# Patient Record
Sex: MALE | Race: WHITE | Employment: OTHER | URBAN - METROPOLITAN AREA
[De-identification: names, ages, dates, MRNs, and addresses within clinical notes are randomized per-mention and may not be internally consistent; named-entity substitution may affect disease eponyms.]

---

## 2017-10-03 PROBLEM — M19.90 ARTHRITIS: Status: ACTIVE | Noted: 2017-10-03

## 2017-10-03 PROBLEM — E78.5 HYPERLIPIDEMIA: Status: ACTIVE | Noted: 2017-10-03

## 2017-10-03 PROBLEM — I50.43 ACUTE ON CHRONIC COMBINED SYSTOLIC AND DIASTOLIC CONGESTIVE HEART FAILURE (HCC): Status: ACTIVE | Noted: 2017-09-23

## 2017-10-03 PROBLEM — I63.9 ISCHEMIC STROKE (HCC): Status: ACTIVE | Noted: 2017-09-24

## 2017-10-03 PROBLEM — J44.1 CHRONIC OBSTRUCTIVE PULMONARY DISEASE WITH ACUTE EXACERBATION (HCC): Status: ACTIVE | Noted: 2017-10-03

## 2017-10-03 PROBLEM — I10 HYPERTENSION: Status: ACTIVE | Noted: 2017-10-03

## 2017-10-03 PROBLEM — E11.9 DIABETES MELLITUS (HCC): Status: ACTIVE | Noted: 2017-10-03

## 2018-01-03 ENCOUNTER — HOSPITAL ENCOUNTER (OUTPATIENT)
Dept: SPEECH THERAPY | Age: 73
Discharge: HOME | End: 2018-01-03
Attending: NURSE PRACTITIONER | Admitting: NURSE PRACTITIONER

## 2018-01-03 DIAGNOSIS — R13.10 DYSPHAGIA, UNSPECIFIED TYPE: ICD-10-CM

## 2018-01-03 NOTE — COMMUNICATION BODY
INSTRUMENTAL SWALLOW REPORT  MODIFIED BARIUM SWALLOW     NAME: John Wiley                                   : 1945  MRN: [id number]                                  Date of Eval: 1/3/2018                                     Ordering Physician: Dr. Prince Dodson  Radiologist: Dr. Gloria Garcia     Referring Diagnosis(es): Referring Diagnosis: Dysphagia     Past Medical History:  has a past medical history of Arthritis; CAD (coronary artery disease); Diabetes mellitus ([address] Hyperlipidemia; and Hypertension. Past Surgical History:  has a past surgical history that includes Coronary angioplasty with stent and femoral bypass (Bilateral).     Current Diet Solid Consistency: Regular  Current Diet Liquid Consistency: Thin     Date of Prior Study: 17  Type of Study: Repeat MBS  Results of Prior Study: Recommended Puree diet with NTL     Recent CXR/CT of Chest: n/a     Patient Complaints/Reason for Referral:  John Wiley was referred for a MBS to assess the efficiency of his/her swallow function, assess for aspiration, and to make recommendations regarding safe dietary consistencies, effective compensatory strategies, and safe eating environment. Patient complaints: Pt reports occasional dysphagia      Pain   Patient Currently in Pain: No     General Comment  Comments: Pt had CVA in 2017; He was seen at [address] [place] [address] for dysphagia therapy during 2017 and was tolerating a Dysphagia II (mechanical soft) diet with thin liquids/no straws without difficulty.     Recommended Diet:  Solid consistency: Regular  Liquid consistency: Nectar (No straws)  Liquid administration via: Spoon, Cup  Medication administration: Meds in puree     Impressions:  Treatment Dx and ICD 10: Dysphagia, CVA  Pt demonstrates mild-moderate pharyngeal dysphagia  · Pt's oral phase appears grossly WFL. Pt did demonstrated reduced bolus control with all liquid trials, resulting in premature bolus loss to pharynx.   · Pt's pharyngeal cervical level throughout the duration of the study     Patient Position: Lateral and Patient Degrees: Pt seated upright in chair  Consistencies Administered: Reg solid, Puree, Thin cup, Thin teaspoon, Thin straw, Nectar cup     Dysphagia Outcome Severity Scale: Level 4: Mild moderate dysphagia- Intermittent supervision/cueing. One - two diet consistencies restricted  Penetration-Aspiration Scale (PAS): 6 - Material enters the airway, passes below the vocal folds, and is ejected into the larynx or out of the airway     Behavior/Cognition/Vision/Hearing:  Behavior/Cognition: Alert, Cooperative, Pleasant mood  Vision: Within Functional Limits  Hearing: Within functional limits     Safe Swallow Protocol:  Supervision: Distant  Compensatory Swallowing Strategies: Alternate solids and liquids, Upright as possible for all oral intake, Remain upright for 30-45 minutes after meals, No straws, Eat/Feed slowly, Small bites/sips     Recommendations/Treatment  Requires SLP Intervention: Yes  Recommendations: F/U MBS  D/C Recommendations: Home ST, Outpatient     Recommended Exercises:    Therapeutic Interventions: Diet tolerance monitoring, Patient/Family education, Laryngeal exercises, Pharyngeal exercises, Tongue base strengthening     Education: Images and recommendations were reviewed with pt following this exam.   Patient Education: Pt educated on MBSS procedure, nature of oropharyngeal deficits, assessment results and recommendations. Patient Education Response: Verbalizes understanding, Needs reinforcement     Prognosis  Prognosis for safe diet advancement: good  Safety Devices  Safety Devices in place: Yes  Type of devices:  (Pt left MBSS in no distress; left independently)     Goals: To be determined by treating SLP       G-Code:  SLP G-Codes  Functional Limitations: Swallowing  Swallow Current Status (): At least 20 percent but less than 40 percent impaired, limited or restricted  Swallow Goal Status ():  At

## 2018-01-03 NOTE — PROCEDURES
seated upright in chair  Consistencies Administered: Reg solid, Puree, Thin cup, Thin teaspoon, Thin straw, Nectar cup    Dysphagia Outcome Severity Scale: Level 4: Mild moderate dysphagia- Intermittent supervision/cueing. One - two diet consistencies restricted  Penetration-Aspiration Scale (PAS): 6 - Material enters the airway, passes below the vocal folds, and is ejected into the larynx or out of the airway    Behavior/Cognition/Vision/Hearing:  Behavior/Cognition: Alert, Cooperative, Pleasant mood  Vision: Within Functional Limits  Hearing: Within functional limits    Safe Swallow Protocol:  Supervision: Distant  Compensatory Swallowing Strategies: Alternate solids and liquids, Upright as possible for all oral intake, Remain upright for 30-45 minutes after meals, No straws, Eat/Feed slowly, Small bites/sips    Recommendations/Treatment  Requires SLP Intervention: Yes  Recommendations: F/U MBS  D/C Recommendations: Home ST, Outpatient     Recommended Exercises:    Therapeutic Interventions: Diet tolerance monitoring, Patient/Family education, Laryngeal exercises, Pharyngeal exercises, Tongue base strengthening    Education: Images and recommendations were reviewed with pt following this exam.   Patient Education: Pt educated on MBSS procedure, nature of oropharyngeal deficits, assessment results and recommendations. Patient Education Response: Verbalizes understanding, Needs reinforcement    Prognosis  Prognosis for safe diet advancement: good  Safety Devices  Safety Devices in place: Yes  Type of devices:  (Pt left MBSS in no distress; left independently)    Goals: To be determined by treating SLP      G-Code:  SLP G-Codes  Functional Limitations: Swallowing  Swallow Current Status (): At least 20 percent but less than 40 percent impaired, limited or restricted  Swallow Goal Status (): At least 20 percent but less than 40 percent impaired, limited or restricted  Swallow Discharge Status ():  At

## 2018-11-15 ENCOUNTER — HOSPITAL ENCOUNTER (OUTPATIENT)
Dept: GENERAL RADIOLOGY | Age: 73
Discharge: HOME | End: 2018-11-15
Payer: COMMERCIAL

## 2018-11-15 ENCOUNTER — HOSPITAL ENCOUNTER (OUTPATIENT)
Age: 73
Discharge: HOME | End: 2018-11-15
Payer: COMMERCIAL

## 2018-11-15 DIAGNOSIS — J44.9 OBSTRUCTIVE CHRONIC BRONCHITIS WITHOUT EXACERBATION (HCC): ICD-10-CM

## 2018-11-15 PROCEDURE — 71046 X-RAY EXAM CHEST 2 VIEWS: CPT

## 2020-09-09 ENCOUNTER — HOSPITAL ENCOUNTER (EMERGENCY)
Age: 75
Discharge: ANOTHER HOSPITAL | End: 2020-09-09
Attending: EMERGENCY MEDICINE
Payer: MEDICARE

## 2020-09-09 ENCOUNTER — APPOINTMENT (OUTPATIENT)
Dept: CT IMAGING | Age: 75
End: 2020-09-09
Payer: MEDICARE

## 2020-09-09 VITALS
DIASTOLIC BLOOD PRESSURE: 73 MMHG | TEMPERATURE: 98.5 F | OXYGEN SATURATION: 100 % | RESPIRATION RATE: 18 BRPM | HEIGHT: 72 IN | BODY MASS INDEX: 29.12 KG/M2 | HEART RATE: 67 BPM | SYSTOLIC BLOOD PRESSURE: 158 MMHG | WEIGHT: 215 LBS

## 2020-09-09 LAB
A/G RATIO: 1.3 (ref 1.1–2.2)
ALBUMIN SERPL-MCNC: 3.4 G/DL (ref 3.4–5)
ALP BLD-CCNC: 159 U/L (ref 40–129)
ALT SERPL-CCNC: 8 U/L (ref 10–40)
ANION GAP SERPL CALCULATED.3IONS-SCNC: 10 MMOL/L (ref 3–16)
AST SERPL-CCNC: 16 U/L (ref 15–37)
BASOPHILS ABSOLUTE: 0 K/UL (ref 0–0.2)
BASOPHILS RELATIVE PERCENT: 0 %
BILIRUB SERPL-MCNC: 0.6 MG/DL (ref 0–1)
BUN BLDV-MCNC: 19 MG/DL (ref 7–20)
CALCIUM SERPL-MCNC: 8.8 MG/DL (ref 8.3–10.6)
CHLORIDE BLD-SCNC: 104 MMOL/L (ref 99–110)
CO2: 28 MMOL/L (ref 21–32)
CREAT SERPL-MCNC: 1.4 MG/DL (ref 0.8–1.3)
EOSINOPHILS ABSOLUTE: 0.5 K/UL (ref 0–0.6)
EOSINOPHILS RELATIVE PERCENT: 6 %
GFR AFRICAN AMERICAN: 59
GFR NON-AFRICAN AMERICAN: 49
GLOBULIN: 2.7 G/DL
GLUCOSE BLD-MCNC: 147 MG/DL (ref 70–99)
GLUCOSE BLD-MCNC: 148 MG/DL (ref 70–99)
HCT VFR BLD CALC: 41.5 % (ref 40.5–52.5)
HEMOGLOBIN: 14 G/DL (ref 13.5–17.5)
LYMPHOCYTES ABSOLUTE: 1.1 K/UL (ref 1–5.1)
LYMPHOCYTES RELATIVE PERCENT: 12 %
MAGNESIUM: 1.5 MG/DL (ref 1.8–2.4)
MCH RBC QN AUTO: 30 PG (ref 26–34)
MCHC RBC AUTO-ENTMCNC: 33.8 G/DL (ref 31–36)
MCV RBC AUTO: 88.6 FL (ref 80–100)
MONOCYTES ABSOLUTE: 0.8 K/UL (ref 0–1.3)
MONOCYTES RELATIVE PERCENT: 9 %
NEUTROPHILS ABSOLUTE: 6.4 K/UL (ref 1.7–7.7)
NEUTROPHILS RELATIVE PERCENT: 73 %
PDW BLD-RTO: 14.5 % (ref 12.4–15.4)
PERFORMED ON: ABNORMAL
PLATELET # BLD: 170 K/UL (ref 135–450)
PLATELET SLIDE REVIEW: ADEQUATE
PMV BLD AUTO: 9.7 FL (ref 5–10.5)
POTASSIUM REFLEX MAGNESIUM: 3.2 MMOL/L (ref 3.5–5.1)
RBC # BLD: 4.69 M/UL (ref 4.2–5.9)
RBC # BLD: NORMAL 10*6/UL
SLIDE REVIEW: NORMAL
SODIUM BLD-SCNC: 142 MMOL/L (ref 136–145)
TOTAL PROTEIN: 6.1 G/DL (ref 6.4–8.2)
WBC # BLD: 8.8 K/UL (ref 4–11)

## 2020-09-09 PROCEDURE — 96365 THER/PROPH/DIAG IV INF INIT: CPT

## 2020-09-09 PROCEDURE — 72125 CT NECK SPINE W/O DYE: CPT

## 2020-09-09 PROCEDURE — 2580000003 HC RX 258: Performed by: NURSE PRACTITIONER

## 2020-09-09 PROCEDURE — 6360000002 HC RX W HCPCS: Performed by: NURSE PRACTITIONER

## 2020-09-09 PROCEDURE — 6370000000 HC RX 637 (ALT 250 FOR IP): Performed by: NURSE PRACTITIONER

## 2020-09-09 PROCEDURE — 83735 ASSAY OF MAGNESIUM: CPT

## 2020-09-09 PROCEDURE — 85025 COMPLETE CBC W/AUTO DIFF WBC: CPT

## 2020-09-09 PROCEDURE — 99285 EMERGENCY DEPT VISIT HI MDM: CPT

## 2020-09-09 PROCEDURE — 70450 CT HEAD/BRAIN W/O DYE: CPT

## 2020-09-09 PROCEDURE — 80053 COMPREHEN METABOLIC PANEL: CPT

## 2020-09-09 RX ORDER — 0.9 % SODIUM CHLORIDE 0.9 %
1000 INTRAVENOUS SOLUTION INTRAVENOUS ONCE
Status: COMPLETED | OUTPATIENT
Start: 2020-09-09 | End: 2020-09-09

## 2020-09-09 RX ORDER — MAGNESIUM SULFATE 1 G/100ML
1 INJECTION INTRAVENOUS ONCE
Status: COMPLETED | OUTPATIENT
Start: 2020-09-09 | End: 2020-09-09

## 2020-09-09 RX ORDER — POTASSIUM CHLORIDE 20 MEQ/1
40 TABLET, EXTENDED RELEASE ORAL ONCE
Status: COMPLETED | OUTPATIENT
Start: 2020-09-09 | End: 2020-09-09

## 2020-09-09 RX ADMIN — POTASSIUM CHLORIDE 40 MEQ: 1500 TABLET, EXTENDED RELEASE ORAL at 20:51

## 2020-09-09 RX ADMIN — SODIUM CHLORIDE 1000 ML: 9 INJECTION, SOLUTION INTRAVENOUS at 20:51

## 2020-09-09 RX ADMIN — MAGNESIUM SULFATE HEPTAHYDRATE 1 G: 1 INJECTION, SOLUTION INTRAVENOUS at 20:51

## 2020-09-09 ASSESSMENT — ENCOUNTER SYMPTOMS
COLOR CHANGE: 0
SORE THROAT: 0
ABDOMINAL PAIN: 0
RHINORRHEA: 0
SHORTNESS OF BREATH: 0

## 2020-09-10 NOTE — ED NOTES
Saline lock removed intact. IV site looked unremarkable. Pressure dressing applied. Discharge instructions reviewed with Mr. Casa Hale. He verbalized understanding. Copy of discharge instructions and prescriptions given. Mr. Casa Hale was discharged to home in good condition per personal vehicle, friend/family driving. He exited the ED without difficulty.         Tiffanie Dorman RN  09/09/20 0310

## 2020-09-10 NOTE — ED PROVIDER NOTES
Diagnosis Date    Arthritis     CAD (coronary artery disease)     [facility]) [facility]     Diabetes mellitus ([facility]     Hyperlipidemia     Hypertension          SURGICAL HISTORY       Past Surgical History:   Procedure Laterality Date    CORONARY ANGIOPLASTY WITH STENT PLACEMENT      FEMORAL BYPASS Bilateral          CURRENT MEDICATIONS       Discharge Medication List as of 9/9/2020 11:23 PM      CONTINUE these medications which have NOT CHANGED    Details   albuterol sulfate HFA (PROAIR HFA) 108 (90 Base) MCG/ACT inhaler Inhale 2 puffs into the lungs every 6 hours as needed for Wheezing, Disp-1 Inhaler, R-3Print      acetaminophen (TYLENOL) 325 MG tablet Take 2 tablets by mouth every 4 hours as needed for Pain or Fever Maximum dose of acetaminophen is 4000 mg from all sources in 24 hours. , Disp-120 tablet, R-3OTC      ipratropium-albuterol (DUONEB) 0.5-2.5 (3) MG/3ML SOLN nebulizer solution Inhale 3 mLs into the lungs 3 times daily, Disp-360 mL, R-0Print      mometasone-formoterol (DULERA) 200-5 MCG/ACT inhaler Inhale 2 puffs into the lungs 2 times daily Substituted for Budesonide-Formoterol (SYMBICORT). , Disp-1 Inhaler, R-0Print      atorvastatin (LIPITOR) 80 MG tablet Take 1 tablet by mouth daily, Disp-30 tablet, R-0Print      amLODIPine (NORVASC) 10 MG tablet Take 0.5 tablets by mouth nightly Note: new lower dose, Disp-30 tablet, R-3Print      furosemide (LASIX) 40 MG tablet Take 1 tablet by mouth daily, Disp-60 tablet, R-0Normal      trospium (SANCTURA) 60 MG CP24 extended release capsule Take 1 capsule by mouth daily, Disp-30 capsule, R-0Print      insulin glargine (LANTUS SOLOSTAR) 100 UNIT/ML injection pen Inject 20 Units into the skin nightly, Disp-5 Pen, R-3Print      insulin lispro (HUMALOG KWIKPEN) 100 UNIT/ML pen Inject 0-6 Units into the skin 3 times daily (before meals) Low Dose Correction Algorithm: BS  No Insulin, -199 1 Unit, 200-249 2 Units, 250-299 3 Units, 300-349 4 Units, 350-399 5 Units, 400 and above give 6 Units, Disp-5 Pen, R-3Print      carvedilol (COREG) 6.25 MG tablet Take 6.25 mg by mouth 2 times dailyHistorical Med      aspirin 81 MG chewable tablet Take 81 mg by mouth daily      lisinopril (PRINIVIL;ZESTRIL) 40 MG tablet Take 40 mg by mouth daily      pantoprazole sodium (PROTONIX) 40 MG PACK packet Take 40 mg by mouth every morning (before breakfast)      SITagliptin-MetFORMIN HCl ER (JANUMET XR) 100-1000 MG TB24 Take 1 tablet by mouth daily      clopidogrel (PLAVIX) 75 MG tablet Take 75 mg by mouth daily               ALLERGIES     No known allergies    FAMILY HISTORY     History reviewed. No pertinent family history.       SOCIAL HISTORY       Social History     Socioeconomic History    Marital status:      Spouse name: None    Number of children: None    Years of education: None    Highest education level: None   Occupational History    None   Social Needs    Financial resource strain: None    Food insecurity     Worry: None     Inability: None    Transportation needs     Medical: None     Non-medical: None   Tobacco Use    Smoking status: Former Smoker     Packs/day: 1.50     Types: Cigarettes     Last attempt to quit: 7/23/2017     Years since quitting: 3.1    Smokeless tobacco: Never Used   Substance and Sexual Activity    Alcohol use: No    Drug use: No    Sexual activity: Never   Lifestyle    Physical activity     Days per week: None     Minutes per session: None    Stress: None   Relationships    Social connections     Talks on phone: None     Gets together: None     Attends Buddhism service: None     Active member of club or organization: None     Attends meetings of clubs or organizations: None     Relationship status: None    Intimate partner violence     Fear of current or ex partner: None     Emotionally abused: None     Physically abused: None     Forced sexual activity: None   Other Topics Concern  None   Social History Narrative    None       SCREENINGS             PHYSICAL EXAM    (up to 7 for level 4, 8 ormore for level 5)     ED Triage Vitals [09/09/20 1826]   BP Temp Temp Source Pulse Resp SpO2 Height Weight   (!) 143/77 98.5 °F (36.9 °C) Temporal 77 18 98 % 6' (1.829 m) 215 lb (97.5 kg)       Physical Exam  Constitutional:       Appearance: He is well-developed. Comments: Elderly male   HENT:      Head: Normocephalic and atraumatic. Eyes:      Extraocular Movements: Extraocular movements intact. Pupils: Pupils are equal, round, and reactive to light. Neck:      Musculoskeletal: Normal range of motion. Cardiovascular:      Rate and Rhythm: Normal rate. Pulmonary:      Effort: Pulmonary effort is normal. No respiratory distress. Abdominal:      General: There is no distension. Palpations: Abdomen is soft. Tenderness: There is no abdominal tenderness. Musculoskeletal: Normal range of motion. Skin:     General: Skin is warm and dry. Neurological:      General: No focal deficit present. Mental Status: He is alert and oriented to person, place, and time.          DIAGNOSTIC RESULTS   LABS:    Labs Reviewed   COMPREHENSIVE METABOLIC PANEL W/ REFLEX TO MG FOR LOW K - Abnormal; Notable for the following components:       Result Value    Potassium reflex Magnesium 3.2 (*)     Glucose 148 (*)     CREATININE 1.4 (*)     GFR Non- 49 (*)     GFR  59 (*)     Total Protein 6.1 (*)     Alkaline Phosphatase 159 (*)     ALT 8 (*)     All other components within normal limits    Narrative:     Performed at:  [facility] ([facility] - [facility],  [place], [facility]   Phone [phone]   MAGNESIUM - Abnormal; Notable for the following components:    Magnesium 1.50 (*)     All other components within normal limits    Narrative:     Performed at:  [facility] ([facility]) - [facility],  [place] [address]   Phone [phone]   POCT GLUCOSE - Abnormal; Notable for the following components:    POC Glucose 147 (*)     All other components within normal limits    Narrative:     Performed at:  [facility],  [place], [facility]   Phone [phone]   CBC WITH AUTO DIFFERENTIAL    Narrative:     Performed at:  [facility] ([facility]) - [facility],  [place], [facility]   Phone [phone]   URINE RT REFLEX TO CULTURE       All other labs were within normal range or not returned as of this dictation. EKG: All EKG's are interpreted by the Emergency Department Physician who either signs or Co-signs this chart in the absence of a cardiologist.  Please see their note for interpretation of EKG. RADIOLOGY:     Head CT interpreted by radiologist for    FINDINGS:   Head CT:     BRAIN/VENTRICLES: There is no acute intracranial hemorrhage, mass effect or   midline shift.  No abnormal extra-axial fluid collection.  The gray-white   differentiation is maintained without evidence of an acute infarct.  There is   no evidence of hydrocephalus. Again identified is central atrophy and chronic   white matter disease.  Remote lacunar right thalamic infarct is noted. ORBITS: The visualized portion of the orbits demonstrate no acute abnormality. SINUSES: There is mild opacification of the ethmoid sinuses including mucosal   thickening.  The remaining visualized paranasal sinuses and mastoid air cells   are clear. SOFT TISSUES/SKULL:  No acute abnormality of the visualized skull or soft   tissues. Cervical spine CT:     Bones/alignment: There is minimal retrolisthesis at C3-4 and minimal   anterolisthesis at C7-T1, likely degenerative.  There is minimal loss of   height of the T2 vertebral body with depression of its superior endplate.  No   retropulsion of the posterior cortex or involvement of posterior elements.      Degenerative changes: Multilevel degenerative disc disease is identified,   most pronounced at C3-4 through C5-6, with disc space narrowing, endplate   sclerosis and spurring.  Facet hypertrophy changes are present, greatest on   the right at C5-6.  A small central disc protrusion is suspected at C4-5. No critical bony central canal narrowing is suspected.  Neural foraminal   narrowing is noted at multiple levels, greatest on the right at C5-6. Soft tissues: No prevertebral soft tissue swelling.  Calcifications of the   carotid bifurcations are present. The thyroid gland is enlarged and heterogeneous. Interpretation per the Radiologist below, if available at the time of this note:    CT CERVICAL SPINE WO CONTRAST   Final Result   Addendum 1 of 1   ADDENDUM:   The impression should include: Minimal loss of height of the T2 vertebral   bodies suspicious for compression fracture of indeterminate age. Correlation   is recommended. The findings were sent to the Radiology Results [address] at    8:50   pm on 9/9/2020to be communicated to a licensed caregiver. Final      CT HEAD WO CONTRAST   Final Result   Addendum 1 of 1   ADDENDUM:   The impression should include: Minimal loss of height of the T2 vertebral   bodies suspicious for compression fracture of indeterminate age. Correlation   is recommended. The findings were sent to the Radiology Results [address] at    8:50   pm on 9/9/2020to be communicated to a licensed caregiver. Final        Cervical spine CT interpreted by radiologist for    Addendum 1 of 1 by Tiana Maldonado MD (09/09/20 20:50:46)     ADDENDUM:  The impression should include: Minimal loss of height of the T2 vertebral  bodies suspicious for compression fracture of indeterminate age.   Correlation  is recommended.     The findings were sent to the Radiology Results Communication Center at 8:50  pm on 9/9/2020to be communicated to a licensed bolus (0 mLs Intravenous Stopped 9/9/20 1339)         Patient was seen and evaluated by myself and Dr Gayle Dave. Patient here for concerns for low blood sugar. Patient states that he stood up earlier this evening and lost his balance and fell. Patient states his blood sugar was checked and it was 79. He has eaten to peanut butter sandwiches and by the time EMS arrived his blood sugar had improved into the 120s. On exam the patient is awake and alert hemodynamically stable non toxic in appearance. Lab values have been reviewed and interpreted. Patient was found to be low on potassium and magnesium he was supplemented. He also had signs of dehydration with a BUN of 19 and a GFR of 49. He was given IV fluids. Cervical spine and head CT were interpreted by radiologist and were concerning for possible age-indeterminate compression fracture however the patient denies any current pain. Dr. Turner Outhouse to follow-up on reevaluation after IV fluids and electrolytes and final disposition of the patient. The patient tolerated their visit well. They were seen and evaluated by the attending physician, Derick Bowens DO who agreed with the assessment and plan. The patient and / or the family were informed of the results of any tests, a time was given to answer questions, a plan was proposed and they agreed with plan. FINAL IMPRESSION      1. Dizziness    2. Fall, initial encounter    3. KINSEY (acute kidney injury) ([facility]    4. Hypokalemia    5.  Hypomagnesemia          DISPOSITION/PLAN   DISPOSITION        PATIENT REFERRED TO:  Joo Grant  [address] [facility]  [phone]    Call in 1 day        DISCHARGE MEDICATIONS:  Discharge Medication List as of 9/9/2020 11:23 PM          DISCONTINUED MEDICATIONS:  Discharge Medication List as of 9/9/2020 11:23 PM                 (Please note that portions of this note were completed with a voice recognition program.  Efforts were made to edit the dictations but occasionally words are mis-transcribed.)    JEN Greenwood CNP (electronically signed)       JEN Greenwood CNP  09/10/20 2019

## 2020-09-11 NOTE — ED PROVIDER NOTES
I independently interviewed, examined and evaluated Miryam Gonzalez. In brief, this is a 78-year-old male with a past medical history of COPD, hypertension, hyperlipidemia, CHF, and diabetes presenting for evaluation after a fall. The patient states that his legs gave out on him, and he laid down on the floor. He denies hitting his head or losing consciousness. His neighbor is here who did not witness the fall, but he yelled out for her and she came shortly after. On further questioning, the patient states that he felt dizzy for several minutes, and was acting confused per his neighbor. He apparently had a sensation of vertigo and was unable to walk. His leg simply did not give out on him after further questioning. He states that he took his blood sugar at the time, it was 72 and he thought this was low so he called EMS. He came here to be evaluated. He denies any symptoms of dizziness, headache, numbness, or weakness on my examination. On exam he does have dry mucous membranes. Lungs are diffusely diminished, no rales or rhonchi. Strength 5/5 in UE and LE bilaterally. Sensation intact x 4. No aphasia or dysarthria. CN 2-12 intact. No facial droop. No limb or gait ataxia.      González Phelps NIH Stroke Scale is:  Level of Consciousness:  0 - alert; keenly responsive    LOC Questions:  0 - answers both questions correctly    LOC Commands:  0 - performs both tasks correctly    Best Gaze:  0 - normal    Visual Fields:  0 - no visual loss    Facial Palsy:  0 - normal symmetric movement    Motor-Arm-Left:  0 - no drift, limb holds 90 (or 45) degrees for full 10 seconds    Motor-Leg-Left:  0 - no drift; leg holds 30 degree position for full 5 seconds    Motor-Arm-Right:  0 - no drift, limb holds 90 (or 45) degrees for full 10 seconds    Motor-Leg-Right:  0 - no drift; leg holds 30 degree position for full 5 seconds    Limb Ataxia:  0 - absent    Sensory:  0 - normal; no sensory loss    Best Language:  0 - no aphasia, normal    Dysarthria:  0 - normal    Extinction and Inattention:  0 - no abnormality    Total 0    ED course: The patient is a 68-year-old male presenting for evaluation after he thought he was hypoglycemic. Blood sugar was 72 at home. On arrival here it is within normal limits. Overall he is nontoxic-appearing on exam.  He has normal vital signs throughout his emergency department stay. I did speak with the patient and his neighbor at length regarding what happened tonight, I am concerned he may have had a TIA due to his dizziness and transient confusion. He did not have syncope or simple mechanical fall. He was also found to have an KINSEY with creatinine of 1.4, he is hypomagnesemic, and hypokalemic. His electrolytes were replaced and he was given fluids. Due to this and the fact that he potentially had a posterior TIA tonight, I did suggest he had endoscopy evaluation results and trend his creatinine. However he states he wants to go home. He is aware of the risks of going home. He exhibits capacity. We did encourage the patient to follow-up closely with his primary care physician. He is also told to return at anytime if he has further concerns. I discussed the nature and purpose, risks and benefits, as well as, the alternatives of KINSEY, posterior TIA, electrolyte derangements with Dangelo Dacosta. Dangelo Dacosta was given the time and opportunity to ask questions and consider their options, and after the discussion, Dangelo Dacosta decided to refuse. I informed Dangelo Dacosta that refusal could lead to, but was not limited to, death, permanent disability, or severe pain. If present, I asked the relatives or significant others of Dangelo Dacosta to dissuade them without success. Prior to refusing, their nurse and I determined and agreed that Dangelo Dacosta had the capacity to make this decision and understood the consequences of that decision.  Dangelo Dacosta signed the refusal of treatment form and their nurse signed the form agreeing that the patient/guardian had received informed consent. After refusal, I made every reasonable opportunity to treat Ashish Asha to the best of my ability. All diagnostic, treatment, and disposition decisions were made by myself in conjunction with the advanced practice provider. For all further details of the patient's emergency department visit, please see the advanced practice provider's documentation. Comment: Please note this report has been produced using speech recognition software and may contain errors related to that system including errors in grammar, punctuation, and spelling, as well as words and phrases that may be inappropriate. If there are any questions or concerns please feel free to contact the dictating provider for clarification.          Juanito [place]  09/10/20 1090

## 2021-01-01 ENCOUNTER — APPOINTMENT (OUTPATIENT)
Dept: GENERAL RADIOLOGY | Age: 76
DRG: 481 | End: 2021-01-01
Payer: MEDICARE

## 2021-01-01 ENCOUNTER — HOSPITAL ENCOUNTER (EMERGENCY)
Age: 76
Discharge: HOME | End: 2021-08-14
Payer: MEDICARE

## 2021-01-01 ENCOUNTER — APPOINTMENT (OUTPATIENT)
Dept: GENERAL RADIOLOGY | Age: 76
DRG: 215 | End: 2021-01-01
Payer: MEDICARE

## 2021-01-01 ENCOUNTER — HOSPITAL ENCOUNTER (INPATIENT)
Age: 76
LOS: 3 days | DRG: 215 | End: 2021-09-14
Attending: EMERGENCY MEDICINE | Admitting: INTERNAL MEDICINE
Payer: MEDICARE

## 2021-01-01 ENCOUNTER — HOSPITAL ENCOUNTER (OUTPATIENT)
Dept: PET IMAGING | Age: 76
Discharge: HOME | End: 2021-06-22
Payer: MEDICARE

## 2021-01-01 ENCOUNTER — APPOINTMENT (OUTPATIENT)
Dept: CT IMAGING | Age: 76
End: 2021-01-01
Payer: MEDICARE

## 2021-01-01 ENCOUNTER — ANESTHESIA EVENT (OUTPATIENT)
Dept: OPERATING ROOM | Age: 76
DRG: 481 | End: 2021-01-01
Payer: MEDICARE

## 2021-01-01 ENCOUNTER — OFFICE VISIT (OUTPATIENT)
Dept: ORTHOPEDIC SURGERY | Age: 76
End: 2021-01-01

## 2021-01-01 ENCOUNTER — APPOINTMENT (OUTPATIENT)
Dept: CT IMAGING | Age: 76
DRG: 370 | End: 2021-01-01
Payer: MEDICARE

## 2021-01-01 ENCOUNTER — TELEPHONE (OUTPATIENT)
Dept: ORTHOPEDIC SURGERY | Age: 76
End: 2021-01-01

## 2021-01-01 ENCOUNTER — TELEPHONE (OUTPATIENT)
Dept: PULMONOLOGY | Age: 76
End: 2021-01-01

## 2021-01-01 ENCOUNTER — HOSPITAL ENCOUNTER (OUTPATIENT)
Age: 76
Discharge: HOME | End: 2021-06-14
Payer: MEDICARE

## 2021-01-01 ENCOUNTER — VIRTUAL VISIT (OUTPATIENT)
Dept: PULMONOLOGY | Age: 76
End: 2021-01-01
Payer: MEDICARE

## 2021-01-01 ENCOUNTER — APPOINTMENT (OUTPATIENT)
Dept: GENERAL RADIOLOGY | Age: 76
End: 2021-01-01
Payer: MEDICARE

## 2021-01-01 ENCOUNTER — ANESTHESIA EVENT (OUTPATIENT)
Dept: ENDOSCOPY | Age: 76
DRG: 370 | End: 2021-01-01
Payer: MEDICARE

## 2021-01-01 ENCOUNTER — APPOINTMENT (OUTPATIENT)
Dept: GENERAL RADIOLOGY | Age: 76
DRG: 370 | End: 2021-01-01
Payer: MEDICARE

## 2021-01-01 ENCOUNTER — HOSPITAL ENCOUNTER (INPATIENT)
Age: 76
LOS: 5 days | Discharge: NURSING, REHAB OR LONG-TERM CARE | DRG: 481 | End: 2021-03-09
Attending: STUDENT IN AN ORGANIZED HEALTH CARE EDUCATION/TRAINING PROGRAM | Admitting: INTERNAL MEDICINE
Payer: MEDICARE

## 2021-01-01 ENCOUNTER — HOSPITAL ENCOUNTER (INPATIENT)
Age: 76
LOS: 1 days | Discharge: HOME | DRG: 370 | End: 2021-02-20
Attending: EMERGENCY MEDICINE | Admitting: INTERNAL MEDICINE
Payer: MEDICARE

## 2021-01-01 ENCOUNTER — HOSPITAL ENCOUNTER (EMERGENCY)
Age: 76
Discharge: LEFT BEFORE DISCHARGE | End: 2021-03-03
Attending: EMERGENCY MEDICINE
Payer: MEDICARE

## 2021-01-01 ENCOUNTER — HOSPITAL ENCOUNTER (OUTPATIENT)
Dept: PULMONOLOGY | Age: 76
Discharge: HOME | End: 2021-06-16
Payer: MEDICARE

## 2021-01-01 ENCOUNTER — ANESTHESIA (OUTPATIENT)
Dept: OPERATING ROOM | Age: 76
DRG: 481 | End: 2021-01-01
Payer: MEDICARE

## 2021-01-01 ENCOUNTER — ANESTHESIA (OUTPATIENT)
Dept: ENDOSCOPY | Age: 76
DRG: 370 | End: 2021-01-01
Payer: MEDICARE

## 2021-01-01 VITALS
BODY MASS INDEX: 22.62 KG/M2 | HEIGHT: 72 IN | SYSTOLIC BLOOD PRESSURE: 121 MMHG | OXYGEN SATURATION: 94 % | DIASTOLIC BLOOD PRESSURE: 69 MMHG | HEART RATE: 64 BPM | WEIGHT: 167 LBS | TEMPERATURE: 96.9 F | RESPIRATION RATE: 16 BRPM

## 2021-01-01 VITALS
SYSTOLIC BLOOD PRESSURE: 155 MMHG | HEIGHT: 72 IN | TEMPERATURE: 97.9 F | WEIGHT: 156.9 LBS | HEART RATE: 67 BPM | OXYGEN SATURATION: 96 % | BODY MASS INDEX: 21.25 KG/M2 | RESPIRATION RATE: 17 BRPM | DIASTOLIC BLOOD PRESSURE: 81 MMHG

## 2021-01-01 VITALS — SYSTOLIC BLOOD PRESSURE: 115 MMHG | DIASTOLIC BLOOD PRESSURE: 94 MMHG | OXYGEN SATURATION: 98 %

## 2021-01-01 VITALS — WEIGHT: 167 LBS | HEIGHT: 70 IN | BODY MASS INDEX: 23.91 KG/M2

## 2021-01-01 VITALS
RESPIRATION RATE: 18 BRPM | WEIGHT: 156 LBS | OXYGEN SATURATION: 98 % | DIASTOLIC BLOOD PRESSURE: 76 MMHG | SYSTOLIC BLOOD PRESSURE: 161 MMHG | HEIGHT: 72 IN | BODY MASS INDEX: 21.13 KG/M2 | TEMPERATURE: 97.9 F | HEART RATE: 77 BPM

## 2021-01-01 VITALS
DIASTOLIC BLOOD PRESSURE: 91 MMHG | WEIGHT: 167 LBS | TEMPERATURE: 97.8 F | OXYGEN SATURATION: 99 % | BODY MASS INDEX: 23.96 KG/M2 | SYSTOLIC BLOOD PRESSURE: 192 MMHG | HEART RATE: 78 BPM | RESPIRATION RATE: 20 BRPM

## 2021-01-01 VITALS
TEMPERATURE: 97.5 F | SYSTOLIC BLOOD PRESSURE: 113 MMHG | BODY MASS INDEX: 26.21 KG/M2 | HEIGHT: 67 IN | OXYGEN SATURATION: 41 % | WEIGHT: 167 LBS | DIASTOLIC BLOOD PRESSURE: 61 MMHG

## 2021-01-01 VITALS — SYSTOLIC BLOOD PRESSURE: 121 MMHG | OXYGEN SATURATION: 100 % | DIASTOLIC BLOOD PRESSURE: 69 MMHG

## 2021-01-01 VITALS — WEIGHT: 167 LBS | RESPIRATION RATE: 12 BRPM | TEMPERATURE: 97 F | BODY MASS INDEX: 23.91 KG/M2 | HEIGHT: 70 IN

## 2021-01-01 DIAGNOSIS — R91.1 PULMONARY NODULE: Primary | ICD-10-CM

## 2021-01-01 DIAGNOSIS — S72.002D CLOSED FRACTURE OF LEFT HIP WITH ROUTINE HEALING, SUBSEQUENT ENCOUNTER: ICD-10-CM

## 2021-01-01 DIAGNOSIS — R91.1 PULMONARY NODULE: ICD-10-CM

## 2021-01-01 DIAGNOSIS — M25.552 HIP PAIN, LEFT: Primary | ICD-10-CM

## 2021-01-01 DIAGNOSIS — I48.91 ATRIAL FIBRILLATION WITH RVR (HCC): ICD-10-CM

## 2021-01-01 DIAGNOSIS — J43.2 CENTRILOBULAR EMPHYSEMA (HCC): ICD-10-CM

## 2021-01-01 DIAGNOSIS — R91.1 RIGHT UPPER LOBE PULMONARY NODULE: ICD-10-CM

## 2021-01-01 DIAGNOSIS — Z72.0 TOBACCO ABUSE: ICD-10-CM

## 2021-01-01 DIAGNOSIS — R94.8 ABNORMAL POSITRON EMISSION TOMOGRAPHY (PET) SCAN: Primary | ICD-10-CM

## 2021-01-01 DIAGNOSIS — R82.71 BACTERIURIA: ICD-10-CM

## 2021-01-01 DIAGNOSIS — K92.0 GASTROINTESTINAL HEMORRHAGE WITH HEMATEMESIS: Primary | ICD-10-CM

## 2021-01-01 DIAGNOSIS — K92.1 MELENA: ICD-10-CM

## 2021-01-01 DIAGNOSIS — S72.145A CLOSED NONDISPLACED INTERTROCHANTERIC FRACTURE OF LEFT FEMUR, INITIAL ENCOUNTER (HCC): Primary | ICD-10-CM
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
## 2021-01-01 DIAGNOSIS — Z01.818 PREOP EXAMINATION: ICD-10-CM
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
## 2021-01-01 DIAGNOSIS — N28.89 NODULE OF KIDNEY: ICD-10-CM

## 2021-01-01 DIAGNOSIS — F17.200 TOBACCO DEPENDENCE: ICD-10-CM

## 2021-01-01 DIAGNOSIS — R07.9 CHEST PAIN, UNSPECIFIED TYPE: Primary | ICD-10-CM

## 2021-01-01 DIAGNOSIS — S39.012A STRAIN OF LUMBAR REGION, INITIAL ENCOUNTER: ICD-10-CM

## 2021-01-01 DIAGNOSIS — R91.1 RIGHT UPPER LOBE PULMONARY NODULE: Primary | ICD-10-CM

## 2021-01-01 DIAGNOSIS — E87.20 LACTIC ACIDOSIS: ICD-10-CM

## 2021-01-01 LAB
A/G RATIO: 0.9 (ref 1.1–2.2)
A/G RATIO: 1.1 (ref 1.1–2.2)
A/G RATIO: 1.1 (ref 1.1–2.2)
A/G RATIO: 1.2 (ref 1.1–2.2)
A/G RATIO: 1.2 (ref 1.1–2.2)
ABO/RH: NORMAL
ALBUMIN SERPL-MCNC: 2.6 G/DL (ref 3.4–5)
ALBUMIN SERPL-MCNC: 2.9 G/DL (ref 3.4–5)
ALBUMIN SERPL-MCNC: 3 G/DL (ref 3.4–5)
ALBUMIN SERPL-MCNC: 3.4 G/DL (ref 3.4–5)
ALBUMIN SERPL-MCNC: 3.5 G/DL (ref 3.4–5)
ALBUMIN SERPL-MCNC: 3.5 G/DL (ref 3.4–5)
ALBUMIN SERPL-MCNC: 3.7 G/DL (ref 3.4–5)
ALP BLD-CCNC: 128 U/L (ref 40–129)
ALP BLD-CCNC: 144 U/L (ref 40–129)
ALP BLD-CCNC: 145 U/L (ref 40–129)
ALP BLD-CCNC: 168 U/L (ref 40–129)
ALP BLD-CCNC: 169 U/L (ref 40–129)
ALP BLD-CCNC: 174 U/L (ref 40–129)
ALP BLD-CCNC: 187 U/L (ref 40–129)
ALT SERPL-CCNC: 10 U/L (ref 10–40)
ALT SERPL-CCNC: 12 U/L (ref 10–40)
ALT SERPL-CCNC: 13 U/L (ref 10–40)
ALT SERPL-CCNC: 7 U/L (ref 10–40)
ALT SERPL-CCNC: 7 U/L (ref 10–40)
ALT SERPL-CCNC: 8 U/L (ref 10–40)
ALT SERPL-CCNC: 8 U/L (ref 10–40)
ANION GAP SERPL CALCULATED.3IONS-SCNC: 11 MMOL/L (ref 3–16)
ANION GAP SERPL CALCULATED.3IONS-SCNC: 5 MMOL/L (ref 3–16)
ANION GAP SERPL CALCULATED.3IONS-SCNC: 7 MMOL/L (ref 3–16)
ANION GAP SERPL CALCULATED.3IONS-SCNC: 7 MMOL/L (ref 3–16)
ANION GAP SERPL CALCULATED.3IONS-SCNC: 9 MMOL/L (ref 3–16)
ANTIBODY SCREEN: NORMAL
APTT: 34 SEC (ref 26.2–38.6)
APTT: 34.3 SEC (ref 26.2–38.6)
APTT: 54.4 SEC (ref 26.2–38.6)
APTT: 55.6 SEC (ref 26.2–38.6)
APTT: 56.3 SEC (ref 26.2–38.6)
APTT: 61.1 SEC (ref 26.2–38.6)
APTT: 62.6 SEC (ref 26.2–38.6)
APTT: 64.6 SEC (ref 26.2–38.6)
APTT: 65 SEC (ref 26.2–38.6)
APTT: 82.3 SEC (ref 26.2–38.6)
APTT: 91.7 SEC (ref 26.2–38.6)
APTT: >248 SEC (ref 26.2–38.6)
AST SERPL-CCNC: 11 U/L (ref 15–37)
AST SERPL-CCNC: 13 U/L (ref 15–37)
AST SERPL-CCNC: 14 U/L (ref 15–37)
AST SERPL-CCNC: 24 U/L (ref 15–37)
AST SERPL-CCNC: 49 U/L (ref 15–37)
BACTERIA: ABNORMAL /HPF
BASE EXCESS ARTERIAL: 3 (ref -3–3)
BASE EXCESS VENOUS: 6.2 MMOL/L (ref -3–3)
BASOPHILS ABSOLUTE: 0 K/UL (ref 0–0.2)
BASOPHILS ABSOLUTE: 0.1 K/UL (ref 0–0.2)
BASOPHILS RELATIVE PERCENT: 0.3 %
BASOPHILS RELATIVE PERCENT: 0.6 %
BASOPHILS RELATIVE PERCENT: 0.8 %
BASOPHILS RELATIVE PERCENT: 1 %
BASOPHILS RELATIVE PERCENT: 1 %
BILIRUB SERPL-MCNC: 0.4 MG/DL (ref 0–1)
BILIRUB SERPL-MCNC: 0.4 MG/DL (ref 0–1)
BILIRUB SERPL-MCNC: 0.5 MG/DL (ref 0–1)
BILIRUB SERPL-MCNC: 0.6 MG/DL (ref 0–1)
BILIRUB SERPL-MCNC: 0.8 MG/DL (ref 0–1)
BILIRUB SERPL-MCNC: 1.9 MG/DL (ref 0–1)
BILIRUB SERPL-MCNC: 2 MG/DL (ref 0–1)
BILIRUBIN DIRECT: 0.4 MG/DL (ref 0–0.3)
BILIRUBIN DIRECT: 0.4 MG/DL (ref 0–0.3)
BILIRUBIN URINE: NEGATIVE
BILIRUBIN URINE: NEGATIVE
BILIRUBIN, INDIRECT: 1.5 MG/DL (ref 0–1)
BILIRUBIN, INDIRECT: 1.6 MG/DL (ref 0–1)
BLOOD CULTURE, ROUTINE: NORMAL
BLOOD, URINE: ABNORMAL
BLOOD, URINE: NEGATIVE
BUN BLDV-MCNC: 15 MG/DL (ref 7–20)
BUN BLDV-MCNC: 17 MG/DL (ref 7–20)
BUN BLDV-MCNC: 17 MG/DL (ref 7–20)
BUN BLDV-MCNC: 20 MG/DL (ref 7–20)
BUN BLDV-MCNC: 21 MG/DL (ref 7–20)
BUN BLDV-MCNC: 23 MG/DL (ref 7–20)
BUN BLDV-MCNC: 23 MG/DL (ref 7–20)
BUN BLDV-MCNC: 24 MG/DL (ref 7–20)
BUN BLDV-MCNC: 26 MG/DL (ref 7–20)
BUN BLDV-MCNC: 29 MG/DL (ref 7–20)
C DIFF TOXIN/ANTIGEN: NORMAL
CALCIUM IONIZED: 1.07 MMOL/L (ref 1.12–1.32)
CALCIUM SERPL-MCNC: 8.2 MG/DL (ref 8.3–10.6)
CALCIUM SERPL-MCNC: 8.3 MG/DL (ref 8.3–10.6)
CALCIUM SERPL-MCNC: 8.4 MG/DL (ref 8.3–10.6)
CALCIUM SERPL-MCNC: 8.6 MG/DL (ref 8.3–10.6)
CALCIUM SERPL-MCNC: 8.6 MG/DL (ref 8.3–10.6)
CALCIUM SERPL-MCNC: 8.7 MG/DL (ref 8.3–10.6)
CALCIUM SERPL-MCNC: 8.7 MG/DL (ref 8.3–10.6)
CALCIUM SERPL-MCNC: 8.9 MG/DL (ref 8.3–10.6)
CARBOXYHEMOGLOBIN: 3.7 % (ref 0–1.5)
CHLORIDE BLD-SCNC: 102 MMOL/L (ref 99–110)
CHLORIDE BLD-SCNC: 102 MMOL/L (ref 99–110)
CHLORIDE BLD-SCNC: 104 MMOL/L (ref 99–110)
CHLORIDE BLD-SCNC: 105 MMOL/L (ref 99–110)
CHLORIDE BLD-SCNC: 106 MMOL/L (ref 99–110)
CHLORIDE BLD-SCNC: 106 MMOL/L (ref 99–110)
CHLORIDE BLD-SCNC: 107 MMOL/L (ref 99–110)
CHLORIDE BLD-SCNC: 108 MMOL/L (ref 99–110)
CHLORIDE BLD-SCNC: 99 MMOL/L (ref 99–110)
CHLORIDE BLD-SCNC: 99 MMOL/L (ref 99–110)
CLARITY: CLEAR
CLARITY: CLEAR
CO2: 26 MMOL/L (ref 21–32)
CO2: 27 MMOL/L (ref 21–32)
CO2: 28 MMOL/L (ref 21–32)
CO2: 28 MMOL/L (ref 21–32)
CO2: 29 MMOL/L (ref 21–32)
CO2: 30 MMOL/L (ref 21–32)
CO2: 30 MMOL/L (ref 21–32)
CO2: 31 MMOL/L (ref 21–32)
CO2: 32 MMOL/L (ref 21–32)
CO2: 32 MMOL/L (ref 21–32)
COLOR: YELLOW
COLOR: YELLOW
CREAT SERPL-MCNC: 1 MG/DL (ref 0.8–1.3)
CREAT SERPL-MCNC: 1.1 MG/DL (ref 0.8–1.3)
CREAT SERPL-MCNC: 1.1 MG/DL (ref 0.8–1.3)
CREAT SERPL-MCNC: 1.2 MG/DL (ref 0.8–1.3)
CREAT SERPL-MCNC: 1.2 MG/DL (ref 0.8–1.3)
CREAT SERPL-MCNC: 1.3 MG/DL (ref 0.8–1.3)
CREAT SERPL-MCNC: 1.4 MG/DL (ref 0.8–1.3)
CREAT SERPL-MCNC: 1.4 MG/DL (ref 0.8–1.3)
CULTURE, BLOOD 2: NORMAL
EKG ATRIAL RATE: 150 BPM
EKG ATRIAL RATE: 60 BPM
EKG ATRIAL RATE: 64 BPM
EKG ATRIAL RATE: 80 BPM
EKG ATRIAL RATE: 98 BPM
EKG DIAGNOSIS: NORMAL
EKG P AXIS: 75 DEGREES
EKG P AXIS: 78 DEGREES
EKG P AXIS: 80 DEGREES
EKG P AXIS: 84 DEGREES
EKG P-R INTERVAL: 144 MS
EKG P-R INTERVAL: 146 MS
EKG P-R INTERVAL: 148 MS
EKG P-R INTERVAL: 154 MS
EKG Q-T INTERVAL: 254 MS
EKG Q-T INTERVAL: 362 MS
EKG Q-T INTERVAL: 376 MS
EKG Q-T INTERVAL: 454 MS
EKG Q-T INTERVAL: 472 MS
EKG QRS DURATION: 82 MS
EKG QRS DURATION: 88 MS
EKG QRS DURATION: 90 MS
EKG QRS DURATION: 94 MS
EKG QRS DURATION: 96 MS
EKG QTC CALCULATION (BAZETT): 430 MS
EKG QTC CALCULATION (BAZETT): 433 MS
EKG QTC CALCULATION (BAZETT): 454 MS
EKG QTC CALCULATION (BAZETT): 462 MS
EKG QTC CALCULATION (BAZETT): 486 MS
EKG R AXIS: 37 DEGREES
EKG R AXIS: 52 DEGREES
EKG R AXIS: 73 DEGREES
EKG R AXIS: 81 DEGREES
EKG R AXIS: 82 DEGREES
EKG T AXIS: -78 DEGREES
EKG T AXIS: 265 DEGREES
EKG T AXIS: 266 DEGREES
EKG T AXIS: 43 DEGREES
EKG T AXIS: 88 DEGREES
EKG VENTRICULAR RATE: 173 BPM
EKG VENTRICULAR RATE: 60 BPM
EKG VENTRICULAR RATE: 64 BPM
EKG VENTRICULAR RATE: 80 BPM
EKG VENTRICULAR RATE: 98 BPM
EOSINOPHILS ABSOLUTE: 0 K/UL (ref 0–0.6)
EOSINOPHILS ABSOLUTE: 0.1 K/UL (ref 0–0.6)
EOSINOPHILS ABSOLUTE: 0.2 K/UL (ref 0–0.6)
EOSINOPHILS ABSOLUTE: 0.2 K/UL (ref 0–0.6)
EOSINOPHILS ABSOLUTE: 0.3 K/UL (ref 0–0.6)
EOSINOPHILS RELATIVE PERCENT: 0.3 %
EOSINOPHILS RELATIVE PERCENT: 0.7 %
EOSINOPHILS RELATIVE PERCENT: 0.9 %
EOSINOPHILS RELATIVE PERCENT: 2.3 %
EOSINOPHILS RELATIVE PERCENT: 2.9 %
EOSINOPHILS RELATIVE PERCENT: 3.8 %
EOSINOPHILS RELATIVE PERCENT: 4.9 %
ESTIMATED AVERAGE GLUCOSE: 122.6 MG/DL
ETHANOL: NORMAL MG/DL (ref 0–0.08)
FERRITIN: 72.5 NG/ML (ref 30–400)
GFR AFRICAN AMERICAN: 47
GFR AFRICAN AMERICAN: 59
GFR AFRICAN AMERICAN: 59
GFR AFRICAN AMERICAN: >60
GFR NON-AFRICAN AMERICAN: 39
GFR NON-AFRICAN AMERICAN: 49
GFR NON-AFRICAN AMERICAN: 49
GFR NON-AFRICAN AMERICAN: 53
GFR NON-AFRICAN AMERICAN: 59
GFR NON-AFRICAN AMERICAN: 59
GFR NON-AFRICAN AMERICAN: >60
GLOBULIN: 2.9 G/DL
GLOBULIN: 2.9 G/DL
GLOBULIN: 3 G/DL
GLOBULIN: 3.1 G/DL
GLOBULIN: 3.3 G/DL
GLUCOSE BLD-MCNC: 103 MG/DL (ref 70–99)
GLUCOSE BLD-MCNC: 107 MG/DL (ref 70–99)
GLUCOSE BLD-MCNC: 109 MG/DL (ref 70–99)
GLUCOSE BLD-MCNC: 113 MG/DL (ref 70–99)
GLUCOSE BLD-MCNC: 118 MG/DL (ref 70–99)
GLUCOSE BLD-MCNC: 127 MG/DL (ref 70–99)
GLUCOSE BLD-MCNC: 129 MG/DL (ref 70–99)
GLUCOSE BLD-MCNC: 132 MG/DL (ref 70–99)
GLUCOSE BLD-MCNC: 134 MG/DL (ref 70–99)
GLUCOSE BLD-MCNC: 143 MG/DL (ref 70–99)
GLUCOSE BLD-MCNC: 145 MG/DL (ref 70–99)
GLUCOSE BLD-MCNC: 151 MG/DL (ref 70–99)
GLUCOSE BLD-MCNC: 158 MG/DL (ref 70–99)
GLUCOSE BLD-MCNC: 160 MG/DL (ref 70–99)
GLUCOSE BLD-MCNC: 161 MG/DL (ref 70–99)
GLUCOSE BLD-MCNC: 161 MG/DL (ref 70–99)
GLUCOSE BLD-MCNC: 171 MG/DL (ref 70–99)
GLUCOSE BLD-MCNC: 172 MG/DL (ref 70–99)
GLUCOSE BLD-MCNC: 172 MG/DL (ref 70–99)
GLUCOSE BLD-MCNC: 174 MG/DL (ref 70–99)
GLUCOSE BLD-MCNC: 175 MG/DL (ref 70–99)
GLUCOSE BLD-MCNC: 183 MG/DL (ref 70–99)
GLUCOSE BLD-MCNC: 189 MG/DL (ref 70–99)
GLUCOSE BLD-MCNC: 191 MG/DL (ref 70–99)
GLUCOSE BLD-MCNC: 191 MG/DL (ref 70–99)
GLUCOSE BLD-MCNC: 219 MG/DL (ref 70–99)
GLUCOSE BLD-MCNC: 228 MG/DL (ref 70–99)
GLUCOSE BLD-MCNC: 241 MG/DL (ref 70–99)
GLUCOSE BLD-MCNC: 267 MG/DL (ref 70–99)
GLUCOSE BLD-MCNC: 65 MG/DL (ref 70–99)
GLUCOSE BLD-MCNC: 68 MG/DL (ref 70–99)
GLUCOSE BLD-MCNC: 69 MG/DL (ref 70–99)
GLUCOSE BLD-MCNC: 73 MG/DL (ref 70–99)
GLUCOSE BLD-MCNC: 74 MG/DL (ref 70–99)
GLUCOSE BLD-MCNC: 77 MG/DL (ref 70–99)
GLUCOSE BLD-MCNC: 78 MG/DL (ref 70–99)
GLUCOSE BLD-MCNC: 83 MG/DL (ref 70–99)
GLUCOSE BLD-MCNC: 95 MG/DL (ref 70–99)
GLUCOSE BLD-MCNC: 97 MG/DL (ref 70–99)
GLUCOSE URINE: NEGATIVE MG/DL
GLUCOSE URINE: NEGATIVE MG/DL
HBA1C MFR BLD: 5.9 %
HCO3 ARTERIAL: 28.5 MMOL/L (ref 21–29)
HCO3 VENOUS: 31.9 MMOL/L (ref 23–29)
HCT VFR BLD CALC: 26.9 % (ref 40.5–52.5)
HCT VFR BLD CALC: 27.7 % (ref 40.5–52.5)
HCT VFR BLD CALC: 28.4 % (ref 40.5–52.5)
HCT VFR BLD CALC: 28.5 % (ref 40.5–52.5)
HCT VFR BLD CALC: 29.5 % (ref 40.5–52.5)
HCT VFR BLD CALC: 33.1 % (ref 40.5–52.5)
HCT VFR BLD CALC: 33.6 % (ref 40.5–52.5)
HCT VFR BLD CALC: 34.2 % (ref 40.5–52.5)
HCT VFR BLD CALC: 35.2 % (ref 40.5–52.5)
HCT VFR BLD CALC: 35.2 % (ref 40.5–52.5)
HCT VFR BLD CALC: 37.3 % (ref 40.5–52.5)
HCT VFR BLD CALC: 37.8 % (ref 40.5–52.5)
HCT VFR BLD CALC: 38.1 % (ref 40.5–52.5)
HCT VFR BLD CALC: 38.5 % (ref 40.5–52.5)
HCT VFR BLD CALC: 41.6 % (ref 40.5–52.5)
HCT VFR BLD CALC: 42.3 % (ref 40.5–52.5)
HCT VFR BLD CALC: 46.2 % (ref 40.5–52.5)
HEMOGLOBIN: 10.6 G/DL (ref 13.5–17.5)
HEMOGLOBIN: 11.3 G/DL (ref 13.5–17.5)
HEMOGLOBIN: 11.4 G/DL (ref 13.5–17.5)
HEMOGLOBIN: 11.9 G/DL (ref 13.5–17.5)
HEMOGLOBIN: 12 G/DL (ref 13.5–17.5)
HEMOGLOBIN: 12.4 G/DL (ref 13.5–17.5)
HEMOGLOBIN: 12.4 G/DL (ref 13.5–17.5)
HEMOGLOBIN: 12.8 G/DL (ref 13.5–17.5)
HEMOGLOBIN: 13 G/DL (ref 13.5–17.5)
HEMOGLOBIN: 13.6 G/DL (ref 13.5–17.5)
HEMOGLOBIN: 14.1 G/DL (ref 13.5–17.5)
HEMOGLOBIN: 15.3 G/DL (ref 13.5–17.5)
HEMOGLOBIN: 9 G/DL (ref 13.5–17.5)
HEMOGLOBIN: 9.2 G/DL (ref 13.5–17.5)
HEMOGLOBIN: 9.2 GM/DL (ref 13.5–17.5)
HEMOGLOBIN: 9.3 G/DL (ref 13.5–17.5)
HEMOGLOBIN: 9.3 G/DL (ref 13.5–17.5)
HEMOGLOBIN: 9.7 G/DL (ref 13.5–17.5)
INR BLD: 0.99 (ref 0.88–1.12)
INR BLD: 1.07 (ref 0.86–1.14)
INR BLD: 1.09 (ref 0.86–1.14)
IRON SATURATION: 29 % (ref 20–50)
IRON: 84 UG/DL (ref 59–158)
KETONES, URINE: NEGATIVE MG/DL
KETONES, URINE: NEGATIVE MG/DL
LACTIC ACID, SEPSIS: 1.3 MMOL/L (ref 0.4–1.9)
LACTIC ACID: 1 MMOL/L (ref 0.4–2)
LACTIC ACID: 1.1 MMOL/L (ref 0.4–2)
LACTIC ACID: 2.7 MMOL/L (ref 0.4–2)
LEUKOCYTE ESTERASE, URINE: NEGATIVE
LEUKOCYTE ESTERASE, URINE: NEGATIVE
LIPASE: 19 U/L (ref 13–60)
LV EF: 15 %
LV EF: 20 %
LVEF MODALITY: NORMAL
LVEF MODALITY: NORMAL
LYMPHOCYTES ABSOLUTE: 0.5 K/UL (ref 1–5.1)
LYMPHOCYTES ABSOLUTE: 0.5 K/UL (ref 1–5.1)
LYMPHOCYTES ABSOLUTE: 0.7 K/UL (ref 1–5.1)
LYMPHOCYTES ABSOLUTE: 0.8 K/UL (ref 1–5.1)
LYMPHOCYTES ABSOLUTE: 0.9 K/UL (ref 1–5.1)
LYMPHOCYTES RELATIVE PERCENT: 11.1 %
LYMPHOCYTES RELATIVE PERCENT: 11.6 %
LYMPHOCYTES RELATIVE PERCENT: 11.6 %
LYMPHOCYTES RELATIVE PERCENT: 13.4 %
LYMPHOCYTES RELATIVE PERCENT: 5.1 %
LYMPHOCYTES RELATIVE PERCENT: 5.2 %
LYMPHOCYTES RELATIVE PERCENT: 5.9 %
MAGNESIUM: 1.6 MG/DL (ref 1.8–2.4)
MAGNESIUM: 1.7 MG/DL (ref 1.8–2.4)
MAGNESIUM: 1.8 MG/DL (ref 1.8–2.4)
MAGNESIUM: 1.9 MG/DL (ref 1.8–2.4)
MAGNESIUM: 2 MG/DL (ref 1.8–2.4)
MAGNESIUM: 2.3 MG/DL (ref 1.8–2.4)
MCH RBC QN AUTO: 26.6 PG (ref 26–34)
MCH RBC QN AUTO: 26.9 PG (ref 26–34)
MCH RBC QN AUTO: 27.1 PG (ref 26–34)
MCH RBC QN AUTO: 27.2 PG (ref 26–34)
MCH RBC QN AUTO: 27.3 PG (ref 26–34)
MCH RBC QN AUTO: 27.4 PG (ref 26–34)
MCH RBC QN AUTO: 27.7 PG (ref 26–34)
MCH RBC QN AUTO: 29.5 PG (ref 26–34)
MCH RBC QN AUTO: 29.8 PG (ref 26–34)
MCH RBC QN AUTO: 30 PG (ref 26–34)
MCHC RBC AUTO-ENTMCNC: 32.1 G/DL (ref 31–36)
MCHC RBC AUTO-ENTMCNC: 32.4 G/DL (ref 31–36)
MCHC RBC AUTO-ENTMCNC: 32.5 G/DL (ref 31–36)
MCHC RBC AUTO-ENTMCNC: 32.7 G/DL (ref 31–36)
MCHC RBC AUTO-ENTMCNC: 32.8 G/DL (ref 31–36)
MCHC RBC AUTO-ENTMCNC: 32.9 G/DL (ref 31–36)
MCHC RBC AUTO-ENTMCNC: 33.1 G/DL (ref 31–36)
MCHC RBC AUTO-ENTMCNC: 33.5 G/DL (ref 31–36)
MCHC RBC AUTO-ENTMCNC: 33.6 G/DL (ref 31–36)
MCHC RBC AUTO-ENTMCNC: 34 G/DL (ref 31–36)
MCHC RBC AUTO-ENTMCNC: 34.1 G/DL (ref 31–36)
MCHC RBC AUTO-ENTMCNC: 34.7 G/DL (ref 31–36)
MCV RBC AUTO: 81.4 FL (ref 80–100)
MCV RBC AUTO: 81.5 FL (ref 80–100)
MCV RBC AUTO: 82 FL (ref 80–100)
MCV RBC AUTO: 82.4 FL (ref 80–100)
MCV RBC AUTO: 83 FL (ref 80–100)
MCV RBC AUTO: 83.6 FL (ref 80–100)
MCV RBC AUTO: 85.6 FL (ref 80–100)
MCV RBC AUTO: 86.5 FL (ref 80–100)
MCV RBC AUTO: 87.3 FL (ref 80–100)
MCV RBC AUTO: 87.6 FL (ref 80–100)
MCV RBC AUTO: 90 FL (ref 80–100)
MCV RBC AUTO: 90.1 FL (ref 80–100)
METHEMOGLOBIN VENOUS: 0.4 %
MICROSCOPIC EXAMINATION: YES
MICROSCOPIC EXAMINATION: YES
MONOCYTES ABSOLUTE: 0.4 K/UL (ref 0–1.3)
MONOCYTES ABSOLUTE: 0.6 K/UL (ref 0–1.3)
MONOCYTES ABSOLUTE: 0.7 K/UL (ref 0–1.3)
MONOCYTES ABSOLUTE: 0.8 K/UL (ref 0–1.3)
MONOCYTES ABSOLUTE: 0.8 K/UL (ref 0–1.3)
MONOCYTES RELATIVE PERCENT: 10.1 %
MONOCYTES RELATIVE PERCENT: 10.4 %
MONOCYTES RELATIVE PERCENT: 10.5 %
MONOCYTES RELATIVE PERCENT: 3.9 %
MONOCYTES RELATIVE PERCENT: 5.9 %
MONOCYTES RELATIVE PERCENT: 8.9 %
MONOCYTES RELATIVE PERCENT: 8.9 %
NEUTROPHILS ABSOLUTE: 12.3 K/UL (ref 1.7–7.7)
NEUTROPHILS ABSOLUTE: 4.5 K/UL (ref 1.7–7.7)
NEUTROPHILS ABSOLUTE: 4.7 K/UL (ref 1.7–7.7)
NEUTROPHILS ABSOLUTE: 4.8 K/UL (ref 1.7–7.7)
NEUTROPHILS ABSOLUTE: 5.6 K/UL (ref 1.7–7.7)
NEUTROPHILS ABSOLUTE: 7.6 K/UL (ref 1.7–7.7)
NEUTROPHILS ABSOLUTE: 8.8 K/UL (ref 1.7–7.7)
NEUTROPHILS RELATIVE PERCENT: 72.8 %
NEUTROPHILS RELATIVE PERCENT: 72.9 %
NEUTROPHILS RELATIVE PERCENT: 73.4 %
NEUTROPHILS RELATIVE PERCENT: 75.8 %
NEUTROPHILS RELATIVE PERCENT: 84.2 %
NEUTROPHILS RELATIVE PERCENT: 87.3 %
NEUTROPHILS RELATIVE PERCENT: 90 %
NITRITE, URINE: NEGATIVE
NITRITE, URINE: NEGATIVE
O2 SAT, ARTERIAL: 96 % (ref 93–100)
O2 SAT, VEN: 89 %
O2 THERAPY: ABNORMAL
OCCULT BLOOD SCREENING: NORMAL
ORGANISM: ABNORMAL
PCO2 ARTERIAL: 51.7 MM HG (ref 35–45)
PCO2, VEN: 51.1 MMHG (ref 40–50)
PDW BLD-RTO: 13.6 % (ref 12.4–15.4)
PDW BLD-RTO: 13.7 % (ref 12.4–15.4)
PDW BLD-RTO: 13.9 % (ref 12.4–15.4)
PDW BLD-RTO: 14 % (ref 12.4–15.4)
PDW BLD-RTO: 14.1 % (ref 12.4–15.4)
PDW BLD-RTO: 14.1 % (ref 12.4–15.4)
PDW BLD-RTO: 14.5 % (ref 12.4–15.4)
PDW BLD-RTO: 14.6 % (ref 12.4–15.4)
PDW BLD-RTO: 14.6 % (ref 12.4–15.4)
PDW BLD-RTO: 14.8 % (ref 12.4–15.4)
PDW BLD-RTO: 14.9 % (ref 12.4–15.4)
PDW BLD-RTO: 15 % (ref 12.4–15.4)
PERFORMED ON: ABNORMAL
PERFORMED ON: NORMAL
PH ARTERIAL: 7.35 (ref 7.35–7.45)
PH UA: 5.5 (ref 5–8)
PH UA: 6 (ref 5–8)
PH VENOUS: 7.41 (ref 7.35–7.45)
PHOSPHORUS: 2.7 MG/DL (ref 2.5–4.9)
PLATELET # BLD: 131 K/UL (ref 135–450)
PLATELET # BLD: 142 K/UL (ref 135–450)
PLATELET # BLD: 154 K/UL (ref 135–450)
PLATELET # BLD: 160 K/UL (ref 135–450)
PLATELET # BLD: 169 K/UL (ref 135–450)
PLATELET # BLD: 179 K/UL (ref 135–450)
PLATELET # BLD: 183 K/UL (ref 135–450)
PLATELET # BLD: 194 K/UL (ref 135–450)
PLATELET # BLD: 194 K/UL (ref 135–450)
PLATELET # BLD: 200 K/UL (ref 135–450)
PLATELET # BLD: 221 K/UL (ref 135–450)
PLATELET # BLD: 229 K/UL (ref 135–450)
PMV BLD AUTO: 8.7 FL (ref 5–10.5)
PMV BLD AUTO: 8.7 FL (ref 5–10.5)
PMV BLD AUTO: 8.9 FL (ref 5–10.5)
PMV BLD AUTO: 9.1 FL (ref 5–10.5)
PMV BLD AUTO: 9.1 FL (ref 5–10.5)
PMV BLD AUTO: 9.2 FL (ref 5–10.5)
PMV BLD AUTO: 9.3 FL (ref 5–10.5)
PMV BLD AUTO: 9.4 FL (ref 5–10.5)
PMV BLD AUTO: 9.5 FL (ref 5–10.5)
PMV BLD AUTO: 9.9 FL (ref 5–10.5)
PO2 ARTERIAL: 86.6 MM HG (ref 75–108)
PO2, VEN: 58.5 MMHG (ref 25–40)
POC ACT LR: 168 SEC
POC ACT LR: 231 SEC
POC ACT LR: 283 SEC
POC ACT LR: 301 SEC
POC ACT LR: >400 SEC
POC CHLORIDE: 104 MMOL/L (ref 99–110)
POC CREATININE: 1.7 MG/DL (ref 0.8–1.3)
POC HEMATOCRIT: 27 % (ref 40.5–52.5)
POC POTASSIUM: 2.9 MMOL/L (ref 3.5–5.1)
POC SAMPLE TYPE: ABNORMAL
POC SODIUM: 144 MMOL/L (ref 136–145)
POTASSIUM REFLEX MAGNESIUM: 3 MMOL/L (ref 3.5–5.1)
POTASSIUM REFLEX MAGNESIUM: 3.4 MMOL/L (ref 3.5–5.1)
POTASSIUM REFLEX MAGNESIUM: 3.4 MMOL/L (ref 3.5–5.1)
POTASSIUM REFLEX MAGNESIUM: 3.5 MMOL/L (ref 3.5–5.1)
POTASSIUM REFLEX MAGNESIUM: 4.4 MMOL/L (ref 3.5–5.1)
POTASSIUM SERPL-SCNC: 3 MMOL/L (ref 3.5–5.1)
POTASSIUM SERPL-SCNC: 3.2 MMOL/L (ref 3.5–5.1)
POTASSIUM SERPL-SCNC: 3.2 MMOL/L (ref 3.5–5.1)
POTASSIUM SERPL-SCNC: 3.7 MMOL/L (ref 3.5–5.1)
POTASSIUM SERPL-SCNC: 3.7 MMOL/L (ref 3.5–5.1)
PRO-BNP: 4273 PG/ML (ref 0–449)
PRO-BNP: 5759 PG/ML (ref 0–449)
PROTEIN UA: 100 MG/DL
PROTEIN UA: 30 MG/DL
PROTHROMBIN TIME: 11.2 SEC (ref 9.9–12.7)
PROTHROMBIN TIME: 12.4 SEC (ref 10–13.2)
PROTHROMBIN TIME: 12.7 SEC (ref 10–13.2)
RBC # BLD: 3.31 M/UL (ref 4.2–5.9)
RBC # BLD: 3.4 M/UL (ref 4.2–5.9)
RBC # BLD: 3.43 M/UL (ref 4.2–5.9)
RBC # BLD: 3.47 M/UL (ref 4.2–5.9)
RBC # BLD: 3.58 M/UL (ref 4.2–5.9)
RBC # BLD: 3.95 M/UL (ref 4.2–5.9)
RBC # BLD: 3.95 M/UL (ref 4.2–5.9)
RBC # BLD: 4.02 M/UL (ref 4.2–5.9)
RBC # BLD: 4.12 M/UL (ref 4.2–5.9)
RBC # BLD: 4.37 M/UL (ref 4.2–5.9)
RBC # BLD: 4.61 M/UL (ref 4.2–5.9)
RBC # BLD: 5.13 M/UL (ref 4.2–5.9)
RBC UA: ABNORMAL /HPF (ref 0–4)
RBC UA: ABNORMAL /HPF (ref 0–4)
SARS-COV-2, NAAT: NOT DETECTED
SARS-COV-2: NOT DETECTED
SARS-COV-2: NOT DETECTED
SODIUM BLD-SCNC: 139 MMOL/L (ref 136–145)
SODIUM BLD-SCNC: 141 MMOL/L (ref 136–145)
SODIUM BLD-SCNC: 142 MMOL/L (ref 136–145)
SODIUM BLD-SCNC: 142 MMOL/L (ref 136–145)
SODIUM BLD-SCNC: 143 MMOL/L (ref 136–145)
SODIUM BLD-SCNC: 144 MMOL/L (ref 136–145)
SPECIFIC GRAVITY UA: 1.01 (ref 1–1.03)
SPECIFIC GRAVITY UA: 1.02 (ref 1–1.03)
TCO2 ARTERIAL: 30 MMOL/L
TCO2 CALC VENOUS: 34 MMOL/L
TOTAL CK: 102 U/L (ref 39–308)
TOTAL IRON BINDING CAPACITY: 293 UG/DL (ref 260–445)
TOTAL PROTEIN: 5.5 G/DL (ref 6.4–8.2)
TOTAL PROTEIN: 5.9 G/DL (ref 6.4–8.2)
TOTAL PROTEIN: 6.1 G/DL (ref 6.4–8.2)
TOTAL PROTEIN: 6.4 G/DL (ref 6.4–8.2)
TOTAL PROTEIN: 6.5 G/DL (ref 6.4–8.2)
TOTAL PROTEIN: 6.5 G/DL (ref 6.4–8.2)
TOTAL PROTEIN: 7 G/DL (ref 6.4–8.2)
TROPONIN: 0.01 NG/ML
TROPONIN: 0.36 NG/ML
TROPONIN: 1.59 NG/ML
TROPONIN: 1.61 NG/ML
URINE CULTURE, ROUTINE: ABNORMAL
URINE REFLEX TO CULTURE: ABNORMAL
URINE TYPE: ABNORMAL
URINE TYPE: ABNORMAL
UROBILINOGEN, URINE: 0.2 E.U./DL
UROBILINOGEN, URINE: 1 E.U./DL
WBC # BLD: 10.4 K/UL (ref 4–11)
WBC # BLD: 14 K/UL (ref 4–11)
WBC # BLD: 6.2 K/UL (ref 4–11)
WBC # BLD: 6.3 K/UL (ref 4–11)
WBC # BLD: 6.6 K/UL (ref 4–11)
WBC # BLD: 7.2 K/UL (ref 4–11)
WBC # BLD: 7.5 K/UL (ref 4–11)
WBC # BLD: 8.2 K/UL (ref 4–11)
WBC # BLD: 9 K/UL (ref 4–11)
WBC # BLD: 9.1 K/UL (ref 4–11)
WBC # BLD: 9.2 K/UL (ref 4–11)
WBC # BLD: 9.8 K/UL (ref 4–11)
WBC UA: ABNORMAL /HPF (ref 0–5)
WBC UA: ABNORMAL /HPF (ref 0–5)

## 2021-01-01 PROCEDURE — 93010 ELECTROCARDIOGRAM REPORT: CPT | Performed by: INTERNAL MEDICINE

## 2021-01-01 PROCEDURE — 99232 SBSQ HOSP IP/OBS MODERATE 35: CPT | Performed by: INTERNAL MEDICINE

## 2021-01-01 PROCEDURE — 6370000000 HC RX 637 (ALT 250 FOR IP): Performed by: INTERNAL MEDICINE

## 2021-01-01 PROCEDURE — 87635 SARS-COV-2 COVID-19 AMP PRB: CPT

## 2021-01-01 PROCEDURE — 2500000003 HC RX 250 WO HCPCS: Performed by: NURSE ANESTHETIST, CERTIFIED REGISTERED

## 2021-01-01 PROCEDURE — 84295 ASSAY OF SERUM SODIUM: CPT

## 2021-01-01 PROCEDURE — 84484 ASSAY OF TROPONIN QUANT: CPT

## 2021-01-01 PROCEDURE — 80053 COMPREHEN METABOLIC PANEL: CPT

## 2021-01-01 PROCEDURE — 94640 AIRWAY INHALATION TREATMENT: CPT

## 2021-01-01 PROCEDURE — 6370000000 HC RX 637 (ALT 250 FOR IP): Performed by: ORTHOPAEDIC SURGERY

## 2021-01-01 PROCEDURE — 83605 ASSAY OF LACTIC ACID: CPT

## 2021-01-01 PROCEDURE — G0328 FECAL BLOOD SCRN IMMUNOASSAY: HCPCS

## 2021-01-01 PROCEDURE — 2700000000 HC OXYGEN THERAPY PER DAY

## 2021-01-01 PROCEDURE — 4040F PNEUMOC VAC/ADMIN/RCVD: CPT | Performed by: INTERNAL MEDICINE

## 2021-01-01 PROCEDURE — 2000000000 HC ICU R&B

## 2021-01-01 PROCEDURE — 71250 CT THORAX DX C-: CPT

## 2021-01-01 PROCEDURE — 1200000000 HC SEMI PRIVATE

## 2021-01-01 PROCEDURE — 97110 THERAPEUTIC EXERCISES: CPT

## 2021-01-01 PROCEDURE — 7100000000 HC PACU RECOVERY - FIRST 15 MIN: Performed by: ORTHOPAEDIC SURGERY

## 2021-01-01 PROCEDURE — 99024 POSTOP FOLLOW-UP VISIT: CPT | Performed by: ORTHOPAEDIC SURGERY

## 2021-01-01 PROCEDURE — 85730 THROMBOPLASTIN TIME PARTIAL: CPT

## 2021-01-01 PROCEDURE — 94761 N-INVAS EAR/PLS OXIMETRY MLT: CPT

## 2021-01-01 PROCEDURE — 85025 COMPLETE CBC W/AUTO DIFF WBC: CPT

## 2021-01-01 PROCEDURE — 97167 OT EVAL HIGH COMPLEX 60 MIN: CPT

## 2021-01-01 PROCEDURE — 3609013000 HC EGD TRANSORAL CONTROL BLEEDING ANY METHOD: Performed by: INTERNAL MEDICINE

## 2021-01-01 PROCEDURE — 6360000002 HC RX W HCPCS: Performed by: INTERNAL MEDICINE

## 2021-01-01 PROCEDURE — 85347 COAGULATION TIME ACTIVATED: CPT

## 2021-01-01 PROCEDURE — 2580000003 HC RX 258: Performed by: ANESTHESIOLOGY

## 2021-01-01 PROCEDURE — 99223 1ST HOSP IP/OBS HIGH 75: CPT | Performed by: INTERNAL MEDICINE

## 2021-01-01 PROCEDURE — 99291 CRITICAL CARE FIRST HOUR: CPT | Performed by: INTERNAL MEDICINE

## 2021-01-01 PROCEDURE — 6360000002 HC RX W HCPCS

## 2021-01-01 PROCEDURE — 6360000002 HC RX W HCPCS: Performed by: PHYSICIAN ASSISTANT

## 2021-01-01 PROCEDURE — 6360000002 HC RX W HCPCS: Performed by: ORTHOPAEDIC SURGERY

## 2021-01-01 PROCEDURE — 2580000003 HC RX 258: Performed by: PHYSICIAN ASSISTANT

## 2021-01-01 PROCEDURE — 93005 ELECTROCARDIOGRAM TRACING: CPT | Performed by: INTERNAL MEDICINE

## 2021-01-01 PROCEDURE — 96375 TX/PRO/DX INJ NEW DRUG ADDON: CPT

## 2021-01-01 PROCEDURE — 7100000001 HC PACU RECOVERY - ADDTL 15 MIN: Performed by: ORTHOPAEDIC SURGERY

## 2021-01-01 PROCEDURE — C9113 INJ PANTOPRAZOLE SODIUM, VIA: HCPCS | Performed by: INTERNAL MEDICINE

## 2021-01-01 PROCEDURE — 82330 ASSAY OF CALCIUM: CPT

## 2021-01-01 PROCEDURE — 83880 ASSAY OF NATRIURETIC PEPTIDE: CPT

## 2021-01-01 PROCEDURE — 84100 ASSAY OF PHOSPHORUS: CPT

## 2021-01-01 PROCEDURE — APPNB30 APP NON BILLABLE TIME 0-30 MINS: Performed by: PHYSICIAN ASSISTANT

## 2021-01-01 PROCEDURE — 83735 ASSAY OF MAGNESIUM: CPT

## 2021-01-01 PROCEDURE — 83550 IRON BINDING TEST: CPT

## 2021-01-01 PROCEDURE — C1713 ANCHOR/SCREW BN/BN,TIS/BN: HCPCS | Performed by: ORTHOPAEDIC SURGERY

## 2021-01-01 PROCEDURE — U0003 INFECTIOUS AGENT DETECTION BY NUCLEIC ACID (DNA OR RNA); SEVERE ACUTE RESPIRATORY SYNDROME CORONAVIRUS 2 (SARS-COV-2) (CORONAVIRUS DISEASE [COVID-19]), AMPLIFIED PROBE TECHNIQUE, MAKING USE OF HIGH THROUGHPUT TECHNOLOGIES AS DESCRIBED BY CMS-2020-01-R: HCPCS

## 2021-01-01 PROCEDURE — 82947 ASSAY GLUCOSE BLOOD QUANT: CPT

## 2021-01-01 PROCEDURE — 87449 NOS EACH ORGANISM AG IA: CPT

## 2021-01-01 PROCEDURE — 02HA3RJ INSERTION OF SHORT-TERM EXTERNAL HEART ASSIST SYSTEM INTO HEART, INTRAOPERATIVE, PERCUTANEOUS APPROACH: ICD-10-PCS | Performed by: INTERNAL MEDICINE

## 2021-01-01 PROCEDURE — 80048 BASIC METABOLIC PNL TOTAL CA: CPT

## 2021-01-01 PROCEDURE — 97116 GAIT TRAINING THERAPY: CPT

## 2021-01-01 PROCEDURE — 6360000002 HC RX W HCPCS: Performed by: NURSE PRACTITIONER

## 2021-01-01 PROCEDURE — 96366 THER/PROPH/DIAG IV INF ADDON: CPT

## 2021-01-01 PROCEDURE — 96365 THER/PROPH/DIAG IV INF INIT: CPT

## 2021-01-01 PROCEDURE — 36415 COLL VENOUS BLD VENIPUNCTURE: CPT

## 2021-01-01 PROCEDURE — 87040 BLOOD CULTURE FOR BACTERIA: CPT

## 2021-01-01 PROCEDURE — 94726 PLETHYSMOGRAPHY LUNG VOLUMES: CPT

## 2021-01-01 PROCEDURE — 85014 HEMATOCRIT: CPT

## 2021-01-01 PROCEDURE — 4004F PT TOBACCO SCREEN RCVD TLK: CPT | Performed by: INTERNAL MEDICINE

## 2021-01-01 PROCEDURE — 87086 URINE CULTURE/COLONY COUNT: CPT

## 2021-01-01 PROCEDURE — 82565 ASSAY OF CREATININE: CPT

## 2021-01-01 PROCEDURE — 2580000003 HC RX 258: Performed by: INTERNAL MEDICINE

## 2021-01-01 PROCEDURE — 93005 ELECTROCARDIOGRAM TRACING: CPT | Performed by: PHYSICIAN ASSISTANT

## 2021-01-01 PROCEDURE — 81001 URINALYSIS AUTO W/SCOPE: CPT

## 2021-01-01 PROCEDURE — 99204 OFFICE O/P NEW MOD 45 MIN: CPT | Performed by: INTERNAL MEDICINE

## 2021-01-01 PROCEDURE — 2500000003 HC RX 250 WO HCPCS

## 2021-01-01 PROCEDURE — 0DJ08ZZ INSPECTION OF UPPER INTESTINAL TRACT, VIA NATURAL OR ARTIFICIAL OPENING ENDOSCOPIC: ICD-10-PCS | Performed by: INTERNAL MEDICINE

## 2021-01-01 PROCEDURE — 6370000000 HC RX 637 (ALT 250 FOR IP)

## 2021-01-01 PROCEDURE — 2720000010 HC SURG SUPPLY STERILE

## 2021-01-01 PROCEDURE — 6370000000 HC RX 637 (ALT 250 FOR IP): Performed by: PHYSICIAN ASSISTANT

## 2021-01-01 PROCEDURE — 5A0221D ASSISTANCE WITH CARDIAC OUTPUT USING IMPELLER PUMP, CONTINUOUS: ICD-10-PCS | Performed by: INTERNAL MEDICINE

## 2021-01-01 PROCEDURE — 84132 ASSAY OF SERUM POTASSIUM: CPT

## 2021-01-01 PROCEDURE — 87324 CLOSTRIDIUM AG IA: CPT

## 2021-01-01 PROCEDURE — 1123F ACP DISCUSS/DSCN MKR DOCD: CPT | Performed by: INTERNAL MEDICINE

## 2021-01-01 PROCEDURE — 72131 CT LUMBAR SPINE W/O DYE: CPT

## 2021-01-01 PROCEDURE — C1894 INTRO/SHEATH, NON-LASER: HCPCS

## 2021-01-01 PROCEDURE — 2580000003 HC RX 258: Performed by: ORTHOPAEDIC SURGERY

## 2021-01-01 PROCEDURE — 36592 COLLECT BLOOD FROM PICC: CPT

## 2021-01-01 PROCEDURE — G8420 CALC BMI NORM PARAMETERS: HCPCS | Performed by: INTERNAL MEDICINE

## 2021-01-01 PROCEDURE — 97530 THERAPEUTIC ACTIVITIES: CPT

## 2021-01-01 PROCEDURE — 73502 X-RAY EXAM HIP UNI 2-3 VIEWS: CPT

## 2021-01-01 PROCEDURE — 82803 BLOOD GASES ANY COMBINATION: CPT

## 2021-01-01 PROCEDURE — G8427 DOCREV CUR MEDS BY ELIG CLIN: HCPCS | Performed by: INTERNAL MEDICINE

## 2021-01-01 PROCEDURE — 85027 COMPLETE CBC AUTOMATED: CPT

## 2021-01-01 PROCEDURE — 0QS906Z REPOSITION LEFT FEMORAL SHAFT WITH INTRAMEDULLARY INTERNAL FIXATION DEVICE, OPEN APPROACH: ICD-10-PCS | Performed by: ORTHOPAEDIC SURGERY

## 2021-01-01 PROCEDURE — 86850 RBC ANTIBODY SCREEN: CPT

## 2021-01-01 PROCEDURE — 83036 HEMOGLOBIN GLYCOSYLATED A1C: CPT

## 2021-01-01 PROCEDURE — 83540 ASSAY OF IRON: CPT

## 2021-01-01 PROCEDURE — 93005 ELECTROCARDIOGRAM TRACING: CPT | Performed by: NURSE PRACTITIONER

## 2021-01-01 PROCEDURE — 02PA3RZ REMOVAL OF SHORT-TERM EXTERNAL HEART ASSIST SYSTEM FROM HEART, PERCUTANEOUS APPROACH: ICD-10-PCS | Performed by: INTERNAL MEDICINE

## 2021-01-01 PROCEDURE — 6360000002 HC RX W HCPCS: Performed by: THORACIC SURGERY (CARDIOTHORACIC VASCULAR SURGERY)

## 2021-01-01 PROCEDURE — 85018 HEMOGLOBIN: CPT

## 2021-01-01 PROCEDURE — 6360000002 HC RX W HCPCS: Performed by: EMERGENCY MEDICINE

## 2021-01-01 PROCEDURE — 2580000003 HC RX 258: Performed by: NURSE ANESTHETIST, CERTIFIED REGISTERED

## 2021-01-01 PROCEDURE — 3700000001 HC ADD 15 MINUTES (ANESTHESIA): Performed by: ORTHOPAEDIC SURGERY

## 2021-01-01 PROCEDURE — C1769 GUIDE WIRE: HCPCS | Performed by: ORTHOPAEDIC SURGERY

## 2021-01-01 PROCEDURE — 99232 SBSQ HOSP IP/OBS MODERATE 35: CPT | Performed by: NURSE PRACTITIONER

## 2021-01-01 PROCEDURE — 80076 HEPATIC FUNCTION PANEL: CPT

## 2021-01-01 PROCEDURE — 33992 RMVL PERQ LEFT HEART VAD: CPT | Performed by: INTERNAL MEDICINE

## 2021-01-01 PROCEDURE — 93005 ELECTROCARDIOGRAM TRACING: CPT | Performed by: EMERGENCY MEDICINE

## 2021-01-01 PROCEDURE — 71045 X-RAY EXAM CHEST 1 VIEW: CPT

## 2021-01-01 PROCEDURE — C1769 GUIDE WIRE: HCPCS

## 2021-01-01 PROCEDURE — A9552 F18 FDG: HCPCS | Performed by: INTERNAL MEDICINE

## 2021-01-01 PROCEDURE — 86900 BLOOD TYPING SEROLOGIC ABO: CPT

## 2021-01-01 PROCEDURE — 72125 CT NECK SPINE W/O DYE: CPT

## 2021-01-01 PROCEDURE — 85610 PROTHROMBIN TIME: CPT

## 2021-01-01 PROCEDURE — U0005 INFEC AGEN DETEC AMPLI PROBE: HCPCS

## 2021-01-01 PROCEDURE — 82077 ASSAY SPEC XCP UR&BREATH IA: CPT

## 2021-01-01 PROCEDURE — 82550 ASSAY OF CK (CPK): CPT

## 2021-01-01 PROCEDURE — 97162 PT EVAL MOD COMPLEX 30 MIN: CPT

## 2021-01-01 PROCEDURE — 33990 INSJ PERQ VAD L HRT ARTERIAL: CPT

## 2021-01-01 PROCEDURE — 87186 SC STD MICRODIL/AGAR DIL: CPT

## 2021-01-01 PROCEDURE — 2580000003 HC RX 258: Performed by: NURSE PRACTITIONER

## 2021-01-01 PROCEDURE — 97535 SELF CARE MNGMENT TRAINING: CPT

## 2021-01-01 PROCEDURE — 2709999900 HC NON-CHARGEABLE SUPPLY: Performed by: INTERNAL MEDICINE

## 2021-01-01 PROCEDURE — 7100000011 HC PHASE II RECOVERY - ADDTL 15 MIN: Performed by: INTERNAL MEDICINE

## 2021-01-01 PROCEDURE — 99285 EMERGENCY DEPT VISIT HI MDM: CPT

## 2021-01-01 PROCEDURE — 73501 X-RAY EXAM HIP UNI 1 VIEW: CPT

## 2021-01-01 PROCEDURE — 82728 ASSAY OF FERRITIN: CPT

## 2021-01-01 PROCEDURE — 93306 TTE W/DOPPLER COMPLETE: CPT

## 2021-01-01 PROCEDURE — 99214 OFFICE O/P EST MOD 30 MIN: CPT | Performed by: INTERNAL MEDICINE

## 2021-01-01 PROCEDURE — 7100000010 HC PHASE II RECOVERY - FIRST 15 MIN: Performed by: INTERNAL MEDICINE

## 2021-01-01 PROCEDURE — 3700000000 HC ANESTHESIA ATTENDED CARE: Performed by: INTERNAL MEDICINE

## 2021-01-01 PROCEDURE — B4101ZZ FLUOROSCOPY OF ABDOMINAL AORTA USING LOW OSMOLAR CONTRAST: ICD-10-PCS | Performed by: INTERNAL MEDICINE

## 2021-01-01 PROCEDURE — 75625 CONTRAST EXAM ABDOMINL AORTA: CPT | Performed by: INTERNAL MEDICINE

## 2021-01-01 PROCEDURE — B2111ZZ FLUOROSCOPY OF MULTIPLE CORONARY ARTERIES USING LOW OSMOLAR CONTRAST: ICD-10-PCS | Performed by: INTERNAL MEDICINE

## 2021-01-01 PROCEDURE — 3700000000 HC ANESTHESIA ATTENDED CARE: Performed by: ORTHOPAEDIC SURGERY

## 2021-01-01 PROCEDURE — 94060 EVALUATION OF WHEEZING: CPT

## 2021-01-01 PROCEDURE — 02HA3RZ INSERTION OF SHORT-TERM EXTERNAL HEART ASSIST SYSTEM INTO HEART, PERCUTANEOUS APPROACH: ICD-10-PCS | Performed by: INTERNAL MEDICINE

## 2021-01-01 PROCEDURE — 4A023N7 MEASUREMENT OF CARDIAC SAMPLING AND PRESSURE, LEFT HEART, PERCUTANEOUS APPROACH: ICD-10-PCS | Performed by: INTERNAL MEDICINE

## 2021-01-01 PROCEDURE — 72128 CT CHEST SPINE W/O DYE: CPT

## 2021-01-01 PROCEDURE — G8926 SPIRO NO PERF OR DOC: HCPCS | Performed by: INTERNAL MEDICINE

## 2021-01-01 PROCEDURE — 33990 INSJ PERQ VAD L HRT ARTERIAL: CPT | Performed by: INTERNAL MEDICINE

## 2021-01-01 PROCEDURE — 99152 MOD SED SAME PHYS/QHP 5/>YRS: CPT | Performed by: INTERNAL MEDICINE

## 2021-01-01 PROCEDURE — 02HV33Z INSERTION OF INFUSION DEVICE INTO SUPERIOR VENA CAVA, PERCUTANEOUS APPROACH: ICD-10-PCS | Performed by: EMERGENCY MEDICINE

## 2021-01-01 PROCEDURE — 3430000000 HC RX DIAGNOSTIC RADIOPHARMACEUTICAL: Performed by: INTERNAL MEDICINE

## 2021-01-01 PROCEDURE — 93458 L HRT ARTERY/VENTRICLE ANGIO: CPT | Performed by: INTERNAL MEDICINE

## 2021-01-01 PROCEDURE — 6360000002 HC RX W HCPCS: Performed by: NURSE ANESTHETIST, CERTIFIED REGISTERED

## 2021-01-01 PROCEDURE — 3209999900 FLUORO FOR SURGICAL PROCEDURES

## 2021-01-01 PROCEDURE — 6370000000 HC RX 637 (ALT 250 FOR IP): Performed by: NURSE PRACTITIONER

## 2021-01-01 PROCEDURE — 99222 1ST HOSP IP/OBS MODERATE 55: CPT | Performed by: INTERNAL MEDICINE

## 2021-01-01 PROCEDURE — 2709999900 HC NON-CHARGEABLE SUPPLY

## 2021-01-01 PROCEDURE — 70450 CT HEAD/BRAIN W/O DYE: CPT

## 2021-01-01 PROCEDURE — 3600000004 HC SURGERY LEVEL 4 BASE: Performed by: ORTHOPAEDIC SURGERY

## 2021-01-01 PROCEDURE — C9113 INJ PANTOPRAZOLE SODIUM, VIA: HCPCS | Performed by: PHYSICIAN ASSISTANT

## 2021-01-01 PROCEDURE — 99239 HOSP IP/OBS DSCHRG MGMT >30: CPT | Performed by: INTERNAL MEDICINE

## 2021-01-01 PROCEDURE — 3023F SPIROM DOC REV: CPT | Performed by: INTERNAL MEDICINE

## 2021-01-01 PROCEDURE — 99222 1ST HOSP IP/OBS MODERATE 55: CPT | Performed by: THORACIC SURGERY (CARDIOTHORACIC VASCULAR SURGERY)

## 2021-01-01 PROCEDURE — 2720000010 HC SURG SUPPLY STERILE: Performed by: ORTHOPAEDIC SURGERY

## 2021-01-01 PROCEDURE — 3700000001 HC ADD 15 MINUTES (ANESTHESIA): Performed by: INTERNAL MEDICINE

## 2021-01-01 PROCEDURE — 71111 X-RAY EXAM RIBS/CHEST4/> VWS: CPT

## 2021-01-01 PROCEDURE — 78815 PET IMAGE W/CT SKULL-THIGH: CPT

## 2021-01-01 PROCEDURE — B2151ZZ FLUOROSCOPY OF LEFT HEART USING LOW OSMOLAR CONTRAST: ICD-10-PCS | Performed by: INTERNAL MEDICINE

## 2021-01-01 PROCEDURE — 3600000014 HC SURGERY LEVEL 4 ADDTL 15MIN: Performed by: ORTHOPAEDIC SURGERY

## 2021-01-01 PROCEDURE — 96376 TX/PRO/DX INJ SAME DRUG ADON: CPT

## 2021-01-01 PROCEDURE — 86901 BLOOD TYPING SEROLOGIC RH(D): CPT

## 2021-01-01 PROCEDURE — 83690 ASSAY OF LIPASE: CPT

## 2021-01-01 PROCEDURE — 2709999900 HC NON-CHARGEABLE SUPPLY: Performed by: ORTHOPAEDIC SURGERY

## 2021-01-01 PROCEDURE — 94618 PULMONARY STRESS TESTING: CPT

## 2021-01-01 PROCEDURE — 87077 CULTURE AEROBIC IDENTIFY: CPT

## 2021-01-01 PROCEDURE — 99223 1ST HOSP IP/OBS HIGH 75: CPT | Performed by: SURGERY

## 2021-01-01 PROCEDURE — 99222 1ST HOSP IP/OBS MODERATE 55: CPT | Performed by: PHYSICIAN ASSISTANT

## 2021-01-01 PROCEDURE — 36556 INSERT NON-TUNNEL CV CATH: CPT

## 2021-01-01 PROCEDURE — 82435 ASSAY OF BLOOD CHLORIDE: CPT

## 2021-01-01 PROCEDURE — 99284 EMERGENCY DEPT VISIT MOD MDM: CPT

## 2021-01-01 PROCEDURE — 93458 L HRT ARTERY/VENTRICLE ANGIO: CPT

## 2021-01-01 PROCEDURE — 94150 VITAL CAPACITY TEST: CPT

## 2021-01-01 PROCEDURE — 43235 EGD DIAGNOSTIC BRUSH WASH: CPT | Performed by: INTERNAL MEDICINE

## 2021-01-01 PROCEDURE — 2500000003 HC RX 250 WO HCPCS: Performed by: EMERGENCY MEDICINE

## 2021-01-01 PROCEDURE — 6370000000 HC RX 637 (ALT 250 FOR IP): Performed by: EMERGENCY MEDICINE

## 2021-01-01 DEVICE — LAG SCREW, TI
Type: IMPLANTABLE DEVICE | Site: FEMUR | Status: FUNCTIONAL
Brand: GAMMA

## 2021-01-01 DEVICE — TROCHANTERIC NAIL KIT, TI
Type: IMPLANTABLE DEVICE | Site: FEMUR | Status: FUNCTIONAL
Brand: GAMMA

## 2021-01-01 DEVICE — LOCKING SCREW
Type: IMPLANTABLE DEVICE | Site: FEMUR | Status: FUNCTIONAL
Brand: T2 ALPHA

## 2021-01-01 RX ORDER — FLUDEOXYGLUCOSE F 18 200 MCI/ML
13.71 INJECTION, SOLUTION INTRAVENOUS
Status: COMPLETED | OUTPATIENT
Start: 2021-01-01 | End: 2021-01-01

## 2021-01-01 RX ORDER — FUROSEMIDE 10 MG/ML
40 INJECTION INTRAMUSCULAR; INTRAVENOUS ONCE
Status: COMPLETED | OUTPATIENT
Start: 2021-01-01 | End: 2021-01-01

## 2021-01-01 RX ORDER — PANTOPRAZOLE SODIUM 40 MG/10ML
40 INJECTION, POWDER, LYOPHILIZED, FOR SOLUTION INTRAVENOUS DAILY
Status: DISCONTINUED | OUTPATIENT
Start: 2021-01-01 | End: 2021-01-01 | Stop reason: HOSPADM

## 2021-01-01 RX ORDER — HEPARIN SODIUM 10000 [USP'U]/100ML
1200 INJECTION, SOLUTION INTRAVENOUS CONTINUOUS
Status: DISCONTINUED | OUTPATIENT
Start: 2021-01-01 | End: 2021-01-01

## 2021-01-01 RX ORDER — SODIUM CHLORIDE 9 MG/ML
INJECTION, SOLUTION INTRAVENOUS
Status: DISCONTINUED
Start: 2021-01-01 | End: 2021-01-01

## 2021-01-01 RX ORDER — OXYCODONE HYDROCHLORIDE AND ACETAMINOPHEN 5; 325 MG/1; MG/1
1 TABLET ORAL PRN
Status: DISCONTINUED | OUTPATIENT
Start: 2021-01-01 | End: 2021-01-01

## 2021-01-01 RX ORDER — INSULIN GLARGINE 100 [IU]/ML
20 INJECTION, SOLUTION SUBCUTANEOUS NIGHTLY
Status: DISCONTINUED | OUTPATIENT
Start: 2021-01-01 | End: 2021-01-01 | Stop reason: HOSPADM

## 2021-01-01 RX ORDER — LIDOCAINE 50 MG/G
1 PATCH TOPICAL DAILY
Qty: 30 PATCH | Refills: 0 | Status: SHIPPED | OUTPATIENT
Start: 2021-01-01

## 2021-01-01 RX ORDER — LISINOPRIL 20 MG/1
20 TABLET ORAL 2 TIMES DAILY
Status: DISCONTINUED | OUTPATIENT
Start: 2021-01-01 | End: 2021-01-01 | Stop reason: HOSPADM

## 2021-01-01 RX ORDER — AMIODARONE HYDROCHLORIDE 200 MG/1
400 TABLET ORAL 2 TIMES DAILY
Status: DISCONTINUED | OUTPATIENT
Start: 2021-01-01 | End: 2021-01-01 | Stop reason: HOSPADM

## 2021-01-01 RX ORDER — MIDAZOLAM HYDROCHLORIDE 5 MG/ML
INJECTION INTRAMUSCULAR; INTRAVENOUS
Status: COMPLETED | OUTPATIENT
Start: 2021-01-01 | End: 2021-01-01

## 2021-01-01 RX ORDER — CARVEDILOL 6.25 MG/1
6.25 TABLET ORAL 2 TIMES DAILY WITH MEALS
Status: DISCONTINUED | OUTPATIENT
Start: 2021-01-01 | End: 2021-01-01 | Stop reason: HOSPADM

## 2021-01-01 RX ORDER — MORPHINE SULFATE 2 MG/ML
2 INJECTION, SOLUTION INTRAMUSCULAR; INTRAVENOUS EVERY 4 HOURS PRN
Status: DISCONTINUED | OUTPATIENT
Start: 2021-01-01 | End: 2021-01-01

## 2021-01-01 RX ORDER — ALBUTEROL SULFATE 2.5 MG/3ML
2.5 SOLUTION RESPIRATORY (INHALATION) EVERY 6 HOURS PRN
Status: DISCONTINUED | OUTPATIENT
Start: 2021-01-01 | End: 2021-01-01 | Stop reason: HOSPADM

## 2021-01-01 RX ORDER — POTASSIUM CHLORIDE 20 MEQ/1
40 TABLET, EXTENDED RELEASE ORAL ONCE
Status: COMPLETED | OUTPATIENT
Start: 2021-01-01 | End: 2021-01-01

## 2021-01-01 RX ORDER — ONDANSETRON 2 MG/ML
4 INJECTION INTRAMUSCULAR; INTRAVENOUS EVERY 6 HOURS PRN
Status: DISCONTINUED | OUTPATIENT
Start: 2021-01-01 | End: 2021-01-01 | Stop reason: HOSPADM

## 2021-01-01 RX ORDER — SODIUM CHLORIDE 9 MG/ML
INJECTION, SOLUTION INTRAVENOUS CONTINUOUS
Status: DISCONTINUED | OUTPATIENT
Start: 2021-01-01 | End: 2021-01-01

## 2021-01-01 RX ORDER — NICOTINE POLACRILEX 4 MG
15 LOZENGE BUCCAL PRN
Status: DISCONTINUED | OUTPATIENT
Start: 2021-01-01 | End: 2021-01-01 | Stop reason: HOSPADM

## 2021-01-01 RX ORDER — HEPARIN SODIUM 1000 [USP'U]/ML
2000 INJECTION, SOLUTION INTRAVENOUS; SUBCUTANEOUS PRN
Status: DISCONTINUED | OUTPATIENT
Start: 2021-01-01 | End: 2021-01-01

## 2021-01-01 RX ORDER — HEPARIN SODIUM 10000 [USP'U]/100ML
910 INJECTION, SOLUTION INTRAVENOUS CONTINUOUS
Status: DISCONTINUED | OUTPATIENT
Start: 2021-01-01 | End: 2021-01-01

## 2021-01-01 RX ORDER — ACETAMINOPHEN 650 MG/1
650 SUPPOSITORY RECTAL EVERY 6 HOURS PRN
Status: DISCONTINUED | OUTPATIENT
Start: 2021-01-01 | End: 2021-01-01 | Stop reason: HOSPADM

## 2021-01-01 RX ORDER — MAGNESIUM SULFATE IN WATER 40 MG/ML
2000 INJECTION, SOLUTION INTRAVENOUS ONCE
Status: COMPLETED | OUTPATIENT
Start: 2021-01-01 | End: 2021-01-01

## 2021-01-01 RX ORDER — ATORVASTATIN CALCIUM 80 MG/1
80 TABLET, FILM COATED ORAL NIGHTLY
Status: DISCONTINUED | OUTPATIENT
Start: 2021-01-01 | End: 2021-01-01 | Stop reason: HOSPADM

## 2021-01-01 RX ORDER — MORPHINE SULFATE 2 MG/ML
2 INJECTION, SOLUTION INTRAMUSCULAR; INTRAVENOUS EVERY 5 MIN PRN
Status: DISCONTINUED | OUTPATIENT
Start: 2021-01-01 | End: 2021-01-01 | Stop reason: HOSPADM

## 2021-01-01 RX ORDER — FENTANYL CITRATE 50 UG/ML
INJECTION, SOLUTION INTRAMUSCULAR; INTRAVENOUS PRN
Status: DISCONTINUED | OUTPATIENT
Start: 2021-01-01 | End: 2021-01-01 | Stop reason: SDUPTHER

## 2021-01-01 RX ORDER — ONDANSETRON 2 MG/ML
4 INJECTION INTRAMUSCULAR; INTRAVENOUS EVERY 30 MIN PRN
Status: DISCONTINUED | OUTPATIENT
Start: 2021-01-01 | End: 2021-01-01

## 2021-01-01 RX ORDER — MORPHINE SULFATE 2 MG/ML
2 INJECTION, SOLUTION INTRAMUSCULAR; INTRAVENOUS EVERY 30 MIN PRN
Status: DISCONTINUED | OUTPATIENT
Start: 2021-01-01 | End: 2021-01-01 | Stop reason: HOSPADM

## 2021-01-01 RX ORDER — PANTOPRAZOLE SODIUM 40 MG/1
40 TABLET, DELAYED RELEASE ORAL
Status: DISCONTINUED | OUTPATIENT
Start: 2021-01-01 | End: 2021-01-01 | Stop reason: HOSPADM

## 2021-01-01 RX ORDER — FENTANYL CITRATE 50 UG/ML
INJECTION, SOLUTION INTRAMUSCULAR; INTRAVENOUS
Status: COMPLETED | OUTPATIENT
Start: 2021-01-01 | End: 2021-01-01

## 2021-01-01 RX ORDER — ACETAMINOPHEN 650 MG/1
650 SUPPOSITORY RECTAL EVERY 6 HOURS PRN
Status: DISCONTINUED | OUTPATIENT
Start: 2021-01-01 | End: 2021-01-01

## 2021-01-01 RX ORDER — SODIUM CHLORIDE 0.9 % (FLUSH) 0.9 %
10 SYRINGE (ML) INJECTION EVERY 12 HOURS SCHEDULED
Status: DISCONTINUED | OUTPATIENT
Start: 2021-01-01 | End: 2021-01-01 | Stop reason: HOSPADM

## 2021-01-01 RX ORDER — SODIUM CHLORIDE 0.9 % (FLUSH) 0.9 %
10 SYRINGE (ML) INJECTION EVERY 12 HOURS SCHEDULED
Status: DISCONTINUED | OUTPATIENT
Start: 2021-01-01 | End: 2021-01-01

## 2021-01-01 RX ORDER — LABETALOL HYDROCHLORIDE 5 MG/ML
5 INJECTION, SOLUTION INTRAVENOUS
Status: DISCONTINUED | OUTPATIENT
Start: 2021-01-01 | End: 2021-01-01

## 2021-01-01 RX ORDER — HEPARIN SODIUM 1000 [USP'U]/ML
4000 INJECTION, SOLUTION INTRAVENOUS; SUBCUTANEOUS PRN
Status: DISCONTINUED | OUTPATIENT
Start: 2021-01-01 | End: 2021-01-01

## 2021-01-01 RX ORDER — POTASSIUM CHLORIDE 20 MEQ/1
40 TABLET, EXTENDED RELEASE ORAL
Status: COMPLETED | OUTPATIENT
Start: 2021-01-01 | End: 2021-01-01

## 2021-01-01 RX ORDER — SODIUM CHLORIDE, SODIUM LACTATE, POTASSIUM CHLORIDE, CALCIUM CHLORIDE 600; 310; 30; 20 MG/100ML; MG/100ML; MG/100ML; MG/100ML
INJECTION, SOLUTION INTRAVENOUS CONTINUOUS
Status: DISCONTINUED | OUTPATIENT
Start: 2021-01-01 | End: 2021-01-01

## 2021-01-01 RX ORDER — MORPHINE SULFATE 2 MG/ML
2 INJECTION, SOLUTION INTRAMUSCULAR; INTRAVENOUS
Status: DISCONTINUED | OUTPATIENT
Start: 2021-01-01 | End: 2021-01-01 | Stop reason: HOSPADM

## 2021-01-01 RX ORDER — POTASSIUM CHLORIDE 29.8 MG/ML
20 INJECTION INTRAVENOUS PRN
Status: DISCONTINUED | OUTPATIENT
Start: 2021-01-01 | End: 2021-01-01 | Stop reason: HOSPADM

## 2021-01-01 RX ORDER — SODIUM CHLORIDE 0.9 % (FLUSH) 0.9 %
10 SYRINGE (ML) INJECTION EVERY 12 HOURS SCHEDULED
Status: CANCELLED | OUTPATIENT
Start: 2021-01-01

## 2021-01-01 RX ORDER — TROSPIUM CHLORIDE 20 MG/1
20 TABLET, FILM COATED ORAL
Status: DISCONTINUED | OUTPATIENT
Start: 2021-01-01 | End: 2021-01-01 | Stop reason: HOSPADM

## 2021-01-01 RX ORDER — NICOTINE POLACRILEX 4 MG
15 LOZENGE BUCCAL PRN
Status: DISCONTINUED | OUTPATIENT
Start: 2021-01-01 | End: 2021-01-01

## 2021-01-01 RX ORDER — DEXTROSE MONOHYDRATE 25 G/50ML
12.5 INJECTION, SOLUTION INTRAVENOUS PRN
Status: DISCONTINUED | OUTPATIENT
Start: 2021-01-01 | End: 2021-01-01 | Stop reason: HOSPADM

## 2021-01-01 RX ORDER — OXYCODONE HYDROCHLORIDE 5 MG/1
5 TABLET ORAL EVERY 4 HOURS PRN
Status: DISCONTINUED | OUTPATIENT
Start: 2021-01-01 | End: 2021-01-01 | Stop reason: HOSPADM

## 2021-01-01 RX ORDER — CEPHALEXIN 500 MG/1
500 CAPSULE ORAL 4 TIMES DAILY
Qty: 28 CAPSULE | Refills: 0 | Status: SHIPPED | OUTPATIENT
Start: 2021-01-01 | End: 2021-01-01

## 2021-01-01 RX ORDER — SODIUM CHLORIDE 0.9 % (FLUSH) 0.9 %
10 SYRINGE (ML) INJECTION PRN
Status: DISCONTINUED | OUTPATIENT
Start: 2021-01-01 | End: 2021-01-01 | Stop reason: HOSPADM

## 2021-01-01 RX ORDER — PROPOFOL 10 MG/ML
INJECTION, EMULSION INTRAVENOUS PRN
Status: DISCONTINUED | OUTPATIENT
Start: 2021-01-01 | End: 2021-01-01 | Stop reason: SDUPTHER

## 2021-01-01 RX ORDER — ASPIRIN 81 MG/1
81 TABLET, CHEWABLE ORAL DAILY
Status: DISCONTINUED | OUTPATIENT
Start: 2021-01-01 | End: 2021-01-01 | Stop reason: HOSPADM

## 2021-01-01 RX ORDER — CARVEDILOL 3.12 MG/1
3.12 TABLET ORAL 2 TIMES DAILY WITH MEALS
Status: DISCONTINUED | OUTPATIENT
Start: 2021-01-01 | End: 2021-01-01

## 2021-01-01 RX ORDER — BUDESONIDE AND FORMOTEROL FUMARATE DIHYDRATE 160; 4.5 UG/1; UG/1
2 AEROSOL RESPIRATORY (INHALATION) 2 TIMES DAILY
Status: DISCONTINUED | OUTPATIENT
Start: 2021-01-01 | End: 2021-01-01

## 2021-01-01 RX ORDER — METOPROLOL TARTRATE 5 MG/5ML
INJECTION INTRAVENOUS
Status: COMPLETED | OUTPATIENT
Start: 2021-01-01 | End: 2021-01-01

## 2021-01-01 RX ORDER — ASPIRIN 300 MG/1
300 SUPPOSITORY RECTAL ONCE
Status: DISCONTINUED | OUTPATIENT
Start: 2021-01-01 | End: 2021-01-01

## 2021-01-01 RX ORDER — ATORVASTATIN CALCIUM 40 MG/1
80 TABLET, FILM COATED ORAL DAILY
Status: DISCONTINUED | OUTPATIENT
Start: 2021-01-01 | End: 2021-01-01 | Stop reason: HOSPADM

## 2021-01-01 RX ORDER — DILTIAZEM HYDROCHLORIDE 5 MG/ML
INJECTION INTRAVENOUS
Status: DISPENSED
Start: 2021-01-01 | End: 2021-01-01

## 2021-01-01 RX ORDER — OXYCODONE HYDROCHLORIDE AND ACETAMINOPHEN 5; 325 MG/1; MG/1
2 TABLET ORAL PRN
Status: DISCONTINUED | OUTPATIENT
Start: 2021-01-01 | End: 2021-01-01

## 2021-01-01 RX ORDER — 0.9 % SODIUM CHLORIDE 0.9 %
1000 INTRAVENOUS SOLUTION INTRAVENOUS ONCE
Status: COMPLETED | OUTPATIENT
Start: 2021-01-01 | End: 2021-01-01

## 2021-01-01 RX ORDER — FUROSEMIDE 40 MG/1
40 TABLET ORAL DAILY
Status: DISCONTINUED | OUTPATIENT
Start: 2021-01-01 | End: 2021-01-01

## 2021-01-01 RX ORDER — CEPHALEXIN 500 MG/1
500 CAPSULE ORAL ONCE
Status: COMPLETED | OUTPATIENT
Start: 2021-01-01 | End: 2021-01-01

## 2021-01-01 RX ORDER — PROMETHAZINE HYDROCHLORIDE 25 MG/1
12.5 TABLET ORAL EVERY 6 HOURS PRN
Status: DISCONTINUED | OUTPATIENT
Start: 2021-01-01 | End: 2021-01-01 | Stop reason: HOSPADM

## 2021-01-01 RX ORDER — DIPHENHYDRAMINE HYDROCHLORIDE 50 MG/ML
6.25 INJECTION INTRAMUSCULAR; INTRAVENOUS
Status: DISCONTINUED | OUTPATIENT
Start: 2021-01-01 | End: 2021-01-01

## 2021-01-01 RX ORDER — DILTIAZEM HYDROCHLORIDE 5 MG/ML
10 INJECTION INTRAVENOUS ONCE
Status: COMPLETED | OUTPATIENT
Start: 2021-01-01 | End: 2021-01-01

## 2021-01-01 RX ORDER — POTASSIUM CHLORIDE 20 MEQ/1
20 TABLET, EXTENDED RELEASE ORAL DAILY
Qty: 7 TABLET | Refills: 0
Start: 2021-01-01 | End: 2021-01-01

## 2021-01-01 RX ORDER — DEXTROSE MONOHYDRATE 50 MG/ML
100 INJECTION, SOLUTION INTRAVENOUS PRN
Status: DISCONTINUED | OUTPATIENT
Start: 2021-01-01 | End: 2021-01-01

## 2021-01-01 RX ORDER — OXYCODONE HYDROCHLORIDE 5 MG/1
10 TABLET ORAL EVERY 4 HOURS PRN
Status: DISCONTINUED | OUTPATIENT
Start: 2021-01-01 | End: 2021-01-01 | Stop reason: HOSPADM

## 2021-01-01 RX ORDER — HEPARIN SODIUM 1000 [USP'U]/ML
INJECTION, SOLUTION INTRAVENOUS; SUBCUTANEOUS
Status: COMPLETED | OUTPATIENT
Start: 2021-01-01 | End: 2021-01-01

## 2021-01-01 RX ORDER — ONDANSETRON 2 MG/ML
4 INJECTION INTRAMUSCULAR; INTRAVENOUS EVERY 6 HOURS PRN
Status: DISCONTINUED | OUTPATIENT
Start: 2021-01-01 | End: 2021-01-01

## 2021-01-01 RX ORDER — PROMETHAZINE HYDROCHLORIDE 25 MG/1
12.5 TABLET ORAL EVERY 6 HOURS PRN
Status: DISCONTINUED | OUTPATIENT
Start: 2021-01-01 | End: 2021-01-01

## 2021-01-01 RX ORDER — BUDESONIDE AND FORMOTEROL FUMARATE DIHYDRATE 80; 4.5 UG/1; UG/1
2 AEROSOL RESPIRATORY (INHALATION) 2 TIMES DAILY
Status: DISCONTINUED | OUTPATIENT
Start: 2021-01-01 | End: 2021-01-01 | Stop reason: HOSPADM

## 2021-01-01 RX ORDER — LORAZEPAM 2 MG/ML
1 INJECTION INTRAMUSCULAR
Status: DISCONTINUED | OUTPATIENT
Start: 2021-01-01 | End: 2021-01-01 | Stop reason: HOSPADM

## 2021-01-01 RX ORDER — FUROSEMIDE 20 MG/1
20 TABLET ORAL DAILY
Status: DISCONTINUED | OUTPATIENT
Start: 2021-01-01 | End: 2021-01-01 | Stop reason: HOSPADM

## 2021-01-01 RX ORDER — OXYCODONE HYDROCHLORIDE AND ACETAMINOPHEN 5; 325 MG/1; MG/1
2 TABLET ORAL PRN
Status: DISCONTINUED | OUTPATIENT
Start: 2021-01-01 | End: 2021-01-01 | Stop reason: HOSPADM

## 2021-01-01 RX ORDER — ONDANSETRON 2 MG/ML
4 INJECTION INTRAMUSCULAR; INTRAVENOUS
Status: DISCONTINUED | OUTPATIENT
Start: 2021-01-01 | End: 2021-01-01 | Stop reason: HOSPADM

## 2021-01-01 RX ORDER — POTASSIUM CHLORIDE 20 MEQ/1
20 TABLET, EXTENDED RELEASE ORAL DAILY
Status: DISCONTINUED | OUTPATIENT
Start: 2021-01-01 | End: 2021-01-01 | Stop reason: HOSPADM

## 2021-01-01 RX ORDER — LIDOCAINE HYDROCHLORIDE 20 MG/ML
INJECTION, SOLUTION EPIDURAL; INFILTRATION; INTRACAUDAL; PERINEURAL PRN
Status: DISCONTINUED | OUTPATIENT
Start: 2021-01-01 | End: 2021-01-01 | Stop reason: SDUPTHER

## 2021-01-01 RX ORDER — ACETAMINOPHEN 325 MG/1
650 TABLET ORAL EVERY 6 HOURS PRN
Status: DISCONTINUED | OUTPATIENT
Start: 2021-01-01 | End: 2021-01-01

## 2021-01-01 RX ORDER — OXYCODONE HYDROCHLORIDE AND ACETAMINOPHEN 5; 325 MG/1; MG/1
1 TABLET ORAL PRN
Status: DISCONTINUED | OUTPATIENT
Start: 2021-01-01 | End: 2021-01-01 | Stop reason: HOSPADM

## 2021-01-01 RX ORDER — AMLODIPINE BESYLATE 5 MG/1
5 TABLET ORAL ONCE
Status: COMPLETED | OUTPATIENT
Start: 2021-01-01 | End: 2021-01-01

## 2021-01-01 RX ORDER — HYDRALAZINE HYDROCHLORIDE 20 MG/ML
5 INJECTION INTRAMUSCULAR; INTRAVENOUS EVERY 30 MIN PRN
Status: DISCONTINUED | OUTPATIENT
Start: 2021-01-01 | End: 2021-01-01

## 2021-01-01 RX ORDER — EPTIFIBATIDE 0.75 MG/ML
1 INJECTION, SOLUTION INTRAVENOUS CONTINUOUS
Status: DISCONTINUED | OUTPATIENT
Start: 2021-01-01 | End: 2021-01-01

## 2021-01-01 RX ORDER — BUDESONIDE AND FORMOTEROL FUMARATE DIHYDRATE 160; 4.5 UG/1; UG/1
2 AEROSOL RESPIRATORY (INHALATION) 2 TIMES DAILY
Status: DISCONTINUED | OUTPATIENT
Start: 2021-01-01 | End: 2021-01-01 | Stop reason: HOSPADM

## 2021-01-01 RX ORDER — MORPHINE SULFATE 2 MG/ML
1 INJECTION, SOLUTION INTRAMUSCULAR; INTRAVENOUS EVERY 5 MIN PRN
Status: DISCONTINUED | OUTPATIENT
Start: 2021-01-01 | End: 2021-01-01 | Stop reason: HOSPADM

## 2021-01-01 RX ORDER — LISINOPRIL 10 MG/1
10 TABLET ORAL 2 TIMES DAILY
Status: DISCONTINUED | OUTPATIENT
Start: 2021-01-01 | End: 2021-01-01

## 2021-01-01 RX ORDER — ALBUTEROL SULFATE 90 UG/1
4 AEROSOL, METERED RESPIRATORY (INHALATION) ONCE
Status: COMPLETED | OUTPATIENT
Start: 2021-01-01 | End: 2021-01-01

## 2021-01-01 RX ORDER — CARVEDILOL 6.25 MG/1
6.25 TABLET ORAL 2 TIMES DAILY
Status: DISCONTINUED | OUTPATIENT
Start: 2021-01-01 | End: 2021-01-01 | Stop reason: HOSPADM

## 2021-01-01 RX ORDER — SODIUM CHLORIDE, SODIUM LACTATE, POTASSIUM CHLORIDE, CALCIUM CHLORIDE 600; 310; 30; 20 MG/100ML; MG/100ML; MG/100ML; MG/100ML
INJECTION, SOLUTION INTRAVENOUS CONTINUOUS
Status: CANCELLED | OUTPATIENT
Start: 2021-01-01

## 2021-01-01 RX ORDER — ACETAMINOPHEN 325 MG/1
650 TABLET ORAL EVERY 6 HOURS
Status: DISCONTINUED | OUTPATIENT
Start: 2021-01-01 | End: 2021-01-01 | Stop reason: HOSPADM

## 2021-01-01 RX ORDER — BUDESONIDE AND FORMOTEROL FUMARATE DIHYDRATE 160; 4.5 UG/1; UG/1
2 AEROSOL RESPIRATORY (INHALATION) PRN
Status: DISCONTINUED | OUTPATIENT
Start: 2021-01-01 | End: 2021-01-01 | Stop reason: HOSPADM

## 2021-01-01 RX ORDER — IPRATROPIUM BROMIDE AND ALBUTEROL SULFATE 2.5; .5 MG/3ML; MG/3ML
1 SOLUTION RESPIRATORY (INHALATION) ONCE
Status: COMPLETED | OUTPATIENT
Start: 2021-01-01 | End: 2021-01-01

## 2021-01-01 RX ORDER — MEPERIDINE HYDROCHLORIDE 50 MG/ML
12.5 INJECTION INTRAMUSCULAR; INTRAVENOUS; SUBCUTANEOUS EVERY 5 MIN PRN
Status: DISCONTINUED | OUTPATIENT
Start: 2021-01-01 | End: 2021-01-01 | Stop reason: HOSPADM

## 2021-01-01 RX ORDER — IPRATROPIUM BROMIDE AND ALBUTEROL SULFATE 2.5; .5 MG/3ML; MG/3ML
3 SOLUTION RESPIRATORY (INHALATION) 3 TIMES DAILY
Status: DISCONTINUED | OUTPATIENT
Start: 2021-01-01 | End: 2021-01-01 | Stop reason: HOSPADM

## 2021-01-01 RX ORDER — DIGOXIN 0.25 MG/ML
INJECTION INTRAMUSCULAR; INTRAVENOUS
Status: COMPLETED | OUTPATIENT
Start: 2021-01-01 | End: 2021-01-01

## 2021-01-01 RX ORDER — CLOPIDOGREL BISULFATE 75 MG/1
75 TABLET ORAL DAILY
Status: DISCONTINUED | OUTPATIENT
Start: 2021-01-01 | End: 2021-01-01 | Stop reason: HOSPADM

## 2021-01-01 RX ORDER — SODIUM CHLORIDE 9 MG/ML
10 INJECTION INTRAVENOUS DAILY
Status: DISCONTINUED | OUTPATIENT
Start: 2021-01-01 | End: 2021-01-01 | Stop reason: HOSPADM

## 2021-01-01 RX ORDER — HEPARIN SODIUM 10000 [USP'U]/100ML
910 INJECTION, SOLUTION INTRAVENOUS CONTINUOUS
Status: DISCONTINUED | OUTPATIENT
Start: 2021-01-01 | End: 2021-01-01 | Stop reason: SDUPTHER

## 2021-01-01 RX ORDER — DEXTROSE MONOHYDRATE 50 MG/ML
100 INJECTION, SOLUTION INTRAVENOUS PRN
Status: DISCONTINUED | OUTPATIENT
Start: 2021-01-01 | End: 2021-01-01 | Stop reason: HOSPADM

## 2021-01-01 RX ORDER — ACETAMINOPHEN 325 MG/1
650 TABLET ORAL EVERY 6 HOURS PRN
Status: DISCONTINUED | OUTPATIENT
Start: 2021-01-01 | End: 2021-01-01 | Stop reason: HOSPADM

## 2021-01-01 RX ORDER — SENNA AND DOCUSATE SODIUM 50; 8.6 MG/1; MG/1
1 TABLET, FILM COATED ORAL 2 TIMES DAILY
Status: DISCONTINUED | OUTPATIENT
Start: 2021-01-01 | End: 2021-01-01 | Stop reason: HOSPADM

## 2021-01-01 RX ORDER — ONDANSETRON 2 MG/ML
INJECTION INTRAMUSCULAR; INTRAVENOUS PRN
Status: DISCONTINUED | OUTPATIENT
Start: 2021-01-01 | End: 2021-01-01 | Stop reason: SDUPTHER

## 2021-01-01 RX ORDER — MORPHINE SULFATE 2 MG/ML
INJECTION, SOLUTION INTRAMUSCULAR; INTRAVENOUS
Status: DISCONTINUED
Start: 2021-01-01 | End: 2021-01-01

## 2021-01-01 RX ORDER — FENTANYL CITRATE 50 UG/ML
50 INJECTION, SOLUTION INTRAMUSCULAR; INTRAVENOUS ONCE
Status: DISCONTINUED | OUTPATIENT
Start: 2021-01-01 | End: 2021-01-01 | Stop reason: HOSPADM

## 2021-01-01 RX ORDER — ASPIRIN 81 MG/1
81 TABLET ORAL DAILY
Status: DISCONTINUED | OUTPATIENT
Start: 2021-01-01 | End: 2021-01-01 | Stop reason: HOSPADM

## 2021-01-01 RX ORDER — PANTOPRAZOLE SODIUM 40 MG/10ML
40 INJECTION, POWDER, LYOPHILIZED, FOR SOLUTION INTRAVENOUS DAILY
Status: DISCONTINUED | OUTPATIENT
Start: 2021-01-01 | End: 2021-01-01

## 2021-01-01 RX ORDER — TROSPIUM CHLORIDE 20 MG/1
20 TABLET, FILM COATED ORAL NIGHTLY
Status: DISCONTINUED | OUTPATIENT
Start: 2021-01-01 | End: 2021-01-01 | Stop reason: HOSPADM

## 2021-01-01 RX ORDER — LISINOPRIL 20 MG/1
40 TABLET ORAL DAILY
Status: DISCONTINUED | OUTPATIENT
Start: 2021-01-01 | End: 2021-01-01 | Stop reason: HOSPADM

## 2021-01-01 RX ORDER — LIDOCAINE HYDROCHLORIDE 20 MG/ML
INJECTION, SOLUTION INFILTRATION; PERINEURAL PRN
Status: DISCONTINUED | OUTPATIENT
Start: 2021-01-01 | End: 2021-01-01 | Stop reason: SDUPTHER

## 2021-01-01 RX ORDER — HEPARIN SODIUM 1000 [USP'U]/ML
4000 INJECTION, SOLUTION INTRAVENOUS; SUBCUTANEOUS ONCE
Status: COMPLETED | OUTPATIENT
Start: 2021-01-01 | End: 2021-01-01

## 2021-01-01 RX ORDER — SODIUM CHLORIDE 0.9 % (FLUSH) 0.9 %
10 SYRINGE (ML) INJECTION PRN
Status: DISCONTINUED | OUTPATIENT
Start: 2021-01-01 | End: 2021-01-01

## 2021-01-01 RX ORDER — LIDOCAINE 4 G/G
1 PATCH TOPICAL ONCE
Status: DISCONTINUED | OUTPATIENT
Start: 2021-01-01 | End: 2021-01-01 | Stop reason: HOSPADM

## 2021-01-01 RX ORDER — AMLODIPINE BESYLATE 5 MG/1
5 TABLET ORAL NIGHTLY
Status: DISCONTINUED | OUTPATIENT
Start: 2021-01-01 | End: 2021-01-01 | Stop reason: HOSPADM

## 2021-01-01 RX ORDER — POLYETHYLENE GLYCOL 3350 17 G/17G
17 POWDER, FOR SOLUTION ORAL DAILY
Status: DISCONTINUED | OUTPATIENT
Start: 2021-01-01 | End: 2021-01-01 | Stop reason: HOSPADM

## 2021-01-01 RX ORDER — MORPHINE SULFATE 2 MG/ML
2 INJECTION, SOLUTION INTRAMUSCULAR; INTRAVENOUS
Status: DISCONTINUED | OUTPATIENT
Start: 2021-01-01 | End: 2021-01-01

## 2021-01-01 RX ORDER — OXYCODONE HYDROCHLORIDE 5 MG/1
5 TABLET ORAL EVERY 6 HOURS PRN
Qty: 28 TABLET | Refills: 0 | Status: SHIPPED | OUTPATIENT
Start: 2021-01-01 | End: 2021-01-01

## 2021-01-01 RX ORDER — POTASSIUM CHLORIDE 750 MG/1
10 TABLET, EXTENDED RELEASE ORAL ONCE
Status: COMPLETED | OUTPATIENT
Start: 2021-01-01 | End: 2021-01-01

## 2021-01-01 RX ORDER — SODIUM CHLORIDE 0.9 % (FLUSH) 0.9 %
10 SYRINGE (ML) INJECTION PRN
Status: CANCELLED | OUTPATIENT
Start: 2021-01-01

## 2021-01-01 RX ORDER — DEXTROSE MONOHYDRATE 25 G/50ML
12.5 INJECTION, SOLUTION INTRAVENOUS PRN
Status: DISCONTINUED | OUTPATIENT
Start: 2021-01-01 | End: 2021-01-01

## 2021-01-01 RX ORDER — POLYETHYLENE GLYCOL 3350 17 G/17G
17 POWDER, FOR SOLUTION ORAL DAILY PRN
Status: DISCONTINUED | OUTPATIENT
Start: 2021-01-01 | End: 2021-01-01

## 2021-01-01 RX ORDER — MEPERIDINE HYDROCHLORIDE 25 MG/ML
12.5 INJECTION INTRAMUSCULAR; INTRAVENOUS; SUBCUTANEOUS EVERY 5 MIN PRN
Status: DISCONTINUED | OUTPATIENT
Start: 2021-01-01 | End: 2021-01-01

## 2021-01-01 RX ORDER — MORPHINE SULFATE 4 MG/ML
4 INJECTION, SOLUTION INTRAMUSCULAR; INTRAVENOUS
Status: DISCONTINUED | OUTPATIENT
Start: 2021-01-01 | End: 2021-01-01 | Stop reason: HOSPADM

## 2021-01-01 RX ORDER — SODIUM CHLORIDE, SODIUM LACTATE, POTASSIUM CHLORIDE, CALCIUM CHLORIDE 600; 310; 30; 20 MG/100ML; MG/100ML; MG/100ML; MG/100ML
INJECTION, SOLUTION INTRAVENOUS CONTINUOUS PRN
Status: DISCONTINUED | OUTPATIENT
Start: 2021-01-01 | End: 2021-01-01 | Stop reason: SDUPTHER

## 2021-01-01 RX ORDER — ROCURONIUM BROMIDE 10 MG/ML
INJECTION, SOLUTION INTRAVENOUS PRN
Status: DISCONTINUED | OUTPATIENT
Start: 2021-01-01 | End: 2021-01-01 | Stop reason: SDUPTHER

## 2021-01-01 RX ORDER — DEXAMETHASONE SODIUM PHOSPHATE 4 MG/ML
INJECTION, SOLUTION INTRA-ARTICULAR; INTRALESIONAL; INTRAMUSCULAR; INTRAVENOUS; SOFT TISSUE PRN
Status: DISCONTINUED | OUTPATIENT
Start: 2021-01-01 | End: 2021-01-01 | Stop reason: SDUPTHER

## 2021-01-01 RX ADMIN — ASPIRIN 81 MG: 81 TABLET, CHEWABLE ORAL at 08:35

## 2021-01-01 RX ADMIN — Medication 10 ML: at 21:29

## 2021-01-01 RX ADMIN — FUROSEMIDE 20 MG: 20 TABLET ORAL at 08:01

## 2021-01-01 RX ADMIN — INSULIN LISPRO 1 UNITS: 100 INJECTION, SOLUTION INTRAVENOUS; SUBCUTANEOUS at 17:21

## 2021-01-01 RX ADMIN — MAGNESIUM GLUCONATE 500 MG ORAL TABLET 400 MG: 500 TABLET ORAL at 08:01

## 2021-01-01 RX ADMIN — DEXAMETHASONE SODIUM PHOSPHATE 8 MG: 4 INJECTION, SOLUTION INTRAMUSCULAR; INTRAVENOUS at 14:39

## 2021-01-01 RX ADMIN — IPRATROPIUM BROMIDE AND ALBUTEROL SULFATE 3 ML: .5; 3 SOLUTION RESPIRATORY (INHALATION) at 18:38

## 2021-01-01 RX ADMIN — POTASSIUM CHLORIDE 20 MEQ: 1500 TABLET, EXTENDED RELEASE ORAL at 12:13

## 2021-01-01 RX ADMIN — CARVEDILOL 6.25 MG: 6.25 TABLET, FILM COATED ORAL at 10:03

## 2021-01-01 RX ADMIN — ROCURONIUM BROMIDE 40 MG: 10 INJECTION, SOLUTION INTRAVENOUS at 13:39

## 2021-01-01 RX ADMIN — LISINOPRIL 10 MG: 10 TABLET ORAL at 15:12

## 2021-01-01 RX ADMIN — ASPIRIN 81 MG: 81 TABLET, COATED ORAL at 12:13

## 2021-01-01 RX ADMIN — TROSPIUM CHLORIDE 20 MG: 20 TABLET, FILM COATED ORAL at 21:29

## 2021-01-01 RX ADMIN — TROSPIUM CHLORIDE 20 MG: 20 TABLET, FILM COATED ORAL at 18:13

## 2021-01-01 RX ADMIN — PANTOPRAZOLE SODIUM 40 MG: 40 TABLET, DELAYED RELEASE ORAL at 05:54

## 2021-01-01 RX ADMIN — Medication 2 PUFF: at 06:47

## 2021-01-01 RX ADMIN — IPRATROPIUM BROMIDE AND ALBUTEROL SULFATE 3 ML: .5; 3 SOLUTION RESPIRATORY (INHALATION) at 19:17

## 2021-01-01 RX ADMIN — ATORVASTATIN CALCIUM 80 MG: 40 TABLET, FILM COATED ORAL at 21:13

## 2021-01-01 RX ADMIN — Medication 10 ML: at 08:36

## 2021-01-01 RX ADMIN — BISACODYL 5 MG: 5 TABLET, COATED ORAL at 16:49

## 2021-01-01 RX ADMIN — Medication 10 ML: at 21:30

## 2021-01-01 RX ADMIN — SODIUM CHLORIDE 1000 ML: 9 INJECTION, SOLUTION INTRAVENOUS at 16:12

## 2021-01-01 RX ADMIN — ASPIRIN 81 MG: 81 TABLET, CHEWABLE ORAL at 08:01

## 2021-01-01 RX ADMIN — CARVEDILOL 3.12 MG: 3.12 TABLET, FILM COATED ORAL at 18:39

## 2021-01-01 RX ADMIN — Medication 2 PUFF: at 06:55

## 2021-01-01 RX ADMIN — DOCUSATE SODIUM 50 MG AND SENNOSIDES 8.6 MG 1 TABLET: 8.6; 5 TABLET, FILM COATED ORAL at 08:01

## 2021-01-01 RX ADMIN — ACETAMINOPHEN 650 MG: 325 TABLET ORAL at 16:49

## 2021-01-01 RX ADMIN — CEPHALEXIN 500 MG: 500 CAPSULE ORAL at 18:10

## 2021-01-01 RX ADMIN — FUROSEMIDE 20 MG: 20 TABLET ORAL at 10:03

## 2021-01-01 RX ADMIN — IPRATROPIUM BROMIDE AND ALBUTEROL SULFATE 1 AMPULE: .5; 3 SOLUTION RESPIRATORY (INHALATION) at 16:11

## 2021-01-01 RX ADMIN — PANTOPRAZOLE SODIUM 40 MG: 40 TABLET, DELAYED RELEASE ORAL at 06:26

## 2021-01-01 RX ADMIN — ACETAMINOPHEN 650 MG: 325 TABLET ORAL at 20:39

## 2021-01-01 RX ADMIN — FENTANYL CITRATE 50 MCG: 50 INJECTION INTRAMUSCULAR; INTRAVENOUS at 13:39

## 2021-01-01 RX ADMIN — ACETAMINOPHEN 650 MG: 325 TABLET ORAL at 05:31

## 2021-01-01 RX ADMIN — SODIUM CHLORIDE, POTASSIUM CHLORIDE, SODIUM LACTATE AND CALCIUM CHLORIDE: 600; 310; 30; 20 INJECTION, SOLUTION INTRAVENOUS at 12:50

## 2021-01-01 RX ADMIN — LIDOCAINE HYDROCHLORIDE 50 MG: 20 INJECTION, SOLUTION EPIDURAL; INFILTRATION; INTRACAUDAL; PERINEURAL at 13:39

## 2021-01-01 RX ADMIN — IPRATROPIUM BROMIDE AND ALBUTEROL SULFATE 3 ML: .5; 3 SOLUTION RESPIRATORY (INHALATION) at 07:36

## 2021-01-01 RX ADMIN — ACETAMINOPHEN 650 MG: 325 TABLET ORAL at 22:16

## 2021-01-01 RX ADMIN — PANTOPRAZOLE SODIUM 40 MG: 40 TABLET, DELAYED RELEASE ORAL at 06:14

## 2021-01-01 RX ADMIN — INSULIN LISPRO 1 UNITS: 100 INJECTION, SOLUTION INTRAVENOUS; SUBCUTANEOUS at 08:28

## 2021-01-01 RX ADMIN — POTASSIUM CHLORIDE 40 MEQ: 1500 TABLET, EXTENDED RELEASE ORAL at 13:09

## 2021-01-01 RX ADMIN — IPRATROPIUM BROMIDE AND ALBUTEROL SULFATE 3 ML: .5; 3 SOLUTION RESPIRATORY (INHALATION) at 15:10

## 2021-01-01 RX ADMIN — ASPIRIN 81 MG: 81 TABLET, COATED ORAL at 09:44

## 2021-01-01 RX ADMIN — MORPHINE SULFATE 2 MG: 2 INJECTION, SOLUTION INTRAMUSCULAR; INTRAVENOUS at 10:55

## 2021-01-01 RX ADMIN — ACETAMINOPHEN 650 MG: 325 TABLET ORAL at 10:02

## 2021-01-01 RX ADMIN — HEPARIN SODIUM 5000 UNITS: 1000 INJECTION, SOLUTION INTRAVENOUS; SUBCUTANEOUS at 02:27

## 2021-01-01 RX ADMIN — ACETAMINOPHEN 650 MG: 325 TABLET ORAL at 05:03

## 2021-01-01 RX ADMIN — POTASSIUM CHLORIDE 40 MEQ: 1500 TABLET, EXTENDED RELEASE ORAL at 09:18

## 2021-01-01 RX ADMIN — ACETAMINOPHEN 650 MG: 325 TABLET ORAL at 22:33

## 2021-01-01 RX ADMIN — Medication 10 ML: at 21:26

## 2021-01-01 RX ADMIN — Medication 10 ML: at 21:11

## 2021-01-01 RX ADMIN — MAGNESIUM SULFATE HEPTAHYDRATE 2000 MG: 40 INJECTION, SOLUTION INTRAVENOUS at 09:19

## 2021-01-01 RX ADMIN — CARVEDILOL 6.25 MG: 6.25 TABLET, FILM COATED ORAL at 21:23

## 2021-01-01 RX ADMIN — INSULIN LISPRO 1 UNITS: 100 INJECTION, SOLUTION INTRAVENOUS; SUBCUTANEOUS at 12:15

## 2021-01-01 RX ADMIN — POTASSIUM CHLORIDE 20 MEQ: 29.8 INJECTION, SOLUTION INTRAVENOUS at 11:22

## 2021-01-01 RX ADMIN — PANTOPRAZOLE SODIUM 40 MG: 40 TABLET, DELAYED RELEASE ORAL at 06:12

## 2021-01-01 RX ADMIN — OXYCODONE 10 MG: 5 TABLET ORAL at 20:38

## 2021-01-01 RX ADMIN — IPRATROPIUM BROMIDE AND ALBUTEROL SULFATE 3 ML: .5; 3 SOLUTION RESPIRATORY (INHALATION) at 19:13

## 2021-01-01 RX ADMIN — IPRATROPIUM BROMIDE AND ALBUTEROL SULFATE 3 ML: .5; 3 SOLUTION RESPIRATORY (INHALATION) at 06:51

## 2021-01-01 RX ADMIN — MORPHINE SULFATE 2 MG: 2 INJECTION, SOLUTION INTRAMUSCULAR; INTRAVENOUS at 07:41

## 2021-01-01 RX ADMIN — AMIODARONE HYDROCHLORIDE 400 MG: 200 TABLET ORAL at 12:13

## 2021-01-01 RX ADMIN — LISINOPRIL 40 MG: 20 TABLET ORAL at 08:35

## 2021-01-01 RX ADMIN — AMLODIPINE BESYLATE 5 MG: 5 TABLET ORAL at 21:13

## 2021-01-01 RX ADMIN — HEPARIN SODIUM 910 UNITS/HR: 10000 INJECTION, SOLUTION INTRAVENOUS at 11:32

## 2021-01-01 RX ADMIN — TICAGRELOR 180 MG: 90 TABLET ORAL at 01:07

## 2021-01-01 RX ADMIN — Medication 10 ML: at 20:39

## 2021-01-01 RX ADMIN — ATORVASTATIN CALCIUM 80 MG: 80 TABLET, FILM COATED ORAL at 22:07

## 2021-01-01 RX ADMIN — LIDOCAINE HYDROCHLORIDE 60 MG: 20 INJECTION, SOLUTION INFILTRATION; PERINEURAL at 13:01

## 2021-01-01 RX ADMIN — ACETAMINOPHEN 650 MG: 325 TABLET ORAL at 18:32

## 2021-01-01 RX ADMIN — LISINOPRIL 40 MG: 20 TABLET ORAL at 08:06

## 2021-01-01 RX ADMIN — ATORVASTATIN CALCIUM 80 MG: 80 TABLET, FILM COATED ORAL at 20:38

## 2021-01-01 RX ADMIN — DIGOXIN 0.25 MCG: 0.25 INJECTION INTRAMUSCULAR; INTRAVENOUS at 02:28

## 2021-01-01 RX ADMIN — HEPARIN SODIUM 3000 UNITS: 1000 INJECTION, SOLUTION INTRAVENOUS; SUBCUTANEOUS at 02:44

## 2021-01-01 RX ADMIN — HEPARIN SODIUM 910 UNITS/HR: 10000 INJECTION, SOLUTION INTRAVENOUS at 01:23

## 2021-01-01 RX ADMIN — ACETAMINOPHEN 650 MG: 325 TABLET ORAL at 10:32

## 2021-01-01 RX ADMIN — POTASSIUM CHLORIDE 20 MEQ: 29.8 INJECTION, SOLUTION INTRAVENOUS at 09:44

## 2021-01-01 RX ADMIN — Medication 2 PUFF: at 07:36

## 2021-01-01 RX ADMIN — TROSPIUM CHLORIDE 20 MG: 20 TABLET, FILM COATED ORAL at 21:23

## 2021-01-01 RX ADMIN — Medication 10 ML: at 22:07

## 2021-01-01 RX ADMIN — SODIUM CHLORIDE, POTASSIUM CHLORIDE, SODIUM LACTATE AND CALCIUM CHLORIDE: 600; 310; 30; 20 INJECTION, SOLUTION INTRAVENOUS at 16:35

## 2021-01-01 RX ADMIN — AMLODIPINE BESYLATE 5 MG: 5 TABLET ORAL at 23:11

## 2021-01-01 RX ADMIN — PANTOPRAZOLE SODIUM 80 MG: 40 INJECTION, POWDER, FOR SOLUTION INTRAVENOUS at 10:01

## 2021-01-01 RX ADMIN — AMLODIPINE BESYLATE 5 MG: 5 TABLET ORAL at 13:18

## 2021-01-01 RX ADMIN — PANTOPRAZOLE SODIUM 40 MG: 40 INJECTION, POWDER, FOR SOLUTION INTRAVENOUS at 11:28

## 2021-01-01 RX ADMIN — INSULIN GLARGINE 20 UNITS: 100 INJECTION, SOLUTION SUBCUTANEOUS at 22:07

## 2021-01-01 RX ADMIN — PANTOPRAZOLE SODIUM 40 MG: 40 TABLET, DELAYED RELEASE ORAL at 06:23

## 2021-01-01 RX ADMIN — HEPARIN SODIUM 4000 UNITS: 1000 INJECTION INTRAVENOUS; SUBCUTANEOUS at 01:21

## 2021-01-01 RX ADMIN — HEPARIN SODIUM: 5000 INJECTION INTRAVENOUS; SUBCUTANEOUS at 06:00

## 2021-01-01 RX ADMIN — Medication 2 PUFF: at 08:32

## 2021-01-01 RX ADMIN — INSULIN LISPRO 1 UNITS: 100 INJECTION, SOLUTION INTRAVENOUS; SUBCUTANEOUS at 11:59

## 2021-01-01 RX ADMIN — CARVEDILOL 6.25 MG: 6.25 TABLET, FILM COATED ORAL at 20:40

## 2021-01-01 RX ADMIN — FENTANYL CITRATE 50 MCG: 50 INJECTION INTRAMUSCULAR; INTRAVENOUS at 14:44

## 2021-01-01 RX ADMIN — IPRATROPIUM BROMIDE AND ALBUTEROL SULFATE 3 ML: .5; 3 SOLUTION RESPIRATORY (INHALATION) at 08:47

## 2021-01-01 RX ADMIN — Medication: at 11:17

## 2021-01-01 RX ADMIN — SUGAMMADEX 200 MG: 100 INJECTION, SOLUTION INTRAVENOUS at 14:39

## 2021-01-01 RX ADMIN — INSULIN LISPRO 1 UNITS: 100 INJECTION, SOLUTION INTRAVENOUS; SUBCUTANEOUS at 13:11

## 2021-01-01 RX ADMIN — HEPARIN SODIUM: 5000 INJECTION INTRAVENOUS; SUBCUTANEOUS at 05:32

## 2021-01-01 RX ADMIN — LORAZEPAM 1 MG: 2 INJECTION INTRAMUSCULAR; INTRAVENOUS at 18:37

## 2021-01-01 RX ADMIN — AMLODIPINE BESYLATE 5 MG: 5 TABLET ORAL at 21:23

## 2021-01-01 RX ADMIN — Medication 10 ML: at 10:09

## 2021-01-01 RX ADMIN — INSULIN LISPRO 1 UNITS: 100 INJECTION, SOLUTION INTRAVENOUS; SUBCUTANEOUS at 22:07

## 2021-01-01 RX ADMIN — POTASSIUM CHLORIDE 10 MEQ: 750 TABLET, EXTENDED RELEASE ORAL at 18:16

## 2021-01-01 RX ADMIN — ACETAMINOPHEN 650 MG: 325 TABLET ORAL at 10:51

## 2021-01-01 RX ADMIN — TIOTROPIUM BROMIDE INHALATION SPRAY 2 PUFF: 3.12 SPRAY, METERED RESPIRATORY (INHALATION) at 12:15

## 2021-01-01 RX ADMIN — HEPARIN SODIUM 910 UNITS/HR: 10000 INJECTION, SOLUTION INTRAVENOUS at 03:13

## 2021-01-01 RX ADMIN — MORPHINE SULFATE 2 MG: 2 INJECTION, SOLUTION INTRAMUSCULAR; INTRAVENOUS at 12:17

## 2021-01-01 RX ADMIN — Medication 2 PUFF: at 12:15

## 2021-01-01 RX ADMIN — CLOPIDOGREL BISULFATE 75 MG: 75 TABLET ORAL at 08:01

## 2021-01-01 RX ADMIN — Medication 4 PUFF: at 15:18

## 2021-01-01 RX ADMIN — TIOTROPIUM BROMIDE INHALATION SPRAY 2 PUFF: 3.12 SPRAY, METERED RESPIRATORY (INHALATION) at 08:32

## 2021-01-01 RX ADMIN — Medication 2 PUFF: at 20:45

## 2021-01-01 RX ADMIN — TROSPIUM CHLORIDE 20 MG: 20 TABLET, FILM COATED ORAL at 09:20

## 2021-01-01 RX ADMIN — TROSPIUM CHLORIDE 20 MG: 20 TABLET, FILM COATED ORAL at 17:04

## 2021-01-01 RX ADMIN — Medication 2000 MG: at 13:24

## 2021-01-01 RX ADMIN — TROSPIUM CHLORIDE 20 MG: 20 TABLET, FILM COATED ORAL at 21:27

## 2021-01-01 RX ADMIN — CLOPIDOGREL BISULFATE 75 MG: 75 TABLET ORAL at 08:35

## 2021-01-01 RX ADMIN — ATORVASTATIN CALCIUM 80 MG: 40 TABLET, FILM COATED ORAL at 21:23

## 2021-01-01 RX ADMIN — METOPROLOL TARTRATE 5 MG: 5 INJECTION INTRAVENOUS at 02:27

## 2021-01-01 RX ADMIN — CARVEDILOL 6.25 MG: 6.25 TABLET, FILM COATED ORAL at 12:14

## 2021-01-01 RX ADMIN — IPRATROPIUM BROMIDE AND ALBUTEROL SULFATE 3 ML: .5; 3 SOLUTION RESPIRATORY (INHALATION) at 20:45

## 2021-01-01 RX ADMIN — ENOXAPARIN SODIUM 40 MG: 40 INJECTION SUBCUTANEOUS at 08:23

## 2021-01-01 RX ADMIN — LORAZEPAM 1 MG: 2 INJECTION INTRAMUSCULAR; INTRAVENOUS at 13:31

## 2021-01-01 RX ADMIN — FENTANYL CITRATE 25 MCG: 50 INJECTION, SOLUTION INTRAMUSCULAR; INTRAVENOUS at 02:59

## 2021-01-01 RX ADMIN — IPRATROPIUM BROMIDE AND ALBUTEROL SULFATE 3 ML: .5; 3 SOLUTION RESPIRATORY (INHALATION) at 07:18

## 2021-01-01 RX ADMIN — ACETAMINOPHEN 650 MG: 325 TABLET ORAL at 04:06

## 2021-01-01 RX ADMIN — CARVEDILOL 6.25 MG: 6.25 TABLET, FILM COATED ORAL at 21:13

## 2021-01-01 RX ADMIN — POTASSIUM CHLORIDE 20 MEQ: 1500 TABLET, EXTENDED RELEASE ORAL at 09:44

## 2021-01-01 RX ADMIN — DOCUSATE SODIUM 50 MG AND SENNOSIDES 8.6 MG 1 TABLET: 8.6; 5 TABLET, FILM COATED ORAL at 08:35

## 2021-01-01 RX ADMIN — CARVEDILOL 6.25 MG: 6.25 TABLET, FILM COATED ORAL at 21:29

## 2021-01-01 RX ADMIN — PANTOPRAZOLE SODIUM 40 MG: 40 INJECTION, POWDER, FOR SOLUTION INTRAVENOUS at 09:43

## 2021-01-01 RX ADMIN — Medication 2 PUFF: at 07:48

## 2021-01-01 RX ADMIN — SODIUM CHLORIDE, POTASSIUM CHLORIDE, SODIUM LACTATE AND CALCIUM CHLORIDE: 600; 310; 30; 20 INJECTION, SOLUTION INTRAVENOUS at 04:57

## 2021-01-01 RX ADMIN — PANTOPRAZOLE SODIUM 40 MG: 40 INJECTION, POWDER, FOR SOLUTION INTRAVENOUS at 07:42

## 2021-01-01 RX ADMIN — ASPIRIN 81 MG: 81 TABLET, CHEWABLE ORAL at 10:02

## 2021-01-01 RX ADMIN — Medication 10 ML: at 08:24

## 2021-01-01 RX ADMIN — ENOXAPARIN SODIUM 40 MG: 40 INJECTION SUBCUTANEOUS at 08:08

## 2021-01-01 RX ADMIN — MIDAZOLAM HYDROCHLORIDE 1 MG: 5 INJECTION INTRAMUSCULAR; INTRAVENOUS at 02:26

## 2021-01-01 RX ADMIN — IPRATROPIUM BROMIDE AND ALBUTEROL SULFATE 3 ML: .5; 3 SOLUTION RESPIRATORY (INHALATION) at 14:06

## 2021-01-01 RX ADMIN — Medication 2 PUFF: at 07:18

## 2021-01-01 RX ADMIN — DILTIAZEM HYDROCHLORIDE 10 MG: 5 INJECTION INTRAVENOUS at 01:15

## 2021-01-01 RX ADMIN — AMIODARONE HYDROCHLORIDE 400 MG: 200 TABLET ORAL at 20:53

## 2021-01-01 RX ADMIN — MORPHINE SULFATE 2 MG: 2 INJECTION, SOLUTION INTRAMUSCULAR; INTRAVENOUS at 13:58

## 2021-01-01 RX ADMIN — ATORVASTATIN CALCIUM 80 MG: 40 TABLET, FILM COATED ORAL at 20:38

## 2021-01-01 RX ADMIN — PROPOFOL 150 MG: 10 INJECTION, EMULSION INTRAVENOUS at 13:39

## 2021-01-01 RX ADMIN — ASPIRIN 81 MG: 81 TABLET, CHEWABLE ORAL at 17:15

## 2021-01-01 RX ADMIN — Medication 2 PUFF: at 18:43

## 2021-01-01 RX ADMIN — MAGNESIUM GLUCONATE 500 MG ORAL TABLET 400 MG: 500 TABLET ORAL at 08:36

## 2021-01-01 RX ADMIN — DOCUSATE SODIUM 50 MG AND SENNOSIDES 8.6 MG 1 TABLET: 8.6; 5 TABLET, FILM COATED ORAL at 21:13

## 2021-01-01 RX ADMIN — IPRATROPIUM BROMIDE AND ALBUTEROL SULFATE 3 ML: .5; 3 SOLUTION RESPIRATORY (INHALATION) at 07:47

## 2021-01-01 RX ADMIN — Medication 2 PUFF: at 19:53

## 2021-01-01 RX ADMIN — INSULIN GLARGINE 20 UNITS: 100 INJECTION, SOLUTION SUBCUTANEOUS at 20:42

## 2021-01-01 RX ADMIN — SODIUM CHLORIDE, SODIUM LACTATE, POTASSIUM CHLORIDE, AND CALCIUM CHLORIDE: .6; .31; .03; .02 INJECTION, SOLUTION INTRAVENOUS at 12:57

## 2021-01-01 RX ADMIN — POTASSIUM CHLORIDE 40 MEQ: 1500 TABLET, EXTENDED RELEASE ORAL at 18:32

## 2021-01-01 RX ADMIN — LISINOPRIL 10 MG: 10 TABLET ORAL at 09:44

## 2021-01-01 RX ADMIN — MORPHINE SULFATE 2 MG: 2 INJECTION, SOLUTION INTRAMUSCULAR; INTRAVENOUS at 15:32

## 2021-01-01 RX ADMIN — CARVEDILOL 6.25 MG: 6.25 TABLET, FILM COATED ORAL at 08:01

## 2021-01-01 RX ADMIN — CARVEDILOL 6.25 MG: 6.25 TABLET, FILM COATED ORAL at 18:26

## 2021-01-01 RX ADMIN — CARVEDILOL 6.25 MG: 6.25 TABLET, FILM COATED ORAL at 08:23

## 2021-01-01 RX ADMIN — IPRATROPIUM BROMIDE AND ALBUTEROL SULFATE 3 ML: .5; 3 SOLUTION RESPIRATORY (INHALATION) at 06:47

## 2021-01-01 RX ADMIN — POLYETHYLENE GLYCOL (3350) 17 G: 17 POWDER, FOR SOLUTION ORAL at 16:49

## 2021-01-01 RX ADMIN — ACETAMINOPHEN 650 MG: 325 TABLET ORAL at 17:19

## 2021-01-01 RX ADMIN — FUROSEMIDE 40 MG: 10 INJECTION, SOLUTION INTRAMUSCULAR; INTRAVENOUS at 10:39

## 2021-01-01 RX ADMIN — FLUDEOXYGLUCOSE F 18 13.71 MILLICURIE: 200 INJECTION, SOLUTION INTRAVENOUS at 17:00

## 2021-01-01 RX ADMIN — MORPHINE SULFATE 2 MG: 2 INJECTION, SOLUTION INTRAMUSCULAR; INTRAVENOUS at 21:09

## 2021-01-01 RX ADMIN — LISINOPRIL 20 MG: 20 TABLET ORAL at 11:22

## 2021-01-01 RX ADMIN — Medication 2 PUFF: at 18:38

## 2021-01-01 RX ADMIN — CLOPIDOGREL BISULFATE 75 MG: 75 TABLET ORAL at 10:03

## 2021-01-01 RX ADMIN — POLYETHYLENE GLYCOL (3350) 17 G: 17 POWDER, FOR SOLUTION ORAL at 08:35

## 2021-01-01 RX ADMIN — LISINOPRIL 20 MG: 20 TABLET ORAL at 20:38

## 2021-01-01 RX ADMIN — Medication 2 PUFF: at 19:17

## 2021-01-01 RX ADMIN — CLOPIDOGREL BISULFATE 75 MG: 75 TABLET ORAL at 17:15

## 2021-01-01 RX ADMIN — LISINOPRIL 40 MG: 20 TABLET ORAL at 08:01

## 2021-01-01 RX ADMIN — INSULIN GLARGINE 20 UNITS: 100 INJECTION, SOLUTION SUBCUTANEOUS at 21:28

## 2021-01-01 RX ADMIN — CARVEDILOL 3.12 MG: 3.12 TABLET, FILM COATED ORAL at 12:13

## 2021-01-01 RX ADMIN — PANTOPRAZOLE SODIUM 40 MG: 40 INJECTION, POWDER, FOR SOLUTION INTRAVENOUS at 12:14

## 2021-01-01 RX ADMIN — INSULIN LISPRO 1 UNITS: 100 INJECTION, SOLUTION INTRAVENOUS; SUBCUTANEOUS at 17:15

## 2021-01-01 RX ADMIN — TICAGRELOR 90 MG: 90 TABLET ORAL at 15:08

## 2021-01-01 RX ADMIN — AMIODARONE HYDROCHLORIDE 400 MG: 200 TABLET ORAL at 20:38

## 2021-01-01 RX ADMIN — DOCUSATE SODIUM 50 MG AND SENNOSIDES 8.6 MG 1 TABLET: 8.6; 5 TABLET, FILM COATED ORAL at 21:23

## 2021-01-01 RX ADMIN — ATORVASTATIN CALCIUM 80 MG: 80 TABLET, FILM COATED ORAL at 20:53

## 2021-01-01 RX ADMIN — ACETAMINOPHEN 650 MG: 325 TABLET ORAL at 16:39

## 2021-01-01 RX ADMIN — AMIODARONE HYDROCHLORIDE 400 MG: 200 TABLET ORAL at 11:22

## 2021-01-01 RX ADMIN — Medication 2000 MG: at 05:00

## 2021-01-01 RX ADMIN — CARVEDILOL 6.25 MG: 6.25 TABLET, FILM COATED ORAL at 08:36

## 2021-01-01 RX ADMIN — TICAGRELOR 90 MG: 90 TABLET ORAL at 20:38

## 2021-01-01 RX ADMIN — IPRATROPIUM BROMIDE AND ALBUTEROL SULFATE 3 ML: .5; 3 SOLUTION RESPIRATORY (INHALATION) at 18:43

## 2021-01-01 RX ADMIN — AMLODIPINE BESYLATE 5 MG: 5 TABLET ORAL at 20:40

## 2021-01-01 RX ADMIN — ACETAMINOPHEN 650 MG: 325 TABLET ORAL at 17:14

## 2021-01-01 RX ADMIN — BISACODYL 5 MG: 5 TABLET, COATED ORAL at 08:23

## 2021-01-01 RX ADMIN — MAGNESIUM GLUCONATE 500 MG ORAL TABLET 400 MG: 500 TABLET ORAL at 12:15

## 2021-01-01 RX ADMIN — LISINOPRIL 10 MG: 10 TABLET ORAL at 20:53

## 2021-01-01 RX ADMIN — DOCUSATE SODIUM 50 MG AND SENNOSIDES 8.6 MG 1 TABLET: 8.6; 5 TABLET, FILM COATED ORAL at 08:23

## 2021-01-01 RX ADMIN — AMIODARONE HYDROCHLORIDE 400 MG: 200 TABLET ORAL at 09:43

## 2021-01-01 RX ADMIN — PROPOFOL 50 MG: 10 INJECTION, EMULSION INTRAVENOUS at 13:03

## 2021-01-01 RX ADMIN — PANTOPRAZOLE SODIUM 40 MG: 40 TABLET, DELAYED RELEASE ORAL at 05:31

## 2021-01-01 RX ADMIN — OXYCODONE 10 MG: 5 TABLET ORAL at 10:03

## 2021-01-01 RX ADMIN — ENOXAPARIN SODIUM 40 MG: 40 INJECTION SUBCUTANEOUS at 08:35

## 2021-01-01 RX ADMIN — ACETAMINOPHEN 650 MG: 325 TABLET ORAL at 08:31

## 2021-01-01 RX ADMIN — TICAGRELOR 90 MG: 90 TABLET ORAL at 09:43

## 2021-01-01 RX ADMIN — MORPHINE SULFATE 2 MG: 2 INJECTION, SOLUTION INTRAMUSCULAR; INTRAVENOUS at 06:21

## 2021-01-01 RX ADMIN — Medication 2000 MG: at 21:10

## 2021-01-01 RX ADMIN — Medication 2 PUFF: at 08:47

## 2021-01-01 RX ADMIN — EPTIFIBATIDE 1 MCG/KG/MIN: 0.75 INJECTION, SOLUTION INTRAVENOUS at 03:20

## 2021-01-01 RX ADMIN — FENTANYL CITRATE 50 MCG: 50 INJECTION, SOLUTION INTRAMUSCULAR; INTRAVENOUS at 02:26

## 2021-01-01 RX ADMIN — INSULIN GLARGINE 20 UNITS: 100 INJECTION, SOLUTION SUBCUTANEOUS at 21:36

## 2021-01-01 RX ADMIN — ACETAMINOPHEN 650 MG: 325 TABLET ORAL at 03:53

## 2021-01-01 RX ADMIN — TROSPIUM CHLORIDE 20 MG: 20 TABLET, FILM COATED ORAL at 20:39

## 2021-01-01 RX ADMIN — ONDANSETRON 4 MG: 2 INJECTION, SOLUTION INTRAMUSCULAR; INTRAVENOUS at 14:39

## 2021-01-01 RX ADMIN — BISACODYL 5 MG: 5 TABLET, COATED ORAL at 08:35

## 2021-01-01 RX ADMIN — MORPHINE SULFATE 2 MG: 2 INJECTION, SOLUTION INTRAMUSCULAR; INTRAVENOUS at 19:40

## 2021-01-01 RX ADMIN — TICAGRELOR 90 MG: 90 TABLET ORAL at 20:53

## 2021-01-01 RX ADMIN — MIDAZOLAM HYDROCHLORIDE 1 MG: 5 INJECTION INTRAMUSCULAR; INTRAVENOUS at 02:59

## 2021-01-01 RX ADMIN — ATORVASTATIN CALCIUM 80 MG: 40 TABLET, FILM COATED ORAL at 21:29

## 2021-01-01 RX ADMIN — Medication 2 PUFF: at 19:13

## 2021-01-01 RX ADMIN — LISINOPRIL 40 MG: 20 TABLET ORAL at 08:24

## 2021-01-01 RX ADMIN — PANTOPRAZOLE SODIUM 8 MG/HR: 40 INJECTION, POWDER, FOR SOLUTION INTRAVENOUS at 11:00

## 2021-01-01 RX ADMIN — PROPOFOL 50 MG: 10 INJECTION, EMULSION INTRAVENOUS at 13:05

## 2021-01-01 RX ADMIN — CARVEDILOL 3.12 MG: 3.12 TABLET, FILM COATED ORAL at 09:44

## 2021-01-01 RX ADMIN — INSULIN LISPRO 1 UNITS: 100 INJECTION, SOLUTION INTRAVENOUS; SUBCUTANEOUS at 16:58

## 2021-01-01 RX ADMIN — FUROSEMIDE 20 MG: 20 TABLET ORAL at 08:36

## 2021-01-01 RX ADMIN — Medication 10 ML: at 08:11

## 2021-01-01 RX ADMIN — INSULIN LISPRO 2 UNITS: 100 INJECTION, SOLUTION INTRAVENOUS; SUBCUTANEOUS at 12:27

## 2021-01-01 RX ADMIN — AMLODIPINE BESYLATE 5 MG: 5 TABLET ORAL at 21:29

## 2021-01-01 RX ADMIN — LISINOPRIL 40 MG: 20 TABLET ORAL at 10:02

## 2021-01-01 RX ADMIN — POLYETHYLENE GLYCOL (3350) 17 G: 17 POWDER, FOR SOLUTION ORAL at 08:25

## 2021-01-01 RX ADMIN — Medication 10 ML: at 09:20

## 2021-01-01 ASSESSMENT — PAIN DESCRIPTION - ORIENTATION
ORIENTATION: LEFT
ORIENTATION: LEFT

## 2021-01-01 ASSESSMENT — PULMONARY FUNCTION TESTS
PIF_VALUE: 18
PIF_VALUE: 17
PIF_VALUE: 19
PIF_VALUE: 18
PIF_VALUE: 1
PIF_VALUE: 18
PIF_VALUE: 17
PIF_VALUE: 17
PIF_VALUE: 16
PIF_VALUE: 18
PIF_VALUE: 18
PIF_VALUE: 16
PIF_VALUE: 20
PIF_VALUE: 1
PIF_VALUE: 18
PIF_VALUE: 18
PIF_VALUE: 17
PIF_VALUE: 18
PIF_VALUE: 17
PIF_VALUE: 18
PIF_VALUE: 26
PIF_VALUE: 2
PIF_VALUE: 1
PIF_VALUE: 17
PIF_VALUE: 18
PIF_VALUE: 18
PIF_VALUE: 20
PIF_VALUE: 5
PIF_VALUE: 1
PIF_VALUE: 18
PIF_VALUE: 19
PIF_VALUE: 17
PIF_VALUE: 18
PIF_VALUE: 1
PIF_VALUE: 13
PIF_VALUE: 18
PIF_VALUE: 18
PIF_VALUE: 16
PIF_VALUE: 16
PIF_VALUE: 17
PIF_VALUE: 18

## 2021-01-01 ASSESSMENT — PAIN SCALES - GENERAL
PAINLEVEL_OUTOF10: 0
PAINLEVEL_OUTOF10: 4
PAINLEVEL_OUTOF10: 0
PAINLEVEL_OUTOF10: 2
PAINLEVEL_OUTOF10: 0
PAINLEVEL_OUTOF10: 2
PAINLEVEL_OUTOF10: 1
PAINLEVEL_OUTOF10: 0
PAINLEVEL_OUTOF10: 4
PAINLEVEL_OUTOF10: 5
PAINLEVEL_OUTOF10: 0
PAINLEVEL_OUTOF10: 1
PAINLEVEL_OUTOF10: 0
PAINLEVEL_OUTOF10: 0
PAINLEVEL_OUTOF10: 2
PAINLEVEL_OUTOF10: 4
PAINLEVEL_OUTOF10: 0
PAINLEVEL_OUTOF10: 0
PAINLEVEL_OUTOF10: 7
PAINLEVEL_OUTOF10: 0
PAINLEVEL_OUTOF10: 7
PAINLEVEL_OUTOF10: 1
PAINLEVEL_OUTOF10: 4
PAINLEVEL_OUTOF10: 5
PAINLEVEL_OUTOF10: 2
PAINLEVEL_OUTOF10: 1
PAINLEVEL_OUTOF10: 7

## 2021-01-01 ASSESSMENT — ENCOUNTER SYMPTOMS
COLOR CHANGE: 0
EYES NEGATIVE: 1
VOMITING: 0
NAUSEA: 0
COLOR CHANGE: 0
RHINORRHEA: 0
GASTROINTESTINAL NEGATIVE: 1
SORE THROAT: 1
SORE THROAT: 0
COUGH: 1
WHEEZING: 1
RHINORRHEA: 0
SHORTNESS OF BREATH: 1
VOICE CHANGE: 0
ABDOMINAL PAIN: 0
ALLERGIC/IMMUNOLOGIC NEGATIVE: 1
SHORTNESS OF BREATH: 0
PHOTOPHOBIA: 0
BACK PAIN: 0
TROUBLE SWALLOWING: 1
TROUBLE SWALLOWING: 0
SHORTNESS OF BREATH: 1

## 2021-01-01 ASSESSMENT — PAIN - FUNCTIONAL ASSESSMENT
PAIN_FUNCTIONAL_ASSESSMENT: PREVENTS OR INTERFERES SOME ACTIVE ACTIVITIES AND ADLS

## 2021-01-01 ASSESSMENT — PAIN DESCRIPTION - PAIN TYPE
TYPE: ACUTE PAIN;SURGICAL PAIN
TYPE: ACUTE PAIN;SURGICAL PAIN
TYPE: ACUTE PAIN

## 2021-01-01 ASSESSMENT — PAIN DESCRIPTION - LOCATION
LOCATION: HIP
LOCATION: LEG
LOCATION: BACK
LOCATION: HIP;LEG
LOCATION: HIP;LEG

## 2021-01-01 ASSESSMENT — PAIN DESCRIPTION - ONSET: ONSET: GRADUAL

## 2021-01-01 ASSESSMENT — PAIN SCALES - WONG BAKER: WONGBAKER_NUMERICALRESPONSE: 8

## 2021-01-01 ASSESSMENT — PAIN DESCRIPTION - DESCRIPTORS
DESCRIPTORS: ACHING
DESCRIPTORS: ACHING

## 2021-01-01 ASSESSMENT — VISUAL ACUITY: OU: 1

## 2021-01-01 ASSESSMENT — LIFESTYLE VARIABLES: SMOKING_STATUS: 1

## 2021-02-19 PROBLEM — J44.9 COPD (CHRONIC OBSTRUCTIVE PULMONARY DISEASE) (HCC): Status: ACTIVE | Noted: 2017-10-03

## 2021-02-19 PROBLEM — I25.10 CAD (CORONARY ARTERY DISEASE): Status: ACTIVE | Noted: 2021-01-01

## 2021-02-19 PROBLEM — R91.1 PULMONARY NODULE: Status: ACTIVE | Noted: 2021-01-01

## 2021-02-19 PROBLEM — K92.2 GI BLEEDING: Status: ACTIVE | Noted: 2021-01-01

## 2021-02-19 NOTE — CONSULTS
Diagnosis Date    Arthritis     CAD (coronary artery disease)     COPD [facility]) [facility]     [facility]     Hyperlipidemia     Hypertension      FAMILY HISTORY   History reviewed. No pertinent family history. SOCIAL HISTORY     Social History     Tobacco Use    Smoking status: Former Smoker     Packs/day: 1.50     Types: Cigarettes     Quit date: 7/23/2017     Years since quitting: 3.5    Smokeless tobacco: Never Used   Substance Use Topics    Alcohol use: No    Drug use: No     SURGICAL HISTORY     Past Surgical History:   Procedure Laterality Date    CORONARY ANGIOPLASTY WITH STENT PLACEMENT      FEMORAL BYPASS Bilateral      ALLERGIES     Allergies   Allergen Reactions    No Known Allergies      CURRENT MEDICATIONS     Current Outpatient Rx   Medication Sig Dispense Refill    albuterol sulfate HFA (PROAIR HFA) 108 (90 Base) MCG/ACT inhaler Inhale 2 puffs into the lungs every 6 hours as needed for Wheezing 1 Inhaler 3    acetaminophen (TYLENOL) 325 MG tablet Take 2 tablets by mouth every 4 hours as needed for Pain or Fever Maximum dose of acetaminophen is 4000 mg from all sources in 24 hours. 120 tablet 3    ipratropium-albuterol (DUONEB) 0.5-2.5 (3) MG/3ML SOLN nebulizer solution Inhale 3 mLs into the lungs 3 times daily 360 mL 0    mometasone-formoterol (DULERA) 200-5 MCG/ACT inhaler Inhale 2 puffs into the lungs 2 times daily Substituted for Budesonide-Formoterol (SYMBICORT).  1 Inhaler 0    atorvastatin (LIPITOR) 80 MG tablet Take 1 tablet by mouth daily 30 tablet 0    amLODIPine (NORVASC) 10 MG tablet Take 0.5 tablets by mouth nightly Note: new lower dose 30 tablet 3    furosemide (LASIX) 40 MG tablet Take 1 tablet by mouth daily 60 tablet 0    trospium (SANCTURA) 60 MG CP24 extended release capsule Take 1 capsule by mouth daily 30 capsule 0    insulin glargine (LANTUS SOLOSTAR) 100 UNIT/ML injection pen Inject 20 Units into the skin nightly 5 Pen 3    insulin lispro (HUMALOG KWIKPEN) 100 UNIT/ML pen Inject 0-6 Units into the skin 3 times daily (before meals) Low Dose Correction Algorithm: BS  No Insulin, -199 1 Unit, 200-249 2 Units, 250-299 3 Units, 300-349 4 Units, 350-399 5 Units, 400 and above give 6 Units 5 Pen 3    carvedilol (COREG) 6.25 MG tablet Take 6.25 mg by mouth 2 times daily      aspirin 81 MG chewable tablet Take 81 mg by mouth daily      lisinopril (PRINIVIL;ZESTRIL) 40 MG tablet Take 40 mg by mouth daily      pantoprazole sodium (PROTONIX) 40 MG PACK packet Take 40 mg by mouth every morning (before breakfast)      SITagliptin-MetFORMIN HCl ER (JANUMET XR) 100-1000 MG TB24 Take 1 tablet by mouth daily      albuterol sulfate HFA (PROVENTIL HFA) 108 (90 BASE) MCG/ACT inhaler Inhale 2 puffs into the lungs every 4 hours as needed for Wheezing or Shortness of Breath With spacer (and mask if indicated). Thanks. 1 Inhaler 1    clopidogrel (PLAVIX) 75 MG tablet Take 75 mg by mouth daily        pantoprazole (PROTONIX) bolus  80 mg Intravenous Once    [START ON 2/22/2021] pantoprazole  40 mg Intravenous Daily    And    [START ON 2/22/2021] sodium chloride (PF)  10 mL Intravenous Daily      pantoprozole (PROTONIX) infusion         HOME MEDICATIONS  [unfilled]  IMMUNIZATIONS     Immunization History   Administered Date(s) Administered    Influenza, MDCK Quadv, IM, PF (Flucelvax 4 yrs and older) 09/29/2017    Tdap (Boostrix, Adacel) 04/01/2016     REVIEW OF SYSTEMS (2-9 systems for level 4, 10 or more for level 5)   See HPI for further details and pertinent postiives. Negative for the following:  Constitutional: Negative for weight change. Negative for appetite change and fatigue. HENT: Negative for nosebleeds, sore throat, mouth sores, trouble swallowing and voice change. Respiratory: Negative for cough, choking and chest tightness.    Cardiovascular: Negative for chest pain   Gastrointestinal: No abdominal pain, heartburn, bloating, dysphagia, cough, chest pain, globus, regurgitation, diarrhea, constipation, nausea, or vomiting. Positive for hematemesis, tarry stools. Musculoskeletal: Negative for arthralgias. Skin: Negative for pallor. Neurological: Negative for weakness and light-headedness. Hematological: Negative for adenopathy. Does not bruise/bleed easily. Psychiatric/Behavioral: Negative for suicidal ideas. PHYSICAL EXAM (7 for level 4, 8 or more for level 5)   VITAL SIGNS: BP (!) 144/73   Pulse 100   Temp 97.8 °F (36.6 °C) (Oral)   Resp 18   Ht 6' (1.829 m)   SpO2 92%   BMI 29.16 kg/m²      Review of available records reveals: Wt Readings from Last 50 Encounters:   09/09/20 215 lb (97.5 kg)   04/09/18 262 lb (118.8 kg)   10/13/17 193 lb 6.4 oz (87.7 kg)   10/02/17 191 lb 2.2 oz (86.7 kg)   07/30/17 208 lb (94.3 kg)   07/25/17 218 lb (98.9 kg)   05/29/17 188 lb (85.3 kg)   03/29/17 200 lb (90.7 kg)   12/24/16 210 lb (95.3 kg)   04/01/16 226 lb (102.5 kg)     Constitutional: Well developed, Well nourished, No acute distress, Non-toxic appearance. HENT: Normocephalic, Atraumatic, Bilateral external ears normal, Oropharynx moist, No oral exudates, Nose normal.   Eyes: Conjunctiva normal, No discharge. Neck: Normal range of motion, No tenderness, Supple, No stridor. Lymphatic: No lymphadenopathy noted. Cardiovascular: Normal heart rate, Normal rhythm, No murmurs, No rubs, No gallops. Thorax & Lungs: Normal breath sounds, No respiratory distress, No wheezing, No chest tenderness. Abdomen: scars consistent with stated surgeries,  Soft NTND   Rectal:  Deferred. Skin: Warm, Dry, No erythema, No rash. Back: No tenderness, No CVA tenderness. Extremities: Intact distal pulses, No edema, No tenderness, No cyanosis, No clubbing. Neurologic: Alert & oriented x 3, Normal motor function, Normal sensory function, No focal deficits noted.      RADIOLOGY/PROCEDURES       COURSE & MEDICAL DECISION MAKING   (See

## 2021-02-19 NOTE — ED NOTES
Called [facility] @ [phone]  Re: GI Bleed: suspected esophageal varices  Dr Ephriam Lesches responded [place] [facility] [place]  02/19/21 4420

## 2021-02-19 NOTE — ANESTHESIA PRE PROCEDURE
insulin lispro (HUMALOG KWIKPEN) 100 UNIT/ML pen Inject 0-6 Units into the skin 3 times daily (before meals) Low Dose Correction Algorithm: BS  No Insulin, -199 1 Unit, 200-249 2 Units, 250-299 3 Units, 300-349 4 Units, 350-399 5 Units, 400 and above give 6 Units 10/3/17   Darrell Manning MD   carvedilol (COREG) 6.25 MG tablet Take 6.25 mg by mouth 2 times daily    Historical Provider, MD   aspirin 81 MG chewable tablet Take 81 mg by mouth daily    Historical Provider, MD   lisinopril (PRINIVIL;ZESTRIL) 40 MG tablet Take 40 mg by mouth daily    Historical Provider, MD   pantoprazole sodium (PROTONIX) 40 MG PACK packet Take 40 mg by mouth every morning (before breakfast)    Historical Provider, MD   SITagliptin-MetFORMIN HCl ER (JANUMET XR) 100-1000 MG TB24 Take 1 tablet by mouth daily    Historical Provider, MD   albuterol sulfate HFA (PROVENTIL HFA) 108 (90 BASE) MCG/ACT inhaler Inhale 2 puffs into the lungs every 4 hours as needed for Wheezing or Shortness of Breath With spacer (and mask if indicated). Thanks. 12/24/16 3/29/17  Dom Llanes MD   clopidogrel (PLAVIX) 75 MG tablet Take 75 mg by mouth daily    Historical Provider, MD       Current medications:    Current Facility-Administered Medications   Medication Dose Route Frequency Provider Last Rate Last Admin    pantoprazole (PROTONIX) 80 mg in sodium chloride 0.9 % 100 mL infusion  8 mg/hr Intravenous Continuous Cristoferjovi Campos PA-C 10 mL/hr at 02/19/21 1100 8 mg/hr at 02/19/21 1100    [START ON 2/22/2021] pantoprazole (PROTONIX) injection 40 mg  40 mg Intravenous Daily Cristofer Campos PA-C        And    [START ON 2/22/2021] sodium chloride (PF) 0.9 % injection 10 mL  10 mL Intravenous Daily Cristofer Campos PA-C           Allergies:     Allergies   Allergen Reactions    No Known Allergies        Problem List:    Patient Active Problem List   Diagnosis Code    Arthritis M19.90    Diabetes mellitus ([facility] E11.9    Hypertension I10  Hyperlipidemia E78.5    Ischemic stroke ([facility] I63.9    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    Chronic obstructive pulmonary disease with acute exacerbation (HCC) J44.1    Tobacco use Z72.0    GI bleeding K92.2       Past Medical History:        Diagnosis Date    Arthritis     CAD (coronary artery disease)     COPD (chronic obstructive pulmonary disease) ([facility]     Diabetes mellitus ([facility]     Hyperlipidemia     Hypertension        Past Surgical History:        Procedure Laterality Date    CORONARY ANGIOPLASTY WITH STENT PLACEMENT      FEMORAL BYPASS Bilateral        Social History:    Social History     Tobacco Use    Smoking status: Former Smoker     Packs/day: 1.50     Types: Cigarettes     Quit date: 7/23/2017     Years since quitting: 3.5    Smokeless tobacco: Never Used   Substance Use Topics    Alcohol use: No                                Counseling given: Not Answered      Vital Signs (Current):   Vitals:    02/19/21 0930 02/19/21 1022 02/19/21 1106 02/19/21 1110   BP: (!) 144/73 118/76 118/70    Pulse: 100 95 92    Resp: 18 18 18    Temp:       TempSrc:       SpO2: 92% 94% 97%    Weight:    166 lb (75.3 kg)   Height:   6' (1.829 m)                                               BP Readings from Last 3 Encounters:   02/19/21 118/70   09/09/20 (!) 158/73   04/09/18 135/71       NPO Status: Time of last liquid consumption: 0300                        Time of last solid consumption: 0300                        Date of last liquid consumption: 02/18/21                        Date of last solid food consumption: 02/18/21    BMI:   Wt Readings from Last 3 Encounters:   02/19/21 166 lb (75.3 kg)   09/09/20 215 lb (97.5 kg)   04/09/18 262 lb (118.8 kg)     Body mass index is 22.51 kg/m².     CBC:   Lab Results   Component Value Date    WBC 9.8 02/19/2021    RBC 5.13 02/19/2021    HGB 15.3 02/19/2021    HCT 46.2 02/19/2021    MCV 90.0 02/19/2021 RDW 14.1 02/19/2021     02/19/2021       CMP:   Lab Results   Component Value Date     02/19/2021    K 3.7 02/19/2021    K 3.2 09/09/2020    CL 99 02/19/2021    CO2 32 02/19/2021    BUN 26 02/19/2021    CREATININE 1.3 02/19/2021    GFRAA >60 02/19/2021    AGRATIO 1.1 02/19/2021    LABGLOM 53 02/19/2021    GLUCOSE 228 02/19/2021    PROT 7.0 02/19/2021    CALCIUM 8.9 02/19/2021    BILITOT 0.8 02/19/2021    ALKPHOS 187 02/19/2021    AST 14 02/19/2021    ALT 8 02/19/2021       POC Tests: No results for input(s): POCGLU, POCNA, POCK, POCCL, POCBUN, POCHEMO, POCHCT in the last 72 hours. Coags:   Lab Results   Component Value Date    PROTIME 12.7 02/19/2021    INR 1.09 02/19/2021    APTT 29.1 10/13/2017       HCG (If Applicable): No results found for: PREGTESTUR, PREGSERUM, HCG, HCGQUANT     ABGs: No results found for: PHART, PO2ART, CPB7RAX, DSO5LGT, BEART, O5CYIWOS     Type & Screen (If Applicable):  No results found for: LABABO, LABRH    Drug/Infectious Status (If Applicable):  No results found for: HIV, HEPCAB    COVID-19 Screening (If Applicable):   Lab Results   Component Value Date    COVID19 Not Detected 02/19/2021         Anesthesia Evaluation  Patient summary reviewed and Nursing notes reviewed no history of anesthetic complications:   Airway: Mallampati: II  TM distance: <3 FB   Neck ROM: limited  Mouth opening: > = 3 FB Dental:    (+) upper dentures      Pulmonary:   (+) COPD (NO O2 REQ., PRN INHALER(PT DENIES USE)):  shortness of breath (EXERT):  rhonchi (B, IMPROVED W/ COUGH),  current smoker (1/2-1 PPD)    (-) asthma, recent URI and sleep apnea          Patient did not smoke on day of surgery.                  Cardiovascular:    (+) hypertension:, CAD: obstructive, CABG/stent (STENTS):, CHF: systolic and diastolic, APPLE:, hyperlipidemia    (-) pacemaker, past MI, dysrhythmias and  angina    ECG reviewed  Rhythm: regular  Rate: normal           Beta Blocker:  Dose within 24 Hrs Neuro/Psych:   (+) psychiatric history (PT POOR HISTORIAN):   (-) seizures, TIA and CVA           GI/Hepatic/Renal:   (+) liver disease (ELEV LFTS):,      (-) GERD, hepatitis, no renal disease, bowel prep and no morbid obesity       Endo/Other:    (+) DiabetesType II DM, using insulin, blood dyscrasia (PLAVIX NOTED): arthritis:., no malignancy/cancer. (-) hypothyroidism, hyperthyroidism, no malignancy/cancer               Abdominal:           Vascular:   + PVD, aortic or cerebral (LE BYPASS), . Anesthesia Plan      TIVA     ASA 3       Induction: intravenous. MIPS: Prophylactic antiemetics administered. Anesthetic plan and risks discussed with patient. Plan discussed with CRNA. This pre-anesthesia assessment may be used as a history and physical.    DOS STAFF ADDENDUM:    Pt seen and examined, chart reviewed (including anesthesia, drug and allergy history). No interval changes to history and physical examination. Anesthetic plan, risks, benefits, alternatives, and personnel involved discussed with patient. Patient verbalized an understanding and agrees to proceed.       Trav Sánchez MD  February 19, 2021  11:42 AM      Trav Sánchez MD   2/19/2021

## 2021-02-19 NOTE — ED PROVIDER NOTES
[facility] Emergency Department    CHIEF COMPLAINT  Hematemesis (Symptoms since 0300. Coffee ground emesis. ) and Diarrhea      SHARED SERVICE VISIT  I have seen and evaluated this patient with my supervising physician, Dr. Lorelei Meehan. HISTORY OF PRESENT ILLNESS  Aleah Schneider is a 66 y.o. male hx of COPD, CAD, Alcohol abuse, who presents to the ED complaining of coffee-ground emesis and black tarry stool since 3 AM this morning. Patient is currently taking Plavix. Patient denies any prior history of GI bleeds. Ms. do shortness of breath but states that is chronic. Denies any chest pain or abdominal pain. Admits to nausea and vomiting. No other complaints, modifying factors or associated symptoms. Nursing notes reviewed. Past Medical History:   Diagnosis Date    Arthritis     CAD (coronary artery disease)     [facility]     Diabetes mellitus ([facility]     Hyperlipidemia     Hypertension      Past Surgical History:   Procedure Laterality Date    CORONARY ANGIOPLASTY WITH STENT PLACEMENT      FEMORAL BYPASS Bilateral      History reviewed. No pertinent family history.   Social History     Socioeconomic History    Marital status:      Spouse name: Not on file    Number of children: Not on file    Years of education: Not on file    Highest education level: Not on file   Occupational History    Not on file   Social Needs    Financial resource strain: Not on file    Food insecurity     Worry: Not on file     Inability: Not on file    Transportation needs     Medical: Not on file     Non-medical: Not on file   Tobacco Use    Smoking status: Former Smoker     Packs/day: 1.50     Types: Cigarettes     Quit date: 7/23/2017     Years since quitting: 3.5    Smokeless tobacco: Never Used   Substance and Sexual Activity    Alcohol use: No    Drug use: No    Sexual activity: Never   Lifestyle    Physical activity Days per week: Not on file     Minutes per session: Not on file    Stress: Not on file   Relationships    Social connections     Talks on phone: Not on file     Gets together: Not on file     Attends Restorationism service: Not on file     Active member of club or organization: Not on file     Attends meetings of clubs or organizations: Not on file     Relationship status: Not on file    Intimate partner violence     Fear of current or ex partner: Not on file     Emotionally abused: Not on file     Physically abused: Not on file     Forced sexual activity: Not on file   Other Topics Concern    Not on file   Social History Narrative    Not on file     Current Facility-Administered Medications   Medication Dose Route Frequency Provider Last Rate Last Admin    pantoprazole (PROTONIX) 80 mg in sodium chloride 0.9 % 100 mL infusion  8 mg/hr Intravenous Continuous Vertell Polina, PA-C 10 mL/hr at 02/19/21 1100 8 mg/hr at 02/19/21 1100    [START ON 2/22/2021] pantoprazole (PROTONIX) injection 40 mg  40 mg Intravenous Daily Vertell Polina, PA-C        And    [START ON 2/22/2021] sodium chloride (PF) 0.9 % injection 10 mL  10 mL Intravenous Daily Vertell Polina, PA-C        meperidine (DEMEROL) injection 12.5 mg  12.5 mg Intravenous Q5 Min PRN Marvel Sharif MD        HYDROmorphone (DILAUDID) injection 0.25 mg  0.25 mg Intravenous Q5 Min PRN Marvel Sharif MD        HYDROmorphone (DILAUDID) injection 0.5 mg  0.5 mg Intravenous Q5 Min PRN Marvel Sharif MD        morphine (PF) injection 1 mg  1 mg Intravenous Q5 Min PRN Marvel Sharif MD        morphine (PF) injection 2 mg  2 mg Intravenous Q5 Min PRN Marvel Sharif MD        oxyCODONE-acetaminophen (PERCOCET) 5-325 MG per tablet 1 tablet  1 tablet Oral PRN Marvel Sharif MD        Or  oxyCODONE-acetaminophen (PERCOCET) 5-325 MG per tablet 2 tablet  2 tablet Oral PRN Dorothy Hare MD        ondansetron [facility]) injection 4 mg  4 mg Intravenous Once PRN Dorothy Hare MD         Allergies   Allergen Reactions    No Known Allergies        REVIEW OF SYSTEMS  10 systems reviewed, pertinent positives per HPI otherwise noted to be negative    PHYSICAL EXAM  /70   Pulse 92   Temp 97.8 °F (36.6 °C) (Oral)   Resp 18   Ht 6' (1.829 m)   Wt 166 lb (75.3 kg)   SpO2 97%   BMI 22.51 kg/m²   GENERAL APPEARANCE: Awake and alert. Cooperative. Copious coffee-ground emesis diffusely spread across patients body. HEAD: Normocephalic. Atraumatic. EYES: PERRL. EOM's grossly intact. ENT: Mucous membranes are moist. Coffee ground emesis in oropharynx. Unable to idenify active source of bleeding. NECK: Supple. HEART: RRR. No murmurs. LUNGS: Respirations unlabored. CTAB. Good air exchange. Speaking comfortably in full sentences. ABDOMEN: Soft. Non-distended. Non-tender. No guarding or rebound. No masses. No organomegaly. EXTREMITIES: No peripheral edema. Moves all extremities equally. All extremities neurovascularly intact. SKIN: Warm and dry. No acute rashes. NEUROLOGICAL: Alert and oriented. CN's 2-12 intact. No gross facial drooping. Strength 5/5, sensation intact. PSYCHIATRIC: Normal mood and affect. Rectal:     RADIOLOGY  XR CHEST PORTABLE   Final Result   Indeterminate right perihilar nodule. CT of the chest recommended for   further evaluation.                LABS  Labs Reviewed   CBC WITH AUTO DIFFERENTIAL - Abnormal; Notable for the following components:       Result Value    Neutrophils Absolute 8.8 (*)     Lymphocytes Absolute 0.5 (*)     All other components within normal limits    Narrative:     Performed at:  [facility]  [address] [place] [address],  [place], [address] [place] [address] [employer]   Phone [phone] COMPREHENSIVE METABOLIC PANEL - Abnormal; Notable for the following components:    Glucose 228 (*)     BUN 26 (*)     GFR Non-African American 53 (*)     Alkaline Phosphatase 187 (*)     ALT 8 (*)     AST 14 (*)     All other components within normal limits    Narrative:     Performed at:  [address], [facility] [employer]   Phone [phone], RAPID    Narrative:     Performed at:  [place] [address] [employer]   Phone [phone]   CULTURE, BLOOD 2   CULTURE, BLOOD 1   PROTIME-INR    Narrative:     Performed at:  [place] [address] [employer]   Phone [phone]   BLOOD OCCULT STOOL SCREEN #1    Narrative:     ORDER#: [id number]                          ORDERED BY: Toby Kenney  SOURCE: Stool                              COLLECTED:  02/19/21 09:10  ANTIBIOTICS AT JAH.:                      RECEIVED :  02/19/21 09:47  Performed at:  [place] [address] [employer]   Phone [phone]   LACTATE, SEPSIS    Narrative:     Performed at:  [place] [address] [employer]   Phone [phone]   TYPE AND SCREEN    Narrative:     Performed at:  [place] [address] [employer]   Phone [phone]       PROCEDURES  Unless otherwise noted below, none  Critical Care  Performed by: Nithya Maher PA-C  Authorized by:  Beni Bautista MD     Critical care provider statement:     Critical care time (minutes):  35    Critical care time was exclusive of:  Separately billable procedures and treating other patients    Critical care was necessary to treat or prevent imminent or life-threatening deterioration of the following conditions:  Circulatory failure and shock Critical care was time spent personally by me on the following activities:  Discussions with consultants, evaluation of patient's response to treatment, examination of patient, obtaining history from patient or surrogate, review of old charts, re-evaluation of patient's condition, pulse oximetry, ordering and review of radiographic studies and ordering and review of laboratory studies        ED COURSE  Triage vitals tachycardic with a normal blood pressure and oxygen saturation 94% on room air. Afebrile      MDM  57-year-old male with a history of alcohol use, presented to the emergency department for evaluation of hematic emesis and black tarry stool. Upon arrival patient had large amounts of coffee-ground emesis and black tarry stool in his body. Abdomen was soft nontender unable to locate source of bleeding visually. High suspicion for upper GI bleed. Work was obtained which showed a hemoglobin of 15.3 hematocrit 46.2. INR 1.09. Occult stool screen was sent to lab and was resulted as negative; however on visual inspection and rectal exam there was melena appreciated. Patient was started on Protonix bolus and continuous infusion. Reached out to the gastroenterology service who recommend that we hold on prophylactic antibiotic coverage at this time until diuresis is either confirmed or ruled out. EIF said the plan for this patient would be to admitted to the hospital service with plan for urgent endoscopy. Reach out to the hospitalist service to discuss patient case. Discussed how the patient does not have a documented history of cirrhosis but does admit to a history of alcohol use. Patient remained stable here in the emergency department was transferred to the endoscopy suite for further evaluation. Patient will be admitted to the hospital service. DISPOSITION  Patient was discharged to home in good condition. CLINICAL IMPRESSION  No diagnosis found.        Yolanda Benitez PA-C

## 2021-02-19 NOTE — ED NOTES
Bed: 12  Expected date:   Expected time:   Means of arrival:   Comments:  Courtney Bautista RN  02/19/21 0636

## 2021-02-19 NOTE — ED PROVIDER NOTES
CHIEF COMPLAINT  Hematemesis (Symptoms since 0300. Coffee ground emesis. ) and Diarrhea      HISTORY OF PRESENT ILLNESS  Eliot Benjamin is a 66 y.o. male with a history of CAD, COPD, diabetes mellitus, and hypertension who presents to the ED complaining of hematemesis and melena. Patient reports remote history of alcoholism. He denies fevers, chills, or sweats. No abdominal pain noted. He states that approximately 3 AM he began vomiting dark vomitus. Physical exam: General: Moderate discomfort. Hematemesis noted. Heart: Mild tachycardia. Normal S1-S2. No rubs, gallops, or murmurs. Abdomen: Soft. Nontender nondistended. No rebound, guarding. Normal bowel sounds. LABS  I have reviewed all labs for this visit. Results for orders placed or performed during the hospital encounter of 02/19/21   EKG 12 Lead   Result Value Ref Range    Ventricular Rate 98 BPM    Atrial Rate 98 BPM    P-R Interval 146 ms    QRS Duration 82 ms    Q-T Interval 362 ms    QTc Calculation (Bazett) 462 ms    P Axis 80 degrees    R Axis 82 degrees    T Axis 43 degrees    Diagnosis       Normal sinus rhythmRight atrial enlargementSeptal infarct (cited on or before 25-JUL-2017)Lateral infarct (cited on or before 25-JUL-2017)Abnormal ECGWhen compared with ECG of 13-OCT-2017 23:41,Questionable change in initial forces of Lateral leadsNon-specific change in ST segment in Lateral leadsNonspecific T wave abnormality no longer evident in Inferior leadsT wave amplitude has increased in Anterior leads       EKG  The Ekg interpreted by myself  normal sinus rhythm with a rate of 98  Axis is   Normal  QTc is  normal  Intervals and Durations are unremarkable. No specific ST-T wave changes appreciated. Septal Q waves. Consistent with previous EKG on 10/13/2017  Cardiac Monitoring: No ectopy. Normal rate. RADIOLOGY  X-RAYS:  I have reviewed radiologic plain film image(s).   ALL OTHER NON-PLAIN FILM IMAGES SUCH AS CT, ULTRASOUND AND MRI HAVE BEEN READ BY THE RADIOLOGIST. XR CHEST PORTABLE   Final Result   Indeterminate right perihilar nodule. CT of the chest recommended for   further evaluation. Rechecks: Physical assessment performed. 1022: Pulse 95. Vital stable. 1106: Pulse 92. O2 sats stable. Blood pressure 118.    1301: Blood pressure 149/72. Critical care 35 minutes was completed on patient in addition to and excluding the procedures noted secondary to concerns for GI hemorrhage. ED COURSE/MDM  Patient seen and evaluated. Old records reviewed. Labs and imaging reviewed and results discussed with patient. Patient was given Protonix bolus and drip. Patient remained stable throughout his stay in the emergency department. Plan of care discussed with patient and family. Patient and family in agreement with plan. Patient was given scripts for the following medications. I counseled patient how to take these medications. Current Discharge Medication List          CLINICAL IMPRESSION  1. Gastrointestinal hemorrhage with hematemesis    2. Melena        Blood pressure (!) 144/73, pulse 100, temperature 97.8 °F (36.6 °C), temperature source Oral, resp. rate 18, height 6' (1.829 m), SpO2 92 %. DISPOSITION  Roberto Briseno was admitted in stable condition.         Mendel Fontan,   02/19/21 2312

## 2021-02-19 NOTE — ED NOTES
Report from endo nurse pt coming back to ER from ENDO procedure on scope corn and coffee ground emesis seen no signs of active bleeding son at bed side and is aware of report.       Cammie Decker RN  02/19/21 1989

## 2021-02-19 NOTE — PROGRESS NOTES
RESPIRATORY THERAPY ASSESSMENT    Name:  Taylor Carroll  Medical Record Number:  [id number]  Age: 66 y.o. Gender: male  : 1945  Today's Date:  2021  Room:  [address] [place] [address]-    Assessment     Is the patient being admitted for a COPD or Asthma exacerbation? No   (If yes the patient will be seen every 4 hours for the first 24 hours and then reassessed)    Patient Admission Diagnosis      Allergies  Allergies   Allergen Reactions    No Known Allergies        Minimum Predicted Vital Capacity:     000          Actual Vital Capacity:      000              Pulmonary History:COPD  Home Oxygen Therapy:  room air  Home Respiratory Therapy:Albuterol, Albuterol/Ipratropium Bromide HHN and Budesonide/Formoterol    Current Respiratory Therapy:  Dulera 2p BID, Duoneb TID          Respiratory Severity Index(RSI)   Patients with orders for inhalation medications, oxygen, or any therapeutic treatment modality will be placed on Respiratory Protocol. They will be assessed with the first treatment and at least every 72 hours thereafter. The following severity scale will be used to determine frequency of treatment intervention.     Smoking History: Pulmonary Disease or Smoking History, Greater than 15 pack year = 2    Social History  Social History     Tobacco Use    Smoking status: Former Smoker     Packs/day: 1.50     Types: Cigarettes     Quit date: 2017     Years since quitting: 3.5    Smokeless tobacco: Never Used   Substance Use Topics    Alcohol use: No    Drug use: No       Recent Surgical History: None = 0  Past Surgical History  Past Surgical History:   Procedure Laterality Date    CORONARY ANGIOPLASTY WITH STENT PLACEMENT      FEMORAL BYPASS Bilateral        Level of Consciousness: Alert, Oriented, and Cooperative = 0    Level of Activity: Walking unassisted = 0    Respiratory Pattern: Regular Pattern; RR 8-20 = 0    Breath Sounds: Diminished unilaterally = 1    Sputum   ,  ,    Cough: Strong, spontaneous, non-productive = 0    Vital Signs   BP (!) 157/69   Pulse 89   Temp 97.8 °F (36.6 °C) (Oral)   Resp 18   Ht 6' (1.829 m)   Wt 166 lb (75.3 kg)   SpO2 96%   BMI 22.51 kg/m²   SPO2 (COPD values may differ): Greater than or equal to 92% on room air = 0    Peak Flow (asthma only): not applicable = 0    RSI: 0-4 = See once and convert to home regimen or discontinue        Plan       Goals: medication delivery, mobilize retained secretions, volume expansion and improve oxygenation    Patient/caregiver was educated on the proper method of use for Respiratory Care Devices:  Yes      Level of patient/caregiver understanding able to:   ? Verbalize understanding   ? Demonstrate understanding       ? Teach back        ? Needs reinforcement       ? No available caregiver               ? Other:     Response to education:  Good     Is patient being placed on Home Treatment Regimen? Yes     Does the patient have everything they need prior to discharge? NA     Comments: Pt and chart reviewed    Plan of Care: Continue as ordered-home regimen    Electronically signed by Boo Valdez RCP on 2/19/2021 at 4:50 PM    Respiratory Protocol Guidelines     1. Assessment and treatment by Respiratory Therapy will be initiated for medication and therapeutic interventions upon initiation of aerosolized medication. 2. Physician will be contacted for respiratory rate (RR) greater than 35 breaths per minute. Therapy will be held for heart rate (HR) greater than 140 beats per minute, pending direction from physician. 3. Bronchodilators will be administered via Metered Dose Inhaler (MDI) with spacer when the following criteria are met:  a. Alert and cooperative     b. HR < 140 bpm  c. RR < 30 bpm                d. Can demonstrate a 23 second inspiratory hold  4. Bronchodilators will be administered via Hand Held Nebulizer [facility]) to patients when ANY of the following criteria are met  a.  Incognizant or uncooperative b. Patients treated with HHN at Home        c. Unable to demonstrate proper use of MDI with spacer     d. RR > 30 bpm   5. Bronchodilators will be delivered via Metered Dose Inhaler (MDI), HHN, Aerogen to intubated patients on mechanical ventilation. 6. Inhalation medication orders will be delivered and/or substituted as outlined below. Aerosolized Medications Ordering and Administration Guidelines:    1. All Medications will be ordered by a physician, and their frequency and/or modality will be adjusted as defined by the patients Respiratory Severity Index (RSI) score. 2. If the patient does not have documented COPD, consider discontinuing anticholinergics when RSI is less than 9.  3. If the bronchospasm worsens (increased RSI), then the bronchodilator frequency can be increased to a maximum of every 4 hours. If greater than every 4 hours is required, the physician will be contacted. 4. If the bronchospasm improves, the frequency of the bronchodilator can be decreased, based on the patient's RSI, but not less than home treatment regimen frequency. 5. Bronchodilator(s) will be discontinued if patient has a RSI less than 9 and has received no scheduled or as needed treatment for 72  Hrs. Patients Ordered on a Mucolytic Agent:    1. Must always be administered with a bronchodilator. 2. Discontinue if patient experiences worsened bronchospasm, or secretions have lessened to the point that the patient is able to clear them with a cough. Anti-inflammatory and Combination Medications:    1. If the patient lacks prior history of lung disease, is not using inhaled anti-inflammatory medication at home, and lacks wheezing by examination or by history for at least 24 hours, contact physician for possible discontinuation.

## 2021-02-19 NOTE — OP NOTE
[address] ,  [address] [facility]  Phone: [phone]  [address] [facility],  [address] [place] [address], [address] [place] [address]  Phone: [phone]   [facility]:[phone]    EGD Procedure Note    Patient: Mo Galvan  : 1945    Procedure: Esophagogastroduodenoscopy    Date:  2021     Endoscopist:  Boo Packer MD    Referring Physician:  Renny Perez    Preoperative Diagnosis:  GI bleeding [K92.2]    Anesthesia: Anesthesia: MAC  ASA Class: 2  Mallampati: II (soft palate, uvula, fauces visible)    Indications: This is a 66y.o. year old male who presents today with Hematemesis, melena    Procedure Details  Informed consent was obtained for the procedure, including conscious sedation. Risks of pancreatitis, infection, perforation, hemorrhage, adverse drug reaction and aspiration were discussed. The patient was placed in the left lateral decubitus position. Based on the pre-procedure assessment, including review of the patient's medical history, medications, allergies, and review of systems, he had been deemed to be an appropriate candidate for conscious sedation; he was therefore sedated with the medications listed above. He was monitored continuously with ECG tracing, pulse oximetry, blood pressure monitoring, and direct observation. A gastroscope was inserted into the mouth and advanced under direct vision to second portion of the duodenum. A careful inspection was made as the gastroscope was withdrawn, including a retroflexed view of the proximal stomach including views of the incisura and cardia; findings and interventions are described below. Appropriate photodocumentation Was Obtained. If photos taken, they were ordered to be scanned into the medical record. Findings: -normal esophagus, stomach, and duodenum  -Medium (3-5 cm) sliding hiatal hernia.  -there was some corn and residual non-blood fluids in the stomach.     Specimens: Was Not Obtained    Complications:   None; patient tolerated the procedure well. Disposition:   PACU - hemodynamically stable. Estimated Blood loss:  none    Impression:   -See post-procedure diagnoses.     Recommendations:  -ok to start diet and if tolerates without further bleeding, ok to discharge  -empiric ppi for a few weeks recommended for the possibility of an unseen jarad hill tear        WILLOW REYNOSO 2/19/21 1:11 PM EST

## 2021-02-19 NOTE — H&P
Hospital Medicine History & Physical      PCP: Edel Grace    Date of Admission: 2/19/2021    Date of Service: Pt seen/examined on 2/19/2021 and Admitted to Inpatient with expected LOS greater than two midnights due to medical therapy. Chief Complaint:  Hematemesis    History Of Present Illness:   66 y.o. male who presented to [facility] with hematemsis. PMHx significant for COPD, CAD, DM2, HTN. He presented with 1 day history of N/V and hematemesis. He reports the vomiting started first and later developed coffee ground type emesis. Also noted dark stool. No prior history of GI bleed. He takes ASA and Plavix for CAD. Found to have stable Hb. Past Medical History:          Diagnosis Date    Arthritis     CAD (coronary artery disease)     [facility]) [facility]     Diabetes mellitus ([facility]     Hyperlipidemia     Hypertension        Past Surgical History:          Procedure Laterality Date    CORONARY ANGIOPLASTY WITH STENT PLACEMENT      FEMORAL BYPASS Bilateral        Medications Prior to Admission:      Prior to Admission medications    Medication Sig Start Date End Date Taking? Authorizing Provider   albuterol sulfate HFA (PROAIR HFA) 108 (90 Base) MCG/ACT inhaler Inhale 2 puffs into the lungs every 6 hours as needed for Wheezing 4/9/18   Ilia Ty MD   acetaminophen (TYLENOL) 325 MG tablet Take 2 tablets by mouth every 4 hours as needed for Pain or Fever Maximum dose of acetaminophen is 4000 mg from all sources in 24 hours. 10/3/17   Morena Gates MD   ipratropium-albuterol (DUONEB) 0.5-2.5 (3) MG/3ML SOLN nebulizer solution Inhale 3 mLs into the lungs 3 times daily 10/3/17   Morena Gates MD   mometasone-formoterol [facility]) 200-5 MCG/ACT inhaler Inhale 2 puffs into the lungs 2 times daily Substituted for Budesonide-Formoterol (SYMBICORT).  10/3/17   Morena Gates MD   atorvastatin (LIPITOR) 80 MG tablet Take 1 tablet by mouth daily 10/3/17   Claudette Cleaves MD Osiel   amLODIPine (NORVASC) 10 MG tablet Take 0.5 tablets by mouth nightly Note: new lower dose 10/3/17   Ranjeet Noyola MD   furosemide (LASIX) 40 MG tablet Take 1 tablet by mouth daily 10/3/17   Ranjeet Noyola MD   trospium [facility]) 60 MG CP24 extended release capsule Take 1 capsule by mouth daily 10/3/17   Ranjeet Noyola MD   insulin glargine (LANTUS SOLOSTAR) 100 UNIT/ML injection pen Inject 20 Units into the skin nightly 10/3/17   Ranjeet Noyola MD   insulin lispro (HUMALOG KWIKPEN) 100 UNIT/ML pen Inject 0-6 Units into the skin 3 times daily (before meals) Low Dose Correction Algorithm: BS  No Insulin, -199 1 Unit, 200-249 2 Units, 250-299 3 Units, 300-349 4 Units, 350-399 5 Units, 400 and above give 6 Units 10/3/17   Ranjeet Noyola MD   carvedilol (COREG) 6.25 MG tablet Take 6.25 mg by mouth 2 times daily    Historical Provider, MD   aspirin 81 MG chewable tablet Take 81 mg by mouth daily    Historical Provider, MD   lisinopril (PRINIVIL;ZESTRIL) 40 MG tablet Take 40 mg by mouth daily    Historical Provider, MD   pantoprazole sodium (PROTONIX) 40 MG PACK packet Take 40 mg by mouth every morning (before breakfast)    Historical Provider, MD   SITagliptin-MetFORMIN HCl ER (JANUMET XR) 100-1000 MG TB24 Take 1 tablet by mouth daily    Historical Provider, MD   albuterol sulfate HFA (PROVENTIL HFA) 108 (90 BASE) MCG/ACT inhaler Inhale 2 puffs into the lungs every 4 hours as needed for Wheezing or Shortness of Breath With spacer (and mask if indicated). Thanks. 12/24/16 3/29/17  Fam Kelley MD   clopidogrel (PLAVIX) 75 MG tablet Take 75 mg by mouth daily    Historical Provider, MD       Allergies:  No known allergies    Social History:    TOBACCO:   reports that he quit smoking about 3 years ago. His smoking use included cigarettes. He smoked 1.50 packs per day. He has never used smokeless tobacco.  ETOH:   reports no history of alcohol use.   E-Cigarettes/Vaping Use     Questions Responses E-Cigarette/Vaping Use     Start Date     Passive Exposure     Quit Date     Counseling Given     Comments             Family History:      History reviewed. No pertinent family history. REVIEW OF SYSTEMS:   Pertinent positives as noted in the HPI. All other systems reviewed and negative. Physical Exam Performed:    /63   Pulse 79   Temp 97.8 °F (36.6 °C) (Oral)   Resp 24   Ht 6' (1.829 m)   Wt 166 lb (75.3 kg)   SpO2 94%   BMI 22.51 kg/m²     General appearance: No apparent distress, appears stated age and cooperative. HEENT:  Normal cephalic, atraumatic without obvious deformity. Pupils equal, round, and reactive to light. Extra ocular muscles intact. Conjunctivae/corneas clear. Neck: Supple, no jugular venous distention. Trachea midline with full range of motion. Respiratory:  Normal respiratory effort. Clear to auscultation, bilaterally without Rales/Wheezes/Rhonchi. Cardiovascular: Regular rate and rhythm with normal S1/S2 without murmurs, rubs or gallops. Abdomen: Soft, non-tender, non-distended with normal bowel sounds. Musculoskelatal: No clubbing, cyanosis or edema bilaterally. Full range of motion without deformity. Neurologic:  Neurovascularly intact without any focal sensory/motor deficits. Cranial nerves: II-XII intact, grossly non-focal.  Psychiatric: Alert and oriented, thought content appropriate, normal insight  Skin: Skin color, texture, turgor normal.  No rashes or lesions. Capillary Refill: Brisk,< 3 seconds   Peripheral Pulses: +2 palpable, equal bilaterally       Labs:     Recent Labs     02/19/21  0839   WBC 9.8   HGB 15.3   HCT 46.2        Recent Labs     02/19/21  0839      K 3.7   CL 99   CO2 32   BUN 26*   CREATININE 1.3   CALCIUM 8.9     Recent Labs     02/19/21  0839   AST 14*   ALT 8*   BILITOT 0.8   ALKPHOS 187*     Recent Labs     02/19/21  0839   INR 1.09     No results for input(s): Lavinia Rutherford in the last 72 hours.     Radiology: CXR: I have reviewed the CXR with the following interpretation: Right perihilar nodule; consider CT. EKG:  I have reviewed the EKG with the following interpretation: NSR, no acute ischemic changes. Xr Chest Portable    Result Date: 2/19/2021  EXAMINATION: ONE XRAY VIEW OF THE CHEST 2/19/2021 9:10 am COMPARISON: 04/09/2018 HISTORY: ORDERING SYSTEM PROVIDED HISTORY: gi bleeding TECHNOLOGIST PROVIDED HISTORY: Reason for exam:->gi bleeding Reason for Exam: gi bleed Acuity: Acute Type of Exam: Initial FINDINGS: The lungs are hyperexpanded with chronic pleuroparenchymal scarring noted in the periphery of the left mid lung. There is a faint 2 cm nodular asymmetry within the right perihilar region. There is no acute lobar consolidation, pneumothorax or effusion. Heart size and vascularity are stable. Indeterminate right perihilar nodule. CT of the chest recommended for further evaluation. ASSESSMENT:    Principal Problem:    GI bleeding  Active Problems:    Diabetes mellitus (HCC)    Hypertension    COPD (chronic obstructive pulmonary disease) (HCC)    Pulmonary nodule    CAD (coronary artery disease)  Resolved Problems:    * No resolved hospital problems. *      PLAN:    GI bleeding: Concern for upper GI bleeding. Hb stable. Bleeding has resolved. GI consulted. EGD unremarkable for active bleeding; cannot rule out small Tracey-Clara tear that wasn't visualized. Clinical history suggestive of MW tear as vomiting episodes preceded bleeding. Continue PO Protonix. Monitor Hb and for any further bleeding. Diet as tolerated. Pulmonary nodule: Possible 2 cm right perihilar nodule in former smoker. Recommend CT chest for further evaluation. Diabetes Mellitus, Type 2: Controlled. Hold home oral regimen. Continue basal-bolus Insulin regimen. Monitor blood sugar and adjust regimen as needed. Diabetic (Carb-controlled) diet when able. Hypertension, benign: Controlled.  Continue current medication regimen, monitor and adjust as needed. COPD (chronic obstructive pulmonary disease): Stable. Continue bronchodilators. CAD (coronary artery disease): Remote stent. Continue to hold ASA and Plavix for now.        DVT Prophylaxis: SCDs  Diet: DIET LOW FIBER;  Code Status: Prior    PT/OT Eval Status: NA    Dispo - Home in AM if stable     Franklin Shelby MD

## 2021-02-19 NOTE — ED NOTES
Spoke with registration and made them aware that pt son wants his dad in as private no one else is allowed to know he is here nor get any information on him. Floor nurse getting report is aware of information.      Jann Bradley RN  02/19/21 Wei [id number], SHEELA  02/19/21 7851

## 2021-02-19 NOTE — ED NOTES
Coffee ground emesis noted on face, beard, and R arm. Pt incontinent of stool. Bed bath performed and pt placed in gown. Pt junior well.       Jacklyn Tello RN  02/19/21 5987

## 2021-02-19 NOTE — ANESTHESIA POSTPROCEDURE EVALUATION
Department of Anesthesiology  Postprocedure Note    Patient: Roberto Briseno  MRN: [id number]  YOB: 1945  Date of evaluation: 2/19/2021  Time:  1:35 PM     Procedure Summary     Date: 02/19/21 Room / Location: [address]    Anesthesia Start: 1257 Anesthesia Stop: 1335    Procedure: EGD CONTROL HEMORRHAGE (N/A ) Diagnosis: (GI BLED)    Surgeons: Tino Ashley MD Responsible Provider: Bianka Esparza MD    Anesthesia Type: TIVA ASA Status: 3          Anesthesia Type: TIVA    Arnoldo Phase I: Arnoldo Score: 10    Arnoldo Phase II: Arnoldo Score: 8    Last vitals: Reviewed and per EMR flowsheets.      Vitals:    02/19/21 1106 02/19/21 1110 02/19/21 1312 02/19/21 1319   BP: 118/70  105/62 110/63   Pulse: 92  78 79   Resp: 18  18 24   Temp:       TempSrc:       SpO2: 97%  96% 94%   Weight:  166 lb (75.3 kg)     Height: 6' (1.829 m)        Anesthesia Post Evaluation    Patient location during evaluation: bedside  Patient participation: complete - patient participated  Level of consciousness: awake and alert  Airway patency: patent  Nausea & Vomiting: no nausea  Complications: no  Cardiovascular status: hemodynamically stable  Respiratory status: acceptable  Hydration status: euvolemic

## 2021-02-20 NOTE — PROGRESS NOTES
Perfect serve sent to Umesh Miranda MD:    \"Pt just had 32 beat run of [facility], had two episodes last night. Pt not experiencing any c/p or SOB, however was feeling a little shaky. HR: 67, BP: 155/81. Pt's son plans to pick him up later this evening for home health care. Do you still want to d/c? Thanks. \"

## 2021-02-20 NOTE — CARE COORDINATION
CASE MANAGEMENT INITIAL ASSESSMENT      Reviewed chart and completed assessment via telephone with:son, Destini Cobb  Explained Case Management role/services. yes    Primary contact information:Seth Aggarwal [address] :     Juliocesar Turk      Can this person be reached and be able to respond quickly, such as within a few minutes or hours? Yes  Who would be your back-up decision maker? Name Mercedez farnsworth,daughter  Phone Number:[phone]    Admit date/status:2/19/2021  Diagnosis:GI bleed  Is this a Readmission?:  No      Insurance:[employer] Medicare   Precert required for SNF: Yes       3 night stay required: No    Living arrangements, Adls, care needs, prior to admission:lives alone. IPTA    Transportation:TBD     Durable Medical Equipment at home:  Walker__Cane__RTS__ BSC__Shower Chair__  02__ HHN__ CPAP__  BiPap__  Hospital Bed__ W/C___ Other__________    Services in the home and/or outpatient, prior to admission:none      PT/OT recs:SNF    Hospital Exemption Notification (HEN):needed for snf    Barriers to discharge:none    Plan/comments:patient would like to return to home at discharge but son feels rehab would be best. Patient agrees to sons wishes. Son would like patient to go to [facility]. JUICE explained that patient may not be accepted by ARU and they might need to consider a SNF option. Son says at this time he just wants to try for the ARU. CM perfected served MD to see if agreeable to an ARU referral. awaiting response.       ECOC on chart for MD signature yes

## 2021-02-20 NOTE — PROGRESS NOTES
Perfect serve sent to Ru Benjamin MD:    \"Pt very unsteady on feet, requiring two person assist. I think he would benefit from PT/OT orders. Can I place orders for them to see him prior to d/c? Thanks! \"

## 2021-02-20 NOTE — HOME CARE
Alessandra Brown will require the following home care treatments or therapies: Skilled Nursing, vital signs, medication compliance and education, PT/OT, wound care, teaching and management of medical conditions, etc.  Home care will be necessary because of deconditioning. The patient is in agreement to receiving home care.

## 2021-02-20 NOTE — PROGRESS NOTES
Pt's blood sugar was 68. Orange juice and crackers given prior to breakfast tray. Pt sitting up eating breakfast, current BS 77. Will recheck after completion of tray, will continue to monitor.

## 2021-02-20 NOTE — DISCHARGE INSTR - COC
Patient Infection Status       Infection Onset Added Last Indicated Last Indicated By Review Planned Expiration Resolved Resolved By    None active    Resolved    COVID-19 Rule Out 02/19/21 02/19/21 02/19/21 COVID-19, Rapid (Ordered)   02/19/21 Rule-Out Test Resulted            Nurse Assessment:  Last Vital Signs: BP (!) 151/73   Pulse 73   Temp 97.8 °F (36.6 °C) (Oral)   Resp 17   Ht 6' (1.829 m)   Wt 156 lb 14.4 oz (71.2 kg)   SpO2 94%   BMI 21.28 kg/m²     Last documented pain score (0-10 scale): Pain Level: 0  Last Weight:   Wt Readings from Last 1 Encounters:   02/20/21 156 lb 14.4 oz (71.2 kg)     Mental Status:  oriented, alert, coherent, logical, thought processes intact and able to concentrate and follow conversation    IV Access:  - None    Nursing Mobility/ADLs:  Walking   Assisted  Transfer  Assisted  Bathing  Assisted  Dressing  Assisted  Toileting  Assisted  Feeding  [facility] Delivery   whole    Wound Care Documentation and Therapy:        Elimination:  Continence:   · Bowel: Yes  · Bladder: Yes  Urinary Catheter: None   Colostomy/Ileostomy/Ileal Conduit: No       Date of Last BM: prior to admission    Intake/Output Summary (Last 24 hours) at 2/20/2021 1346  Last data filed at 2/20/2021 0947  Gross per 24 hour   Intake 540 ml   Output 50 ml   Net 490 ml     I/O last 3 completed shifts: In: 360 [P.O.:60; I.V.:300]  Out: 48 [Urine:50]    Safety Concerns:     History of Falls (last 30 days) and At Risk for Falls    Impairments/Disabilities:      Vision and Hearing    Nutrition Therapy:  Current Nutrition Therapy:   - Oral Diet:  General    Routes of Feeding: Oral  Liquids: No Restrictions  Daily Fluid Restriction: no  Last Modified Barium Swallow with Video (Video Swallowing Test): not done    Treatments at the Time of Hospital Discharge:   Respiratory Treatments: ***  Oxygen Therapy:  is not on home oxygen therapy.   Ventilator:    - No ventilator support Rehab Therapies: Physical Therapy and Occupational Therapy  Weight Bearing Status/Restrictions: No weight bearing restirctions  Other Medical Equipment (for information only, NOT a DME order):  walker  Other Treatments: ***    Patient's personal belongings (please select all that are sent with patient):  Dentures upper and lower    RN SIGNATURE:  Electronically signed by Isha Mortensen RN on 2/20/21 at 5:21 PM EST    CASE MANAGEMENT/SOCIAL WORK SECTION    Inpatient Status Date: ***    Readmission Risk Assessment Score:  Readmission Risk              Risk of Unplanned Readmission:        15           Discharging to Facility/ Agency   · Name: [facility]  · Address:  · Phone:[phone]  · GQX:[phone]    Dialysis Facility (if applicable)   · Name:  · Address:  · Dialysis Schedule:  · Phone:  · Fax:    / signature: Electronically signed by Allison Oliver RN on 2/20/21 at 4:11 PM EST    PHYSICIAN SECTION    Prognosis: Good    Condition at Discharge: Stable    Rehab Potential (if transferring to Rehab): Good    Recommended Labs or Other Treatments After Discharge:   Home Care  PT/OT    Physician Certification: I certify the above information and transfer of Huma Jett  is necessary for the continuing treatment of the diagnosis listed and that he requires [address] for greater 30 days.      Update Admission H&P: No change in H&P    PHYSICIAN SIGNATURE:  Electronically signed by Mp Rizvi MD on 2/20/21 at 4:43 PM EST

## 2021-02-20 NOTE — CARE COORDINATION
CASE MANAGEMENT DISCHARGE SUMMARY      Discharge to: home with [facility] ordered/agency: na    Transportation:    Family/car: son      Confirmed discharge plan with: RN, [facility], son     Patient: yes     Family, name and contact number: Garret Irving      Facility/Agency, name:  [facility]     RN, name: Nancy    Note: Discharging nurse to complete JACKLYN, reconcile AVS, and place final copy with patient's discharge packet. RN to ensure that written prescriptions for  Level II medications are sent with patient to the facility as per protocol.

## 2021-02-20 NOTE — PROGRESS NOTES
Occupational Therapy   Occupational Therapy Initial Assessment and Treatment  Date: 2021   Patient Name: Mary Doshi  MRN: [id number]     : 1945    Date of Service: 2021    Discharge Recommendations:  Subacute/Skilled Nursing Facility     Assessment   Performance deficits / Impairments: Decreased functional mobility ; Decreased coordination;Decreased ADL status; Decreased balance;Decreased strength;Decreased high-level IADLs;Decreased safe awareness;Decreased cognition  Assessment: Pt minimally agreeable to OT evaluation. Pt currently presenting with the above performance deficits and functioning below baseline. Pt with decreased safety awareness, and agitated throughout session limiting session. Pt currently requiring Mod A x2 for functional mobility and transfers using a RW. Pt refusing ADLs this date. Pt will benefit from continued skilled OT while in house. Prognosis: Fair  Decision Making: High Complexity  OT Education: OT Role;Orientation;Plan of Care;Family Education; ADL Adaptive Strategies;Transfer Training;Energy Conservation  Patient Education: disease specific ed: importance of safe mobility, role of OT, POC, dc planning  Barriers to Learning: agitated, cognition  REQUIRES OT FOLLOW UP: Yes  Activity Tolerance  Activity Tolerance: Treatment limited secondary to agitation  Activity Tolerance: Agitated throughout session, limiting activities  Safety Devices  Safety Devices in place: Yes  Type of devices: Call light within reach;Nurse notified; Chair alarm in place;Gait belt;Left in chair         Patient Diagnosis(es): The primary encounter diagnosis was Gastrointestinal hemorrhage with hematemesis. A diagnosis of Melena was also pertinent to this visit. has a past medical history of Arthritis, CAD (coronary artery disease), COPD (chronic obstructive pulmonary disease) ([facility] [address] Diabetes mellitus ([facility] [address] Hyperlipidemia, and Hypertension.    has a past surgical history that includes Coronary LUE AROM (degrees)  LUE AROM : WFL  Left Hand AROM (degrees)  Left Hand AROM: WFL  RUE AROM (degrees)  RUE AROM : WFL  Right Hand AROM (degrees)  Right Hand AROM: WFL  LUE Strength  Gross LUE Strength: WFL  RUE Strength  Gross RUE Strength: WFL     Plan   Plan  Times per week: 3-4x. wk    AM-PAC Score     AM-PAC Inpatient Daily Activity Raw Score: 16 (02/20/21 1331)  AM-PAC Inpatient ADL T-Scale Score : 35.96 (02/20/21 1331)  ADL Inpatient CMS 0-100% Score: 53.32 (02/20/21 1331)  ADL Inpatient CMS G-Code Modifier : CK (02/20/21 1331)    Goals  Short term goals  Time Frame for Short term goals: within one week (2/27/21)  Short term goal 1: Pt demonstrates functional transfer with Mod I using LRAD  Short term goal 2: Pt demonstrates x2-3 min standing ADL using LRAD with Mod I  Short term goal 3: Pt demonstrates LB dressing with AE prn with Mod I  Short term goal 4: Pt demonstrates BUE AROM ther ex x10-15 reps in unsupported sitting position by 2/25  Patient Goals   Patient goals : go home     Therapy Time   Individual Concurrent Group Co-treatment   Time In 1232         Time Out 1305         Minutes 33         Timed Code Treatment Minutes: 23 Minutes(10 min eval)       Rick Lamb OTR/L    If pt is unable to be seen after this session, please let this note serve as discharge summary. Please see case management note for discharge disposition. Thank you.

## 2021-02-20 NOTE — PROGRESS NOTES
Care Coordination, Acute Rehab     After review, this patient is felt to be:      []   Appropriate for Acute Inpatient Rehab    []   Appropriate for Acute Inpatient Rehab Pending Insurance Authorization    [x]   Not appropriate for Acute Inpatient Rehab    []   Not appropriate at this time, however evaluation ongoing    []   Not appropriate due to insurance denial    Per therapy notes, patient cannot tolerate 3 hours of therapy at this time. Could not leave a voicemail to update .  Electronically signed by Annelise Cole RN on 2/20/2021 at 3:22 PM

## 2021-02-20 NOTE — FLOWSHEET NOTE
9634- Paged cross cover, \"FYI pt just had a 7 beat run of v tach. Vitals stable- HR63, /62. Pt denies any CP/SOB or other symptoms at this time. He already has labs ordered for this AM. Thanks!\"    7991- Paged cross cover regarding second run of v-tach (16 beats).

## 2021-02-20 NOTE — PROGRESS NOTES
Physical Therapy    Facility/Department: [facility] A2 CARD TELEMETRY  Initial Assessment and Treatment    NAME: Kailee Montes  : 1945  MRN: [id number]    Date of Service: 2021    Discharge Recommendations:  Subacute/Skilled Nursing Facility(Patient cannot tolerate 3+ hours of therapy daily.)   PT Equipment Recommendations  Equipment Needed: No  Other: defer to patient's facility. If pt is unable to be seen after this session, please let this note serve as discharge summary. Please see case management note for discharge disposition. Thank you. Barriers to home discharge:   [x] Steps to access home entry or bed/bath:   [x] Reported available assist at home upon discharge limited: Patient lives home alone   [x] Patient or family requests DC to other than home     Assessment   Body structures, Functions, Activity limitations: Decreased functional mobility ; Decreased ADL status; Decreased balance; Increased pain;Decreased posture;Decreased strength;Decreased safe awareness;Decreased cognition;Decreased endurance;Decreased high-level IADLs  Assessment: Patient is a 66year old male who was admitted to [facility] on 21 with possible GI bleed. Patient is below his reported prior independent level of function. Patient's mobility was limited by his poor balance, impaired cognition and decreased strength. Patient today required moderate assistance x 2 for transfers and to ambulate with a rolling walker. Patient currently lives at home alone. Patient presented with the therapy deficits listed above. PT recommends that this patient receive skilled PT in the SNF setting, when medically stable, in order to address these deficits and to help him maximize his safety and independence with all functional mobility. Patient cannot tolerate 3+ hours of therapy daily. PT to continue to follow. Treatment Diagnosis: decreased independence with functional mobility.   Specific instructions for Next Treatment: progress mobility as tolerated  Prognosis: Good  Decision Making: Medium Complexity  PT Education: Goals; General Safety;Gait Training;PT Role;Disease Specific Education;Plan of Care; Functional Mobility Training;Precautions;Transfer Training; Injury Prevention;Equipment; Family Education;Pressure Relief;Home Exercise Program;Orientation  Patient Education: Patient educated on the benefits of out of bed mobility and the importance of completing his exercises. Patient will need education reinforcement. Barriers to Learning: impaired cognition. agitation  REQUIRES PT FOLLOW UP: Yes  Activity Tolerance  Activity Tolerance: Treatment limited secondary to agitation;Patient limited by fatigue;Patient limited by endurance; Patient limited by cognitive status  Activity Tolerance: Vitals: 153/73 70 BPM 96% room air. Patient's RN aware of patient's agitation. Patient Diagnosis(es): The primary encounter diagnosis was Gastrointestinal hemorrhage with hematemesis. A diagnosis of Melena was also pertinent to this visit. has a past medical history of Arthritis, CAD (coronary artery disease), COPD (chronic obstructive pulmonary disease) ([facility] [address] Diabetes mellitus ([facility] [address] Hyperlipidemia, and Hypertension. has a past surgical history that includes Coronary angioplasty with stent and femoral bypass (Bilateral). Restrictions  Restrictions/Precautions  Restrictions/Precautions: General Precautions, Fall Risk  Position Activity Restriction  Other position/activity restrictions: Up with assistance. Vision/Hearing        Subjective  General  Chart Reviewed: Yes  Patient assessed for rehabilitation services?: Yes  Response To Previous Treatment: Not applicable  Family / Caregiver Present: Yes(patient's son)  Referring Practitioner: Hope Cisse MD  Referral Date : 02/20/21  Follows Commands: Impaired  Other (Comment): increased time to follow simple commands  General Comment  Comments: Patient seated in chair upon entry of therapy staff. Subjective  Subjective: Patient agreed to participate. Pain Screening  Patient Currently in Pain: Denies  Vital Signs  Patient Currently in Pain: Denies       Orientation  Orientation  Overall Orientation Status: Impaired  Orientation Level: Oriented to person;Oriented to place; Disoriented to time;Disoriented to situation  Social/Functional History  Social/Functional History  Lives With: Alone(kids come check on him)  Type of Home: House  Home Layout: One level  Home Access: Stairs to enter with rails, Ramped entrance  Entrance Stairs - Number of Steps: 1 LAMBERTO  Bathroom Shower/Tub: Tub/Shower unit  Bathroom Toilet: Standard  Home Equipment: Standard walker, Cane  ADL Assistance: Needs assistance(assistance with balance for ADLs)  Homemaking Assistance: Needs assistance(kids help with cooking, cleaning, laundry)  Ambulation Assistance: Independent  Transfer Assistance: Independent  Active : Yes  Additional Comments: one fall in the past six months; pt questionable historian; social functional provided by son  Cognition   Cognition  Overall Cognitive Status: Exceptions  Arousal/Alertness: Delayed responses to stimuli  Following Commands: Follows one step commands consistently; Follows one step commands with increased time  Attention Span: Difficulty attending to directions; Difficulty dividing attention  Memory: Decreased recall of recent events  Safety Judgement: Decreased awareness of need for assistance;Decreased awareness of need for safety  Problem Solving: Assistance required to identify errors made;Assistance required to correct errors made;Assistance required to generate solutions;Assistance required to implement solutions;Decreased awareness of errors  Insights: Decreased awareness of deficits  Initiation: Requires cues for some  Sequencing: Requires cues for some  Cognition Comment: patient was impulsive and was agitated during session. Patient's RN aware.     Objective     Observation/Palpation  Posture: Sitting - Dynamic: Poor  Standing - Static: Poor  Standing - Dynamic: Poor(with rolling walker.)  Comments: patient had a complete loss of balance when completing shoulder flexion exercise at edge of bed. max assist for him to regain his balance. Exercises  Hip Flexion: 1 x 10  Knee Long Arc Quad: 1 x 10  Ankle Pumps: 1 x 10  Upper Extremity: see OT evaluation  Other exercises  Other exercises?: Yes  Other exercises 1: sit <> stand repeated 5 times. mod assist x 2 for each attempt. Plan   Plan  Times per week: 3-5/week  Plan weeks: 1 week 2/27/21  Specific instructions for Next Treatment: progress mobility as tolerated  Current Treatment Recommendations: Strengthening, Balance Training, Endurance Training, Patient/Caregiver Education & Training, Functional Mobility Training, Equipment Evaluation, Education, & procurement, Transfer Training, Gait Training, ADL/Self-care Training, Safety Education & Training, Home Exercise Program, Pain Management  Safety Devices  Type of devices: All fall risk precautions in place, Left in chair, Call light within reach, Nurse notified, Chair alarm in place, Gait belt, Patient at risk for falls    AM-PAC Score     AM-PAC Inpatient Mobility without Stair Climbing Raw Score : 10 (02/20/21 1327)  AM-PAC Inpatient without Stair Climbing T-Scale Score : 34.07 (02/20/21 1327)  Mobility Inpatient CMS 0-100% Score: 71.66 (02/20/21 1327)  Mobility Inpatient without Stair CMS G-Code Modifier : CL (02/20/21 1327)       Goals  Short term goals  Time Frame for Short term goals: 1 week 2/27/21  Short term goal 1: Supine <> sit with mod I  Short term goal 2: Sit <> stand with mod I  Short term goal 3: Bed <> chair with LRAD and mod I  Short term goal 4: Ambulate 50 feet with LRAD and mod I. Short term goal 5: Ascend 1 step with rail and mod I  Patient Goals   Patient goals : To get stronger. To walk further.        Therapy Time   Individual Concurrent Group Co-treatment   Time In 9988 Time Out 1305         Minutes 33         Timed Code Treatment Minutes: 23 Minutes(10 minute evaluation)       Magi Sweet, PT

## 2021-02-20 NOTE — DISCHARGE SUMMARY
Hospital Medicine Discharge Summary    Patient: Viktoria Rosenberg     Age: 66 y.o. Gender: male  : 1945   MRN: [id number]  Code Status: Full     Admit Date: 2021   Discharge Date: 2021    Disposition:  Home     Condition at Discharge: Stable    Primary Care Provider: Pato Gonzáles    Admitting Physician: Franklin Shelby MD  Discharge Physician: Franklin Shelby MD       Discharge Diagnoses: Active Hospital Problems    Diagnosis    GI bleeding [K92.2]    Pulmonary nodule [R91.1]    CAD (coronary artery disease) [I25.10]    Diabetes mellitus ([facility] [address] [E11.9]    Hypertension [I10]    COPD (chronic obstructive pulmonary disease) ([facility] [address] [J44.9]       Hospital Course:     66 y.o. male who presented to [facility] with hematemsis. PMHx significant for COPD, CAD, DM2, HTN. He presented with 1 day history of N/V and hematemesis. He reports the vomiting started first and later developed coffee ground type emesis. Also noted dark stool. No prior history of GI bleed. He takes ASA and Plavix for CAD. Found to have stable Hb. Assessment/Plan:    GI bleeding: Concern for upper GI bleeding. Hb stable. Bleeding has resolved. GI consulted. EGD unremarkable for active bleeding; cannot rule out small Tracey-Clara tear that wasn't visualized. Clinical history suggestive of MW tear as vomiting episodes preceded bleeding. Continue PO Protonix. Hb stable. No further bleeding. Pulmonary nodule: Possible 2 cm right perihilar nodule in former smoker. Recommended CT chest for further evaluation. This demonstrated at 2 cm RUL pulmonary nodule and some pleural plaques over the left base consistent with prior asbestos exposure. The official radiology report states \"7 cm\" although imaging personally reviewed and shows nodule to be only 2 cm. We discussed the possibility of lung malignancy and referral was given to [facility] to discuss biopsy as an outpatient.       Diabetes Mellitus, Type 2: Controlled. Resume home oral regimen. Hypertension, benign: Controlled. Continue current medication regimen. COPD (chronic obstructive pulmonary disease): Stable. Continue bronchodilators. CAD (coronary artery disease): Remote stent. OK to resume ASA and Plavix. Exam:   BP (!) 155/81   Pulse 67   Temp 97.9 °F (36.6 °C) (Oral)   Resp 17   Ht 6' (1.829 m)   Wt 156 lb 14.4 oz (71.2 kg)   SpO2 96%   BMI 21.28 kg/m²     General appearance: No apparent distress, appears stated age and cooperative. HEENT:  Normal cephalic, atraumatic without obvious deformity. Pupils equal, round, and reactive to light. Extra ocular muscles intact. Conjunctivae/corneas clear. Neck: Supple, no jugular venous distention. Trachea midline with full range of motion. Respiratory:  Normal respiratory effort. Clear to auscultation, bilaterally without Rales/Wheezes/Rhonchi. Cardiovascular: Regular rate and rhythm with normal S1/S2 without murmurs, rubs or gallops. Abdomen: Soft, non-tender, non-distended with normal bowel sounds. Musculoskelatal: No clubbing, cyanosis or edema bilaterally. Full range of motion without deformity. Neurologic:  Neurovascularly intact without any focal sensory/motor deficits. Cranial nerves: II-XII intact, grossly non-focal.  Psychiatric: Alert and oriented, thought content appropriate, normal insight  Skin: Skin color, texture, turgor normal.  No rashes or lesions. Capillary Refill: Brisk,< 3 seconds   Peripheral Pulses: +2 palpable, equal bilaterally       Patient Discharge Instructions: Follow up:  1. Primary Care Provider Bang Juarez in the next 1-2 weeks.   [id number] [facility] to discuss possible lung biopsy [phone]    Discharge Medications:   Discharge Medication List as of 2/20/2021  5:22 PM        Discharge Medication List as of 2/20/2021  5:22 PM        Discharge Medication List as of 2/20/2021  5:22 PM      CONTINUE these medications which have NOT CHANGED Details   albuterol sulfate HFA (PROAIR HFA) 108 (90 Base) MCG/ACT inhaler Inhale 2 puffs into the lungs every 6 hours as needed for Wheezing, Disp-1 Inhaler, R-3Print      acetaminophen (TYLENOL) 325 MG tablet Take 2 tablets by mouth every 4 hours as needed for Pain or Fever Maximum dose of acetaminophen is 4000 mg from all sources in 24 hours. , Disp-120 tablet, R-3OTC      ipratropium-albuterol (DUONEB) 0.5-2.5 (3) MG/3ML SOLN nebulizer solution Inhale 3 mLs into the lungs 3 times daily, Disp-360 mL, R-0Print      mometasone-formoterol (DULERA) 200-5 MCG/ACT inhaler Inhale 2 puffs into the lungs 2 times daily Substituted for Budesonide-Formoterol (SYMBICORT). , Disp-1 Inhaler, R-0Print      atorvastatin (LIPITOR) 80 MG tablet Take 1 tablet by mouth daily, Disp-30 tablet, R-0Print      amLODIPine (NORVASC) 10 MG tablet Take 0.5 tablets by mouth nightly Note: new lower dose, Disp-30 tablet, R-3Print      furosemide (LASIX) 40 MG tablet Take 1 tablet by mouth daily, Disp-60 tablet, R-0Normal      trospium (SANCTURA) 60 MG CP24 extended release capsule Take 1 capsule by mouth daily, Disp-30 capsule, R-0Print      insulin glargine (LANTUS SOLOSTAR) 100 UNIT/ML injection pen Inject 20 Units into the skin nightly, Disp-5 Pen, R-3Print      insulin lispro (HUMALOG KWIKPEN) 100 UNIT/ML pen Inject 0-6 Units into the skin 3 times daily (before meals) Low Dose Correction Algorithm: BS  No Insulin, -199 1 Unit, 200-249 2 Units, 250-299 3 Units, 300-349 4 Units, 350-399 5 Units, 400 and above give 6 Units, Disp-5 Pen, R-3Print      carvedilol (COREG) 6.25 MG tablet Take 6.25 mg by mouth 2 times dailyHistorical Med      aspirin 81 MG chewable tablet Take 81 mg by mouth daily      lisinopril (PRINIVIL;ZESTRIL) 40 MG tablet Take 40 mg by mouth daily      pantoprazole sodium (PROTONIX) 40 MG PACK packet Take 40 mg by mouth every morning (before breakfast)      SITagliptin-MetFORMIN HCl ER (JANUMET XR) 100-1000 MG TB24 Take 1 tablet by mouth daily      clopidogrel (PLAVIX) 75 MG tablet Take 75 mg by mouth daily           Discharge Medication List as of 2/20/2021  5:22 PM            Significant Test Results    Xr Hip Left (2-3 Views)    Result Date: 3/7/2021  EXAMINATION: TWO XRAY VIEWS OF THE LEFT HIP 3/5/2021 5:45 pm COMPARISON: 3/4/2021 HISTORY: ORDERING SYSTEM PROVIDED HISTORY: post op IMN left hip fx TECHNOLOGIST PROVIDED HISTORY: Of operative side while in recovery room. Reason for exam:->post op IMN left hip fx Reason for Exam: post op left hip surgery FINDINGS: Perioperative examination of the left hip has been obtained in this patient status post open reduction and internal fixation of intertrochanteric fracture of the left proximal femur. Short intramedullary herb has been placed across the fracture. Distal aspect of the herb is transfixed by 2 transversely oriented cortical screws. Proximal aspect of the herb is transfixed by a cannulated orthopedic pin with threaded tip. Tip of the pin is visualized in the region of the medial femoral head. There has been improvement of the alignment of the fracture following fixation. Air identified about the hip related to the procedure. Endovascular stents again identified about the pelvis. Status post ORIF of intertrochanteric fracture of left proximal femur. RECOMMENDATION: Intraprocedural fluoroscopic spot images as above. See separate procedure report for more information. Ct Chest Wo Contrast    Result Date: 2/19/2021  EXAMINATION: CT OF THE CHEST WITHOUT CONTRAST 2/19/2021 6:21 pm TECHNIQUE: CT of the chest was performed without the administration of intravenous contrast. Multiplanar reformatted images are provided for review. Dose modulation, iterative reconstruction, and/or weight based adjustment of the mA/kV was utilized to reduce the radiation dose to as low as reasonably achievable. COMPARISON: None.  HISTORY: ORDERING SYSTEM PROVIDED HISTORY: Pulmonary nodule TECHNOLOGIST PROVIDED HISTORY: Reason for exam:->Pulmonary nodule Reason for Exam: pulmonary nodule; sob Acuity: Acute Type of Exam: Initial Relevant Medical/Surgical History: diabetic, copd, cad FINDINGS: Lungs/pleura: The central airways are patent. Is a 7 cm right upper lobe for spine abnormalities seen on previous chest x-ray. No additional pulmonary nodules masses are identified. There are calcified pleural plaques at the left base consistent prior asbestos exposure. 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 Xr Chest Portable    Result Date: 2/19/2021  EXAMINATION: ONE XRAY VIEW OF THE CHEST 2/19/2021 9:10 am COMPARISON: 04/09/2018 HISTORY: ORDERING SYSTEM PROVIDED HISTORY: gi bleeding TECHNOLOGIST PROVIDED HISTORY: Reason for exam:->gi bleeding Reason for Exam: gi bleed Acuity: Acute Type of Exam: Initial FINDINGS: The lungs are hyperexpanded with chronic pleuroparenchymal scarring noted in the periphery of the left mid lung. There is a faint 2 cm nodular asymmetry within the right perihilar region. There is no acute lobar consolidation, pneumothorax or effusion. Heart size and vascularity are stable. Indeterminate right perihilar nodule. CT of the chest recommended for further evaluation. Fluoro For Surgical Procedures    Result Date: 3/5/2021  EXAMINATION: SPOT FLUOROSCOPIC IMAGES 3/5/2021 2:47 pm TECHNIQUE: Fluoroscopy was provided by the radiology department for procedure. Radiologist was not present during examination.  FLUOROSCOPY DOSE AND TYPE OR TIME AND EXPOSURES: 1 minutes 57 seconds fluoroscopy 8 spot films COMPARISON: None HISTORY: ORDERING SYSTEM PROVIDED HISTORY: Lt. hip/proximal femur surgery TECHNOLOGIST PROVIDED HISTORY: Reason for exam:->Lt. hip/proximal femur surgery Reason for Exam: Lt. hip/proximal femur surgery Acuity: Acute Type of Exam: Initial Intraprocedural imaging. FINDINGS: 8 spot images of the hip were obtained. Images obtained show reduction and stabilization of femur fracture by intramedullary nail. There is atherosclerosis. Intraprocedural fluoroscopic spot images as above. See separate procedure report for more information. Xr Hip 1 Vw W Pelvis Left    Result Date: 3/4/2021  EXAMINATION: ONE XRAY VIEW OF THE PELVIS AND TWO XRAY VIEWS LEFT HIP 3/4/2021 12:03 pm COMPARISON: None. HISTORY: ORDERING SYSTEM PROVIDED HISTORY: fall pain TECHNOLOGIST PROVIDED HISTORY: Reason for exam:->fall pain Reason for Exam: fall Acuity: Acute Type of Exam: Initial FINDINGS: There is a minimally displaced intertrochanteric left hip fracture. The femoral head remains well located. Nondisplaced intertrochanteric fracture of the proximal left femur. Consults:     IP CONSULT TO GI  IP CONSULT TO HOSPITALIST  IP CONSULT TO HOME CARE NEEDS    Labs: For convenience and continuity at follow-up the following most recent labs are provided:    Lab Results   Component Value Date    WBC 6.3 03/09/2021    HGB 12.0 03/09/2021    HCT 35.2 03/09/2021    MCV 87.6 03/09/2021     03/09/2021     03/08/2021    K 3.0 03/08/2021    K 3.5 03/06/2021     03/08/2021    CO2 32 03/08/2021    BUN 20 03/08/2021    CREATININE 1.1 03/08/2021    CALCIUM 8.4 03/08/2021    TROPONINI 0.01 03/04/2021    ALKPHOS 128 03/06/2021    ALT 7 03/06/2021    AST 11 03/06/2021    BILITOT 0.4 03/06/2021    LABALBU 2.6 03/06/2021    LABA1C 5.9 02/19/2021     Lab Results   Component Value Date    INR 1.07 03/04/2021    INR 1.09 02/19/2021    INR 1.14 10/13/2017         The patient was seen and examined on day of discharge and this discharge summary is in conjunction with any daily progress note from day of discharge.  Time spent on discharge is more than 30 minutes in the examination, evaluation, counseling and review of medications and discharge plan. Signed:    Yessi Nesbitt MD   3/17/2021    Thank you Renny Perez for the opportunity to be involved in this patient's care. If you have any questions or concerns please feel free to contact my office [phone].

## 2021-02-20 NOTE — CARE COORDINATION
CM spoke with the ARU who said patient can not tolerate 3hrs of therapy and they will not accept at this time. CM then spoke with patients son, Yovani Ramirez, who refuses to go to SNF. At this time they have decided that patient will return to home with Ramos Betts. Patient and son have no agency preference. Referral made to [facility].

## 2021-02-20 NOTE — PROGRESS NOTES
Pt d/c'd with [facility]. Remove IV, catheter intact. Pt tolerated well. No redness noted at site. Notified [facility] and removed tele box. Reviewed d/c instructions, home meds, and  f/u information utilizing teach-back method. Patient verbalized understanding. Pt wheeled to main entrance with belongings, son at side.

## 2021-03-04 PROBLEM — S72.145A CLOSED NONDISPLACED INTERTROCHANTERIC FRACTURE OF LEFT FEMUR (HCC): Status: ACTIVE | Noted: 2021-01-01

## 2021-03-04 PROBLEM — S72.142K: Status: ACTIVE | Noted: 2021-01-01

## 2021-03-04 NOTE — CARE COORDINATION
[facility]      Patient is active with [facility]. I will follow for DC needs.             Ryder Fuller  Work mobile: [phone]  [facility] office: [phone]

## 2021-03-04 NOTE — ED NOTES
1259- Ortho was perfect served regarding Fx.    [facility] returned call   Hoyt Spatz  03/04/21 [facility]  03/04/21 [id number]

## 2021-03-04 NOTE — ED PROVIDER NOTES
I independently examined and evaluated Makeda Godoy. In brief, patient is a 23-year-old male who presents with concerns for mechanical fall while he was try to get out of his truck and subsequent left hip pain. Denies any head trauma or loss of consciousness. He denies any presyncopal or syncopal episodes. Focused exam revealed tenderness palpation of the left hip, left lower extremity is neurovascularly intact with soft compartments. XR HIP 1 VW W PELVIS LEFT   Final Result   Nondisplaced intertrochanteric fracture of the proximal left femur. ED course: Patient is a 23-year-old male, presenting with concerns for left hip pain after falling when trying to get out of his truck. HPI as detailed above. Upon arrival in the ED, vitals remarkable for hypertension but otherwise reassuring. Patient was seen in conjunction with the midlevel practitioner, please refer to her note for further details of the patient's work-up and treatment. X-ray revealed a nondisplaced intertrochanteric fracture of the left femur. Patient will be hospitalized for further work-up and treatment of his condition. All diagnostic, treatment, and disposition decisions were made by myself in conjunction with the advanced practice provider. For all further details of the patient's emergency department visit, please see the advanced practice provider's documentation. 1. Closed nondisplaced intertrochanteric fracture of left femur, initial encounter ([facility] [address]    2. Fall, initial encounter      The Ekg interpreted by me shows  normal sinus rhythm with a rate of 80  Axis is   Normal  QTc is  normal  Intervals and Durations are unremarkable.       ST Segments: nonspecific changes  T wave flattening increased slightly since previous performed 2/19/2021      Comment: Please note this report has been produced using speech recognition software and may contain errors related to that system including errors in grammar, punctuation, and spelling, as well as words and phrases that may be inappropriate. If there are any questions or concerns please feel free to contact the dictating provider for clarification.        Олег Fleming MD  03/04/21 [id number]

## 2021-03-04 NOTE — ED TRIAGE NOTES
Chief Complaint   Patient presents with   Kashmir Kwong     EMS states pt fell while trying to get out of truck, landed on left hip, c/o severe pain in left hip, per EMS no shortening or deformity noted, distal pulse present

## 2021-03-04 NOTE — ED PROVIDER NOTES
[address] ED  EMERGENCY DEPARTMENT ENCOUNTER        Pt Name: Ian Hunt  MRN: [id number]  Armstrongfjennifer 1945  Dateof evaluation: 3/4/2021  Provider: JEN Wheeler CNP  PCP: Cherylene Ege  ED Attending: No att. providers found    CHIEF COMPLAINT       Chief Complaint   Patient presents with    Fall     EMS states pt fell while trying to get out of truck, landed on left hip, c/o severe pain in left hip, per EMS no shortening or deformity noted, distal pulse present       HISTORY OF PRESENTILLNESS   (Location/Symptom, Timing/Onset, Context/Setting, Quality, Duration, Modifying Factors, Severity)  Note limiting factors. Ian Hunt is a 66 y.o. male for left hip pain. Onset was today. Context includes pt states he stepped on his slipper and fell onto his left hip. He denies hitting his head or any loc. Pt having left hip pain. Alleviating factors include nothing. Aggravating factors include nothing. Pain is 0/10 unless moving. nothing has been used for pain today. Declined wanting any pain meds currently. Nursing Notes were all reviewed and agreed with or any disagreements were addressed  in the HPI. REVIEW OF SYSTEMS    (2-9 systems for level 4, 10 or more for level 5)     Review of Systems   Constitutional: Negative for fever. Fall   HENT: Negative for congestion, rhinorrhea and sore throat. Respiratory: Negative for shortness of breath. Cardiovascular: Negative for chest pain. Gastrointestinal: Negative for abdominal pain. Genitourinary: Negative for decreased urine volume and difficulty urinating. Musculoskeletal: Negative for arthralgias and myalgias. Left hip pain   Skin: Negative for color change and rash. Neurological: Negative for dizziness and light-headedness. Psychiatric/Behavioral: Negative for agitation. All other systems reviewed and are negative. Positives and Pertinent negatives as per HPI.   Except as noted above in the ROS, all other systems were reviewed and negative. PAST MEDICAL HISTORY     Past Medical History:   Diagnosis Date    Arthritis     CAD (coronary artery disease)     [facility]     Diabetes mellitus ([facility]     Hyperlipidemia     Hypertension          SURGICAL HISTORY       Past Surgical History:   Procedure Laterality Date    CORONARY ANGIOPLASTY WITH STENT PLACEMENT      FEMORAL BYPASS Bilateral     UPPER GASTROINTESTINAL ENDOSCOPY N/A 2/19/2021    EGD CONTROL HEMORRHAGE performed by Stefany Vick MD at [address]       Previous Medications    ACETAMINOPHEN (TYLENOL) 325 MG TABLET    Take 2 tablets by mouth every 4 hours as needed for Pain or Fever Maximum dose of acetaminophen is 4000 mg from all sources in 24 hours. ALBUTEROL SULFATE HFA (PROAIR HFA) 108 (90 BASE) MCG/ACT INHALER    Inhale 2 puffs into the lungs every 6 hours as needed for Wheezing    ALBUTEROL SULFATE HFA (PROVENTIL HFA) 108 (90 BASE) MCG/ACT INHALER    Inhale 2 puffs into the lungs every 4 hours as needed for Wheezing or Shortness of Breath With spacer (and mask if indicated). Thanks.     AMLODIPINE (NORVASC) 10 MG TABLET    Take 0.5 tablets by mouth nightly Note: new lower dose    ASPIRIN 81 MG CHEWABLE TABLET    Take 81 mg by mouth daily    ATORVASTATIN (LIPITOR) 80 MG TABLET    Take 1 tablet by mouth daily    CARVEDILOL (COREG) 6.25 MG TABLET    Take 6.25 mg by mouth 2 times daily    CLOPIDOGREL (PLAVIX) 75 MG TABLET    Take 75 mg by mouth daily    FUROSEMIDE (LASIX) 40 MG TABLET    Take 1 tablet by mouth daily    INSULIN GLARGINE (LANTUS SOLOSTAR) 100 UNIT/ML INJECTION PEN    Inject 20 Units into the skin nightly    INSULIN LISPRO (HUMALOG KWIKPEN) 100 UNIT/ML PEN    Inject 0-6 Units into the skin 3 times daily (before meals) Low Dose Correction Algorithm: BS  No Insulin, -199 1 Unit, 200-249 2 Units, 250-299 3 Units, 300-349 4 Units, 350-399 5 Units, 400 and above give 6 Units    IPRATROPIUM-ALBUTEROL (DUONEB) 0.5-2.5 (3) MG/3ML SOLN NEBULIZER SOLUTION    Inhale 3 mLs into the lungs 3 times daily    LISINOPRIL (PRINIVIL;ZESTRIL) 40 MG TABLET    Take 40 mg by mouth daily    MOMETASONE-FORMOTEROL (DULERA) 200-5 MCG/ACT INHALER    Inhale 2 puffs into the lungs 2 times daily Substituted for Budesonide-Formoterol (SYMBICORT). PANTOPRAZOLE SODIUM (PROTONIX) 40 MG PACK PACKET    Take 40 mg by mouth every morning (before breakfast)    SITAGLIPTIN-METFORMIN HCL ER (JANUMET XR) 100-1000 MG TB24    Take 1 tablet by mouth daily    TROSPIUM (SANCTURA) 60 MG CP24 EXTENDED RELEASE CAPSULE    Take 1 capsule by mouth daily         ALLERGIES     No known allergies    FAMILY HISTORY     History reviewed. No pertinent family history. SOCIAL HISTORY       Social History     Socioeconomic History    Marital status:       Spouse name: None    Number of children: None    Years of education: None    Highest education level: None   Occupational History    None   Social Needs    Financial resource strain: None    Food insecurity     Worry: None     Inability: None    Transportation needs     Medical: None     Non-medical: None   Tobacco Use    Smoking status: Former Smoker     Packs/day: 1.50     Types: Cigarettes     Quit date: 7/23/2017     Years since quitting: 3.6    Smokeless tobacco: Never Used   Substance and Sexual Activity    Alcohol use: No    Drug use: No    Sexual activity: Never   Lifestyle    Physical activity     Days per week: None     Minutes per session: None    Stress: None   Relationships    Social connections     Talks on phone: None     Gets together: None     Attends Alevism service: None     Active member of club or organization: None     Attends meetings of clubs or organizations: None     Relationship status: None    Intimate partner violence     Fear of current or ex partner: None Emotionally abused: None     Physically abused: None     Forced sexual activity: None   Other Topics Concern    None   Social History Narrative    None       SCREENINGS    Delmar Coma Scale  Eye Opening: Spontaneous  Best Verbal Response: Oriented  Best Motor Response: Obeys commands  Delmar Coma Scale Score: 15        PHYSICAL EXAM  (up to 7 for level 4, 8 or more for level 5)     ED Triage Vitals [03/04/21 1142]   BP Temp Temp Source Pulse Resp SpO2 Height Weight   (!) 179/70 98.3 °F (36.8 °C) Oral 78 16 95 % 6' (1.829 m) 167 lb (75.8 kg)       Physical Exam  Constitutional:       Appearance: He is well-developed. HENT:      Head: Normocephalic and atraumatic. Neck:      Musculoskeletal: Normal range of motion. Cardiovascular:      Rate and Rhythm: Normal rate. Pulmonary:      Effort: Pulmonary effort is normal. No respiratory distress. Abdominal:      General: There is no distension. Palpations: Abdomen is soft. Tenderness: There is no abdominal tenderness. Musculoskeletal:         General: Tenderness, deformity and signs of injury present. No swelling. Comments: Decreased range of motion of the left hip due to pain elicited with movement. Sensation and pulse intact to left foot. Left leg appears slightly shorter than the right. Skin:     General: Skin is warm and dry. Neurological:      Mental Status: He is alert and oriented to person, place, and time.          DIAGNOSTIC RESULTS   LABS:    Labs Reviewed   CBC WITH AUTO DIFFERENTIAL - Abnormal; Notable for the following components:       Result Value    WBC 14.0 (*)     Neutrophils Absolute 12.3 (*)     Lymphocytes Absolute 0.7 (*)     All other components within normal limits    Narrative:     Performed at:  [facility]) - [facility],  [place], [facility]   Phone [phone]   COMPREHENSIVE METABOLIC PANEL W/ REFLEX TO MG FOR LOW K - Abnormal; Notable for the following components: following medications:  Medications   morphine (PF) injection 2 mg (2 mg Intravenous Given 3/4/21 1532)   potassium chloride (KLOR-CON M) extended release tablet 10 mEq (has no administration in time range)   morphine 2 MG/ML injection (has no administration in time range)     Patient was seen and evaluated by myself and . Patient here today for complaints of left hip pain. Patient states that he stepped on his slipper today and fell onto his left hip. He denies hitting his head or any other injury. He did not have any loss of consciousness. On exam the patient is awake and alert hard of hearing but otherwise hemodynamically stable nontoxic in appearance. Left hip x-ray was concerning for nondisplaced intertrochanteric fracture of the proximal left femur. Patient is neurovascular intact to his left leg. Patient states that he currently has no pain as long as he is not moving and denied wanting any pain medications. Consult was placed to the orthopedist and they were notified of the patient's admission. They requested him to be admitted to medicine. Consult was placed to the hospitalist for admission. Hospitalist is accepted in the patient's care was transferred to the inpatient unit. The patient tolerated their visit well. I have evaluated this patient. My supervising physician was available for consultation. The patient and / or the family were informed of the results of any tests, a time was given to answer questions, a plan was proposed and they agreed with plan. FINAL IMPRESSION      1. Closed nondisplaced intertrochanteric fracture of left femur, initial [facility]    2. Fall, initial encounter          DISPOSITION/PLAN   DISPOSITION Admitted 03/04/2021 02:24:04 PM      PATIENT REFERRED TO:  No follow-up provider specified.     DISCHARGE MEDICATIONS:  New Prescriptions    No medications on file       DISCONTINUED MEDICATIONS:  Discontinued Medications    No medications on file              (Please note that portions of this note were completed with a voice recognition program.  Efforts were made to edit the dictations but occasionally words are mis-transcribed.)    JEN Camacho CNP (electronically signed)         JEN Camacho CNP  03/04/21 5630

## 2021-03-04 NOTE — H&P
Hospital Medicine History & Physical      PCP: Gregory Haro    Date of Admission: 3/4/2021    Date of Service: Pt seen/examined on 3/4/2021    Chief Complaint:    Chief Complaint   Patient presents with   [place] Cluster     EMS states pt fell while trying to get out of truck, landed on left hip, c/o severe pain in left hip, per EMS no shortening or deformity noted, distal pulse present         History Of Present Illness: The patient is a 66 y.o. male with a PMH of CAD s/p remote stent, COPD, Type II DM, HLD, HTN who presented to [facility] ED with complaint of mechanical fall while trying to get out of his truck. Reports his foot landed in some mud and he got stuck and fell on his left side. He was unable to ambulate following the fall. Reported severe left hip pain. Denied any chest pain, dizziness, SOB or lightheadedness prior to the fall. Did not hit his head. No loss of consciousness. Denies numbness or tingling to BLEs. Hx does have a hx of CAD s/p stent. He does take ASA and Plavix. No hx of VTE that pt is aware of. He is a current smoker. Denies active chest pain or SOB. Denies any knowledge of issues with prior anesthesia. Past Medical History:        Diagnosis Date    Arthritis     CAD (coronary artery disease)     [facility]ch[facility]) [facility]     Diabetes mellitus ([facility]     Hyperlipidemia     Hypertension        Past Surgical History:        Procedure Laterality Date    CORONARY ANGIOPLASTY WITH STENT PLACEMENT      FEMORAL BYPASS Bilateral     UPPER GASTROINTESTINAL ENDOSCOPY N/A 2/19/2021    EGD CONTROL HEMORRHAGE performed by Franki Orosco MD at [address] [place] [address]       Medications Prior to Admission:    Prior to Admission medications    Medication Sig Start Date End Date Taking?  Authorizing Provider   albuterol sulfate HFA (PROAIR HFA) 108 (90 Base) MCG/ACT inhaler Inhale 2 puffs into the lungs every 6 hours as needed for Wheezing 4/9/18   Cherrie Donnelly MD acetaminophen (TYLENOL) 325 MG tablet Take 2 tablets by mouth every 4 hours as needed for Pain or Fever Maximum dose of acetaminophen is 4000 mg from all sources in 24 hours. 10/3/17   Reese Roberts MD   ipratropium-albuterol (DUONEB) 0.5-2.5 (3) MG/3ML SOLN nebulizer solution Inhale 3 mLs into the lungs 3 times daily 10/3/17   Reese Roberts MD   mometasone-formoterol [facility]) 200-5 MCG/ACT inhaler Inhale 2 puffs into the lungs 2 times daily Substituted for Budesonide-Formoterol (SYMBICORT).  10/3/17   Reese Roberts MD   atorvastatin (LIPITOR) 80 MG tablet Take 1 tablet by mouth daily 10/3/17   Reese Roberts MD   amLODIPine (NORVASC) 10 MG tablet Take 0.5 tablets by mouth nightly Note: new lower dose 10/3/17   Reese Roberts MD   furosemide (LASIX) 40 MG tablet Take 1 tablet by mouth daily 10/3/17   Reese Roberts MD   [facility]) 60 MG CP24 extended release capsule Take 1 capsule by mouth daily 10/3/17   Reese Roberts MD   insulin glargine (LANTUS SOLOSTAR) 100 UNIT/ML injection pen Inject 20 Units into the skin nightly 10/3/17   Reese Roberts MD   insulin lispro (HUMALOG KWIKPEN) 100 UNIT/ML pen Inject 0-6 Units into the skin 3 times daily (before meals) Low Dose Correction Algorithm: BS  No Insulin, -199 1 Unit, 200-249 2 Units, 250-299 3 Units, 300-349 4 Units, 350-399 5 Units, 400 and above give 6 Units 10/3/17   Reese Roberts MD   carvedilol (COREG) 6.25 MG tablet Take 6.25 mg by mouth 2 times daily    Historical Provider, MD   aspirin 81 MG chewable tablet Take 81 mg by mouth daily    Historical Provider, MD   lisinopril (PRINIVIL;ZESTRIL) 40 MG tablet Take 40 mg by mouth daily    Historical Provider, MD   pantoprazole sodium (PROTONIX) 40 MG PACK packet Take 40 mg by mouth every morning (before breakfast)    Historical Provider, MD   SITagliptin-MetFORMIN HCl ER (JANUMET XR) 100-1000 MG TB24 Take 1 tablet by mouth daily    Historical Provider, MD   albuterol sulfate HFA (PROVENTIL HFA) 108 (90 BASE) MCG/ACT inhaler Inhale 2 puffs into the lungs every 4 hours as needed for Wheezing or Shortness of Breath With spacer (and mask if indicated). Thanks. 12/24/16 3/29/17  Jacquline Dawn A. Darylene Polka, MD   clopidogrel (PLAVIX) 75 MG tablet Take 75 mg by mouth daily    Historical Provider, MD       Allergies:  No known allergies    Social History:  The patient currently lives at home. TOBACCO:   reports that he quit smoking about 3 years ago. His smoking use included cigarettes. He smoked 1.50 packs per day. He has never used smokeless tobacco.  ETOH:   reports no history of alcohol use. Family History:   Positive as follows:    History reviewed. No pertinent family history. REVIEW OF SYSTEMS:     Constitutional: Negative for fever   HENT: Negative for sore throat   Eyes: Negative for redness   Respiratory: Negative  for dyspnea, cough   Cardiovascular: Negative for chest pain   Gastrointestinal: Negative for vomiting, diarrhea   Genitourinary: Negative for hematuria   Musculoskeletal: Negative for arthralgias   Skin: Negative for rash   Neurological: Negative for syncope   Hematological: Negative for adenopathy   Psychiatric/Behavorial: Negative for anxiety    PHYSICAL EXAM:    BP (!) 179/70   Pulse 78   Temp 98.3 °F (36.8 °C) (Oral)   Resp 16   Ht 6' (1.829 m)   Wt 167 lb (75.8 kg)   SpO2 95%   BMI 22.65 kg/m²   Gen: No distress. Alert. Elderly  male, mildly [place]  Eyes: No sclera icterus. No conjunctival injection. Neck: Trachea midline. Resp: No accessory muscle use. No crackles. No wheezes. No rhonchi. On RA   CV: Regular rate. Regular rhythm. No murmur. No rub. No edema. GI: Soft, Non-tender. Non-distended. No masses. No organomegaly. Normal bowel sounds. No hernia. Skin: Warm and dry. No rash on exposed extremities. M/S: Left hip TTP, does not appear shortened or externally rotated, able to flex and extend toes  Neuro: Awake.  Grossly nonfocal, follows commands, moves all extremities - as able (limited 2/2 left hip pain), sensation intact to bilateral lower extremities    Psych: No anxiety or agitation. CBC:   Recent Labs     03/04/21  1420   WBC 14.0*   HGB 13.6   HCT 41.6   MCV 90.1        BMP:   Recent Labs     03/04/21  1420      K 3.4*      CO2 28   BUN 17   CREATININE 1.0     LIVER PROFILE:   Recent Labs     03/04/21  1420   AST 13*   ALT 12   BILITOT 0.6   ALKPHOS 174*     PT/INR:   Recent Labs     03/04/21  1420   PROTIME 12.4   INR 1.07     U/A:    Lab Results   Component Value Date    COLORU Yellow 10/14/2017    WBCUA  10/14/2017    RBCUA 3-5 10/14/2017    MUCUS 1+ 10/01/2017    BACTERIA 4+ 10/14/2017    CLARITYU CLOUDY 10/14/2017    SPECGRAV 1.020 10/14/2017    LEUKOCYTESUR TRACE 10/14/2017    BLOODU LARGE 10/14/2017    GLUCOSEU Negative 10/14/2017    AMORPHOUS 1+ 10/01/2017     CULTURES  None    EKG:     EKG ordered for pre-op    RADIOLOGY    XR HIP 1 VW W PELVIS LEFT   Final Result   Nondisplaced intertrochanteric fracture of the proximal left femur. Pertinent previous results reviewed     TTE 9/2017  Summary:  Overall left ventricular ejection fraction is estimated to be 45-50%. The left ventricular function is mildly reduced. The anteroseptum wall is mildly hypokinetic. The diastolic function is impaired and classified as Grade 1  (impaired relaxation). The left atrium is moderately dilated. Mild tricuspid regurgitation is present. There is no obvious cardiac source of emboli noted.     ASSESSMENT/PLAN:    Left Femur Fracture  Mechanical Fall  - XR left hip/pelvis: non-displaced intertrochanteric fx of proximal left femur  - Admit to Med Surg  - SQ Lovenox, PRN pain meds, hold ASA & Plavix  - ortho consult - NPO for now  - check EKG for pre-op clearance    Hypokalemia  - mild, 3.4  - replete and repeat BMP tomorrow    Leukocytosis  - 14  - favor reactive, no concerns for infection  - repeat CBC tomorrow    Type II DM  - controlled  - cont Lantus 20 u nightly, add low dose SSI; hold Janumet  - POC Glucose, monitor    HTN  - uncontrolled, likely 2/2 pain  - cont Norvasc, Coreg Lasix, PRN pain meds for pain control    CAD  - s/p remote stent  - cont Coreg, Lisinopril, Lipitor; hold ASA & Plavix in monet-operative setting    Chronic Combined CHF  - appears compensated  - last echo 9/2017 - EF 45-50%, grade I DD  - cont Coreg, Lisinopril and Lasix    COPD  - no AE  - cont [address] Duonebs, Dulera    GERD  - cont Protonix    RAPID COVID pending    Recent Admission to [facility]   - on 2/19 for hematemesis  - underwent EGD which was unremarkable for active bleeding, unable to r/o small Tracey-Clara tear    DVT Prophylaxis: Lovenox  Diet: No diet orders on file  Code Status: Prior    Janet Chang PA-C 3:35 PM 3/4/2021

## 2021-03-04 NOTE — ED NOTES
Bed: TR  Expected date:   Expected time:   Means of arrival:   Comments:     Tavo Costa RN  03/04/21 9550

## 2021-03-04 NOTE — ED NOTES
[facility] was perfect served regarding admission    [facility] with [facility] hospitalist group returned call and spoke with Nupur Cheng  03/04/21 815 [facility]  03/04/21 2717

## 2021-03-05 NOTE — PROGRESS NOTES
Surgery called with report on patient. Out of surgery and doing well. Left femur with IM nail and herb. 3 mepilex in place. Pt normally wears O2 at HS. Currently placed on 2L per NC,k sat @ 95. Pt currently with no complaints of pain. Pt will be brought up to the floor.

## 2021-03-05 NOTE — BRIEF OP NOTE
Brief Postoperative Note      Patient: Taylor Carroll  YOB: 1945  MRN: [id number]    Date of Procedure: 3/5/2021    Pre-Op Diagnosis: LEFT INTERTROCH FRACTURE    Post-Op Diagnosis: Same       Procedure(s):  LEFT FEMUR INTRAMEDULLARY NAIL GRAZYNA INSERTION    Surgeon(s):  Siva Moreira MD    Assistant:  Surgical Assistant: Susan Kenney    Anesthesia: General    Estimated Blood Loss (mL): less than 876     Complications: None    Specimens:   * No specimens in log *    Implants:  Implant Name Type Inv.  Item Serial No.  Lot No. LRB No. Used Action   NAIL IM TROCH 125 DEG [id number] MM TI MARTHA  NAIL IM TROCH 125 DEG 11X200 MM TI MARTHA  [employer] [id number] Left 1 Implanted   SCREW BNE L110MM DIA10.5MM CANC HIP TI LAG ST JORJE LYNNE  SCREW BNE L110MM DIA10.5MM CANC HIP TI LAG ST JORJE LYNNE  [employer] [facility]-WD [id number] Left 1 Implanted   SCREW BNE L40MM DIA5MM LYNNE FOR T2 ALPHA NAILING SYS  SCREW BNE L40MM DIA5MM LYNNE FOR T2 ALPHA NAILING SYS  [employer] [id number] Left 1 Implanted   SCREW BNE L40MM DIA5MM LYNNE FOR T2 ALPHA NAILING SYS  SCREW BNE L40MM DIA5MM LYNNE FOR T2 ALPHA NAILING SYS  [employer] [id number] Left 1 Implanted         Drains:   [REMOVED] Urethral Catheter Latex 16 fr (Removed)       Findings: As above    Electronically signed by Siva Moreira MD on 3/5/2021 at 3:42 PM

## 2021-03-05 NOTE — PROGRESS NOTES
Progress Note    Admit Date:  3/4/2021    Admitted with left hip fracture s/p left hip IM nailing. Subjective:  Mr. Laxmi Chung is back from OR. Awake, alert. C/O left hip pain     Objective:   Patient Vitals for the past 4 hrs:   BP Temp Temp src Pulse Resp SpO2   03/05/21 1600 (!) 166/71 97.1 °F (36.2 °C) Oral 65 16 94 %   03/05/21 1551 (!) 155/74 97.4 °F (36.3 °C) Infrared 67 14 94 %   03/05/21 1545 (!) 155/74   66 17 95 %   03/05/21 1530 (!) 170/82   71 17 94 %   03/05/21 1520 (!) 169/70   72 18    03/05/21 1518 (!) 169/70   73 22    03/05/21 1515 (!) 169/70 97.2 °F (36.2 °C)  74 11 97 %   03/05/21 1513 (!) 169/70   69 16 95 %   03/05/21 1508 (!) 157/71 97.8 °F (36.6 °C) Infrared 70 17 95 %   03/05/21 1504 132/61   70 22 92 %   03/05/21 1500 132/61   69 15 90 %   03/05/21 1455 (!) 119/50   65 17 100 %            Intake/Output Summary (Last 24 hours) at 3/5/2021 1616  Last data filed at 3/5/2021 1457  Gross per 24 hour   Intake 900 ml   Output 150 ml   Net 750 ml       Physical Exam:  Gen: No distress. Alert. Elderly  male  Eyes: No sclera icterus. No conjunctival injection. Neck: Trachea midline. Resp: No accessory muscle use. No crackles. No wheezes. No rhonchi. On RA   CV: Regular rate. Regular rhythm. No murmur. No rub. No edema. GI: Soft, Non-tender. Non-distended. No masses. No organomegaly. Normal bowel sounds. No hernia. Skin: Warm and dry. No rash on exposed extremities. M/S: Left hip TTP. No edema  Neuro: Awake. Grossly nonfocal, follows commands, moves all extremities - as able (limited 2/2 left hip pain), sensation intact to bilateral lower extremities    Psych: No anxiety or agitation.      Scheduled Meds:   budesonide-formoterol  2 puff Inhalation BID    sodium chloride flush  10 mL Intravenous 2 times per day    acetaminophen  650 mg Oral Q6H    sennosides-docusate sodium  1 tablet Oral BID    ceFAZolin (ANCEF) IVPB  2,000 mg Intravenous Q8H    polyethylene glycol  17 g Oral Daily    bisacodyl  5 mg Oral Daily    enoxaparin  40 mg Subcutaneous Daily    amLODIPine  5 mg Oral Nightly    atorvastatin  80 mg Oral Daily    carvedilol  6.25 mg Oral BID    [Held by provider] furosemide  40 mg Oral Daily    insulin glargine  20 Units Subcutaneous Nightly    ipratropium-albuterol  3 mL Inhalation TID    lisinopril  40 mg Oral Daily    pantoprazole  40 mg Oral QAM AC    trospium  20 mg Oral Nightly    enoxaparin  40 mg Subcutaneous Daily    insulin lispro  0-6 Units Subcutaneous TID WC    insulin lispro  0-3 Units Subcutaneous Nightly       Continuous Infusions:   lactated ringers         PRN Meds:  sodium chloride flush, promethazine **OR** ondansetron, magnesium hydroxide, oxyCODONE **OR** oxyCODONE, morphine **OR** morphine      Data:  CBC:   Recent Labs     03/04/21  1420 03/05/21  0527   WBC 14.0* 10.4   HGB 13.6 13.0*   HCT 41.6 38.1*   MCV 90.1 87.3    194     BMP:   Recent Labs     03/04/21  1420 03/05/21  0527    143   K 3.4* 3.0*    108   CO2 28 26   BUN 17 15   CREATININE 1.0 1.0     LIVER PROFILE:   Recent Labs     03/04/21  1420   AST 13*   ALT 12   BILITOT 0.6   ALKPHOS 174*     PT/INR:   Recent Labs     03/04/21  1420   PROTIME 12.4   INR 1.07     CULTURES    RAPID COVID: pending     RADIOLOGY    XR HIP 1 VW W PELVIS LEFT 3/4/2021   Final Result   Nondisplaced intertrochanteric fracture of the proximal left femur. Assessment/Plan:    Left Femur Fracture  Mechanical Fall  - XR left hip/pelvis: non-displaced intertrochanteric fx of proximal left femur  - Admit to Med Surg  - SQ Lovenox, PRN pain meds, hold ASA & Plavix  - ortho consult    S/P left hip IM nailing today 3/5. CAD  - S/P remote stent  Ischemic CMP  EKG changes   - EKG reviewed - T wave inversion in lateral leads, new compared to prior-  - trop 0.01  - cards consulted for pre-op clearance  EKG changes likely due to hypokalemia .    cont to monitor in tele   - cont Coreg, Lisinopril, Lipitor; hold ASA & Plavix in monet-operative setting     Chronic Combined CHF  - appears compensated  - last echo 9/2017 - EF 45-50%, grade I DD  - cont Coreg, Lisinopril and Lasix    Hypokalemia  - 3.0  - replete and repeat BMP tomorrow    Hypomagnesemia  - 1.6  - give 2 g Mg  - repeat Mg tomorrow     Leukocytosis - Resolved  - 14  - favor reactive, no concerns for infection  - repeat CBC was normal     Type II DM  - controlled  - cont Lantus 20 u nightly, add low dose SSI; hold Janumet  - POC Glucose, monitor     HTN  - remains uncontrolled, likely 2/2 pain  - cont Norvasc, Coreg Lasix, PRN pain meds for pain control  - add PRN hydralazine        COPD  - no AE  - cont [address] Duonebs, Dulera     GERD  - cont Protonix     Recent Admission to [facility]   - on 2/19 for hematemesis  - underwent EGD which was unremarkable for active bleeding, unable to r/o small Tracey-Clara tear    DVT Prophylaxis: Lovenox  Diet: DIET CARB CONTROL;  Code Status: Full Code    Apollo Richter MD  3/5/2021

## 2021-03-05 NOTE — PROGRESS NOTES
RESPIRATORY THERAPY ASSESSMENT    Name:  Mukesh Pinto  Medical Record Number:  [id number]  Age: 66 y.o. Gender: male  : 1945  Today's Date:  3/5/2021  Room:  [address] [place] [address]    Assessment     Is the patient being admitted for a COPD or Asthma exacerbation? No   (If yes the patient will be seen every 4 hours for the first 24 hours and then reassessed)    Patient Admission Diagnosis      Allergies  Allergies   Allergen Reactions    No Known Allergies        Minimum Predicted Vital Capacity:               Actual Vital Capacity:                    Pulmonary History:COPD  Home Oxygen Therapy:  room air  Home Respiratory Therapy:Albuterol, Albuterol/Ipratropium Bromide HHN and Mometasone/Formoterol Pt states that he does not take any of these medications at home  Current Respiratory Therapy:  Symbicort BID, Duoneb TID          Respiratory Severity Index(RSI)   Patients with orders for inhalation medications, oxygen, or any therapeutic treatment modality will be placed on Respiratory Protocol. They will be assessed with the first treatment and at least every 72 hours thereafter. The following severity scale will be used to determine frequency of treatment intervention.     Smoking History: Pulmonary Disease or Smoking History, Greater than 15 pack year = 2    Social History  Social History     Tobacco Use    Smoking status: Former Smoker     Packs/day: 1.50     Types: Cigarettes     Quit date: 2017     Years since quitting: 3.6    Smokeless tobacco: Never Used   Substance Use Topics    Alcohol use: No    Drug use: No       Recent Surgical History: None = 0  Past Surgical History  Past Surgical History:   Procedure Laterality Date    CORONARY ANGIOPLASTY WITH STENT PLACEMENT      FEMORAL BYPASS Bilateral     UPPER GASTROINTESTINAL ENDOSCOPY N/A 2021    EGD CONTROL HEMORRHAGE performed by Lloyd Diggs MD at [address] [place] [address] Level of Consciousness: Alert, Follows Commands but Disoriented = 1    Level of Activity: Bedridden, unresponsive or quadriplegic = 4    Respiratory Pattern: Regular Pattern; RR 8-20 = 0    Breath Sounds: Diminshed bilaterally and/or crackles = 2    Sputum   ,  ,    Cough: Strong, spontaneous, non-productive = 0    Vital Signs   BP (!) 188/82   Pulse 72   Temp 98 °F (36.7 °C) (Oral)   Resp 18   Ht 6' (1.829 m)   Wt 167 lb (75.8 kg)   SpO2 93%   BMI 22.65 kg/m²   SPO2 (COPD values may differ): Greater than or equal to 92% on room air = 0    Peak Flow (asthma only): not applicable = 0    RSI: 2-37 = TID (three times daily) and Q4hr PRN for dyspnea        Plan       Goals: medication delivery, mobilize retained secretions, volume expansion and improve oxygenation    Patient/caregiver was educated on the proper method of use for Respiratory Care Devices:  Yes      Level of patient/caregiver understanding able to:   ? Verbalize understanding   ? Demonstrate understanding       ? Teach back        ? Needs reinforcement       ? No available caregiver               ? Other:     Response to education:  Idalmis Hinojosa     Is patient being placed on Home Treatment Regimen? Yes     Does the patient have everything they need prior to discharge? NA     Comments: chart reviewed & pt assessed    Plan of Care: maintain home regimen    Electronically signed by Abby Chanel RCP on 3/5/2021 at 4:10 AM    Respiratory Protocol Guidelines     1. Assessment and treatment by Respiratory Therapy will be initiated for medication and therapeutic interventions upon initiation of aerosolized medication. 2. Physician will be contacted for respiratory rate (RR) greater than 35 breaths per minute. Therapy will be held for heart rate (HR) greater than 140 beats per minute, pending direction from physician.   3. Bronchodilators will be administered via Metered Dose Inhaler (MDI) with spacer when the following criteria are met: a. Alert and cooperative     b. HR < 140 bpm  c. RR < 30 bpm                d. Can demonstrate a 23 second inspiratory hold  4. Bronchodilators will be administered via Hand Held Nebulizer [facility]) to patients when ANY of the following criteria are met  a. Incognizant or uncooperative          b. Patients treated with HHN at Home        c. Unable to demonstrate proper use of MDI with spacer     d. RR > 30 bpm   5. Bronchodilators will be delivered via Metered Dose Inhaler (MDI), HHN, Aerogen to intubated patients on mechanical ventilation. 6. Inhalation medication orders will be delivered and/or substituted as outlined below. Aerosolized Medications Ordering and Administration Guidelines:    1. All Medications will be ordered by a physician, and their frequency and/or modality will be adjusted as defined by the patients Respiratory Severity Index (RSI) score. 2. If the patient does not have documented COPD, consider discontinuing anticholinergics when RSI is less than 9.  3. If the bronchospasm worsens (increased RSI), then the bronchodilator frequency can be increased to a maximum of every 4 hours. If greater than every 4 hours is required, the physician will be contacted. 4. If the bronchospasm improves, the frequency of the bronchodilator can be decreased, based on the patient's RSI, but not less than home treatment regimen frequency. 5. Bronchodilator(s) will be discontinued if patient has a RSI less than 9 and has received no scheduled or as needed treatment for 72  Hrs. Patients Ordered on a Mucolytic Agent:    1. Must always be administered with a bronchodilator. 2. Discontinue if patient experiences worsened bronchospasm, or secretions have lessened to the point that the patient is able to clear them with a cough.     Anti-inflammatory and Combination Medications: 1. If the patient lacks prior history of lung disease, is not using inhaled anti-inflammatory medication at home, and lacks wheezing by examination or by history for at least 24 hours, contact physician for possible discontinuation.

## 2021-03-05 NOTE — CONSULTS
Department of Orthopedic Surgery  Physician Assistant   Consult Note        Reason for Consult:  Left hip pain  Requesting Physician: Isaiah Hutton MD  Date of Service: 3/5/2021 8:38 AM    CHIEF COMPLAINT:  As Above    History Obtained From:  patient    HISTORY OF PRESENT ILLNESS:                The patient is a 66 y.o. male who presents with above chief complaint. Pt states that he fell trying to get out of his truck yesterday. Landed on his left side and hip. Felt immediate pain and was unable to get up or walk after the fall. No prior hip injuries. No n/t in the leg. No other complaints at this time. Past Medical History:        Diagnosis Date    Arthritis     CAD (coronary artery disease)     [facility]     Diabetes mellitus [facility]     Hyperlipidemia     Hypertension      Past Surgical History:        Procedure Laterality Date    CORONARY ANGIOPLASTY WITH STENT PLACEMENT      FEMORAL BYPASS Bilateral     UPPER GASTROINTESTINAL ENDOSCOPY N/A 2/19/2021    EGD CONTROL HEMORRHAGE performed by Geoff Oates MD at [address] [place] [address]         Medications Prior to Admission:   Prior to Admission medications    Medication Sig Start Date End Date Taking? Authorizing Provider   albuterol sulfate HFA (PROAIR HFA) 108 (90 Base) MCG/ACT inhaler Inhale 2 puffs into the lungs every 6 hours as needed for Wheezing 4/9/18   Duyen Grimaldo MD   acetaminophen (TYLENOL) 325 MG tablet Take 2 tablets by mouth every 4 hours as needed for Pain or Fever Maximum dose of acetaminophen is 4000 mg from all sources in 24 hours. 10/3/17   Henrene Seip, MD   ipratropium-albuterol (DUONEB) 0.5-2.5 (3) MG/3ML SOLN nebulizer solution Inhale 3 mLs into the lungs 3 times daily 10/3/17   Henrene Seip, MD   mometasone-formoterol [facility]) 200-5 MCG/ACT inhaler Inhale 2 puffs into the lungs 2 times daily Substituted for Budesonide-Formoterol (SYMBICORT).  10/3/17   Henrene Seip, MD atorvastatin (LIPITOR) 80 MG tablet Take 1 tablet by mouth daily 10/3/17   Duc Nguyen MD   amLODIPine (NORVASC) 10 MG tablet Take 0.5 tablets by mouth nightly Note: new lower dose 10/3/17   Duc Nguyen MD   furosemide (LASIX) 40 MG tablet Take 1 tablet by mouth daily 10/3/17   Duc Nguyen MD   [facility]) 60 MG CP24 extended release capsule Take 1 capsule by mouth daily 10/3/17   Duc Nguyne MD   insulin glargine (LANTUS SOLOSTAR) 100 UNIT/ML injection pen Inject 20 Units into the skin nightly 10/3/17   Duc Nguyen MD   insulin lispro (HUMALOG KWIKPEN) 100 UNIT/ML pen Inject 0-6 Units into the skin 3 times daily (before meals) Low Dose Correction Algorithm: BS  No Insulin, -199 1 Unit, 200-249 2 Units, 250-299 3 Units, 300-349 4 Units, 350-399 5 Units, 400 and above give 6 Units 10/3/17   Duc Nguyen MD   carvedilol (COREG) 6.25 MG tablet Take 6.25 mg by mouth 2 times daily    Historical Provider, MD   aspirin 81 MG chewable tablet Take 81 mg by mouth daily    Historical Provider, MD   lisinopril (PRINIVIL;ZESTRIL) 40 MG tablet Take 40 mg by mouth daily    Historical Provider, MD   pantoprazole sodium (PROTONIX) 40 MG PACK packet Take 40 mg by mouth every morning (before breakfast)    Historical Provider, MD   SITagliptin-MetFORMIN HCl ER (JANUMET XR) 100-1000 MG TB24 Take 1 tablet by mouth daily    Historical Provider, MD   albuterol sulfate HFA (PROVENTIL HFA) 108 (90 BASE) MCG/ACT inhaler Inhale 2 puffs into the lungs every 4 hours as needed for Wheezing or Shortness of Breath With spacer (and mask if indicated). Thanks. 12/24/16 3/29/17  Antelmo Barton MD   clopidogrel (PLAVIX) 75 MG tablet Take 75 mg by mouth daily    Historical Provider, MD       Allergies:  No known allergies    Social History:    Tobacco:  reports that he quit smoking about 3 years ago. His smoking use included cigarettes. He smoked 1.50 packs per day.  He has never used smokeless tobacco.   Alcohol: reports no history of alcohol use. Illicit Drug: No  Family History:   History reviewed. No pertinent family history. REVIEW OF SYSTEMS:    CONSTITUTIONAL:  negative  MUSCULOSKELETAL:  positive for  pain  All other systems reviewed and negative    PHYSICAL EXAM:    awake, alert, cooperative, no apparent distress, and appears stated age  MUSCULOSKELETAL:  there is no redness, warmth, or swelling of the joints  full range of motion noted  motor strength is 5 out of 5 all extremities bilaterally  tone is normal  with exception of  LEFT HIP:  redness absent  warmth absent  swelling present and noted lateral aspect of the hip.  tenderness present and noted lateral hip and extending into the groin. Pain with log roll of the leg. Sensation intact to touch in the left leg. No obvious deformity. The leg is slightly externally rotated and shortened today on exam.  Good distal pulses. range of motion limited due to pain. Moving foot and ankle. DATA:    CBC:   Recent Labs     03/04/21  1420 03/05/21  0527   WBC 14.0* 10.4   HGB 13.6 13.0*    194     BMP:    Recent Labs     03/04/21  1420 03/05/21  0527    143   K 3.4* 3.0*    108   CO2 28 26   BUN 17 15   CREATININE 1.0 1.0   GLUCOSE 189* 109*     INR:   Recent Labs     03/04/21  1420   INR 1.07       Radiology:   XR HIP 1 VW W PELVIS LEFT   Final Result   Nondisplaced intertrochanteric fracture of the proximal left femur. IMPRESSION/RECOMMENDATIONS:    Assessment: left side intertrochanteric hip fracture    Plan:  1) Will plan to take the patient to the OR this afternoon for short IM nail placement with Dr Domenico Merino. Patient is awaiting final cardiology input for clearance today and appreciate those recommendations. Will plan to keep patient NPO at this time. Have discussed the procedure with patient and he is in agreement with proceeding. Informed consent was discussed with the patient.   This included a detailed description of the procedure. Risks, benefits and alternatives specific to this diagnosis and procedure were outlined. Standard surgical risks of anesthesia, bleeding, nerve damage, infection, need for further surgeries, disability and death also outlined. Verbal confirmation of informed consent was obtained. Signature obtained by the staff. Thank you for the opportunity to consult on this patient. Giles Peters       Attending Orthopaedic Surgeon:  Agree with above plan. The encounter with Roberto Briseno was carried by myself, Dr Brenda Carvajal, who personally examined the patient and reviewed the plan. Requires surgical fixation of left hip fracture with short IM nail. Risk/benefits/alternatives discussed. Will proceed once cleared. This dictation was performed with a verbal recognition program (DRAGON) and it was checked for errors. It is possible that there are still dictated errors within this office note. If so, please bring any errors to my attention for an addendum. All efforts were made to ensure that this office note is accurate.        Elsie Hernadez MD

## 2021-03-05 NOTE — OP NOTE
Operative Note      Patient: Mir Ann  YOB: 1945  MRN: [id number]    Date of Procedure: 3/5/2021    Pre-Op Diagnosis: LEFT INTERTROCH FRACTURE    Post-Op Diagnosis: Same       Procedure(s):  LEFT FEMUR INTRAMEDULLARY NAIL GRAZYNA INSERTION    Surgeon(s):  Fiona Correia MD    Assistant:   Surgical Assistant: Abby Hay    Anesthesia: General    Estimated Blood Loss (mL): less than 118     Complications: None    Specimens:   * No specimens in log *    Implants:  Implant Name Type Inv. Item Serial No.  Lot No. LRB No. Used Action   NAIL IM TROCH 125 DEG [id number] MM TI MARTHA  NAIL IM TROCH 125 DEG 11X200 MM TI MARTHA  [employer] [id number] Left 1 Implanted   SCREW BNE L110MM DIA10.5MM CANC HIP TI LAG ST JORJE LYNNE  SCREW BNE L110MM DIA10.5MM CANC HIP TI LAG ST JORJE LYNNE  [employer] [facility]-WD [id number] Left 1 Implanted   SCREW BNE L40MM DIA5MM LYNNE FOR T2 ALPHA NAILING SYS  SCREW BNE L40MM DIA5MM LYNNE FOR T2 ALPHA NAILING SYS  [employer] [id number] Left 1 Implanted   SCREW BNE L40MM DIA5MM LYNNE FOR T2 ALPHA NAILING SYS  SCREW BNE L40MM DIA5MM LYNNE FOR T2 ALPHA NAILING SYS  [employer] [id number] Left 1 Implanted         Drains:   [REMOVED] Urethral Catheter Latex 16 fr (Removed)       Findings: Stable intertrochanteric fracture      Operative Report: Indications: The patient is a 66 y.o. male who presented with an acute fracture of the left hip. The patient was neurovascularly intact from the injury. The patient was offered surgical reduction fixation and the risk, benefits, and alternatives and involved recovery were discussed. he has elected to undergo surgical fixation with intramedullary rodding in order to allow the patient to mobilize, have better pain control, and allow the fracture to heal in a more anatomic alignment. he is prepared to proceed, consent was obtained and all questions were answered to his satisfaction.     Description:   The patient was identified in the preoperative holding area and the correct site of the left hip was marked. He was then brought to the operating room and kept on her hospital bed in the supine position and general anesthesia was administered. Well-padded ski boots were placed on the operative foot to be secured into the [place] table. He was then transferred to the operative table and placed against a well-padded perineal post with the operative leg secured into the lower limbs spars. The well leg was placed up upon a leg varma in a well padded position. We proceeded with attempting a closed reduction under live fluoroscopy. We managed to do so with adequate traction, internal rotation, and adduction. Reduction was satisfactory to proceed with surgical nailing. Surgical time out was called verifying necessary data including the administration of preoperative antibiotics. The left hip/leg area was then prepped and draped in standard sterile fashion. We commenced with getting our start point for the same nail. We made a 3 cm incision proximal greater trochanter. We incised the IT band. We then used a 3.2 mm guidepin determine the entry point of the femur by starting of the downsloping tip of the greater trochanter. The starting point was confirmed on x-ray. This guidepin was tapped and with a mallet and its position was confirmed on AP and lateral fluoroscopy and femur. A separate incision at the level of the lesser trochanter was made under fluoroscopy to use a bone hook to hold a more anatomic alignment to the femoral neck where the intertrochanteric fracture was exiting and displacing. This was now held in place. We were satisfied with a neck shaft angle, restoration of Shenton's line, and the medial proximal femoral angle as the top of GT was in line with the center of the femoral head. The entry triple reamer (size 15 mm) was then used to make room for the implant.     After the entry reamer, the [place] gamma 3 short nail system (11 x 200 mm x 125deg) was then impacted in the canal, locked into the femoral head using a 10.5 x 110 mm lag screw. Position and orientation screw was confirmed on x-ray, and this was in the desired posterior and inferior quadrant of the femoral head. The set screw was then advanced into the nail proximally to statically lock the construct. Some further compression was affected across the fracture, and x-ray showed near anatomic reduction of the fracture. 2 locked distal locking screws (one static, one dynamic) were then inserted lateral to medial in the proximal femur (distal aspect of the nail), using the distal aiming guide for the system. Position was confirmed on x-ray. Final x-rays were taken we were satisfied with the alignment of the hip fracture. The nail was in an excellent position. Incisions were copiously irrigated. Subcutaneous closure was carried out with #2-0 Vicryl. Skin was approximated with  3-0 monocryl and then prineo adhesive dressing. The wound was dressed sterilely with the Mepilex waterproof dressing. The procedure was well-tolerated without any complications. Estimated blood loss was less than 100 cc. The patient was then removed from the operative table, awakened and taken to recovery room in stable condition having tolerated the procedure well. The patient's foot was noted to be warm and pink color removal of the traction boot. No significant skin lesions were noted either on the feet. All needle and sponge counts matched the initial count per circulating and scrub personnel x2 prior to terminating this case. Sincerely,      Kody Bell MD [facility]  Hip Preservation & Sports Medicine Surgeon   [address] [facility] and [address] [facility] HospitalSofie Reyes [address] [place] [address]  Email: [email or web address]  Office: [phone]    03/05/21  3:40 PM          Electronically signed by Kody Bell MD on 3/5/2021 at 3:40 PM

## 2021-03-05 NOTE — PROGRESS NOTES
Cardiology consult called to [phone] transferred to a voicemail. Message was left with all the patients information that's required.

## 2021-03-05 NOTE — CONSULTS
[facility]  [phone]        Reason for Consultation/Chief Complaint: \"I have been having left hip pain\"    History of Present Illness:  Taylor Carroll is a 66 y.o. patient who presented to the hospital with complaints of a fall and left hip pain. He tripped on his slippers. He has fracture left hip and is being evaluated preoperatatively by cardiology. He has no chest pain palpitations dizziness or syncope. He admits to be active smoker. He is very poor historian and c/o very dry mouth. He denies any heart disease in past.   I have been asked to provide consultation regarding further management and testing. Review of epic chart as follows. Seen in the past by Dr. Tere Escalona. PMH: Ischemic CMP, CAD,  HTN, DM 2, HLD, COPD, PVD, CKD.   2001 [facility] 139 (EF.48, successful PTA of the left external illiac artery. successful stent placement, exernal illiac artry.)    history of anteroseptal myocardial infarction with bare metal stent in 2001. Ejection fraction at that time 20-25%. Past Medical History:   has a past medical history of Arthritis, CAD (coronary artery disease), COPD (chronic obstructive pulmonary disease) ([facility] [address] Diabetes mellitus ([facility] [address] Hyperlipidemia, and Hypertension. Surgical History:   has a past surgical history that includes Coronary angioplasty with stent; femoral bypass (Bilateral); and Upper gastrointestinal endoscopy (N/A, 2/19/2021). Social History:   reports that he quit smoking about 3 years ago. His smoking use included cigarettes. He smoked 1.50 packs per day. He has never used smokeless tobacco. He reports that he does not drink alcohol or use drugs. Family History:  Denies family history of heart disease    Home Medications:  Were reviewed and are listed in nursing record. and/or listed below  Prior to Admission medications    Medication Sig Start Date End Date Taking?  Authorizing Provider   albuterol sulfate HFA (PROAIR HFA) 108 (90 Base) MCG/ACT inhaler Inhale 2 puffs into the lungs every 6 hours as needed for Wheezing 4/9/18   Omar Mcadams MD   acetaminophen (TYLENOL) 325 MG tablet Take 2 tablets by mouth every 4 hours as needed for Pain or Fever Maximum dose of acetaminophen is 4000 mg from all sources in 24 hours. 10/3/17   Wilene Brittle, MD   ipratropium-albuterol (DUONEB) 0.5-2.5 (3) MG/3ML SOLN nebulizer solution Inhale 3 mLs into the lungs 3 times daily 10/3/17   Wilene Brittle, MD   mometasone-formoterol [facility]) 200-5 MCG/ACT inhaler Inhale 2 puffs into the lungs 2 times daily Substituted for Budesonide-Formoterol (SYMBICORT).  10/3/17   Wilene Brittle, MD   atorvastatin (LIPITOR) 80 MG tablet Take 1 tablet by mouth daily 10/3/17   Wilene Brittle, MD   amLODIPine (NORVASC) 10 MG tablet Take 0.5 tablets by mouth nightly Note: new lower dose 10/3/17   Wilene Brittle, MD   furosemide (LASIX) 40 MG tablet Take 1 tablet by mouth daily 10/3/17   Wilene Brittle, MD   [facility]) 60 MG CP24 extended release capsule Take 1 capsule by mouth daily 10/3/17   Wilene Brittle, MD   insulin glargine (LANTUS SOLOSTAR) 100 UNIT/ML injection pen Inject 20 Units into the skin nightly 10/3/17   Wilene Brittle, MD   insulin lispro (HUMALOG KWIKPEN) 100 UNIT/ML pen Inject 0-6 Units into the skin 3 times daily (before meals) Low Dose Correction Algorithm: BS  No Insulin, -199 1 Unit, 200-249 2 Units, 250-299 3 Units, 300-349 4 Units, 350-399 5 Units, 400 and above give 6 Units 10/3/17   Wilene Brittle, MD   carvedilol (COREG) 6.25 MG tablet Take 6.25 mg by mouth 2 times daily    Historical Provider, MD   aspirin 81 MG chewable tablet Take 81 mg by mouth daily    Historical Provider, MD   lisinopril (PRINIVIL;ZESTRIL) 40 MG tablet Take 40 mg by mouth daily    Historical Provider, MD   pantoprazole sodium (PROTONIX) 40 MG PACK packet Take 40 mg by mouth every morning (before breakfast)    Historical Provider, MD SITagliptin-MetFORMIN HCl ER (JANUMET XR) 100-1000 MG TB24 Take 1 tablet by mouth daily    Historical Provider, MD   albuterol sulfate HFA (PROVENTIL HFA) 108 (90 BASE) MCG/ACT inhaler Inhale 2 puffs into the lungs every 4 hours as needed for Wheezing or Shortness of Breath With spacer (and mask if indicated). Thanks. 12/24/16 3/29/17  Rik Enrique MD   clopidogrel (PLAVIX) 75 MG tablet Take 75 mg by mouth daily    Historical Provider, MD        Allergies:  No known allergies     Review of Systems:   12 point ROS negative in all areas as listed below except as in [place]  Constitutional, EENT, pulmonary, GI, , skin, neurological, hematological, endocrine, Psychiatric    Physical Examination:    Vitals:    03/05/21 0800   BP: (!) 176/76   Pulse: 61   Resp: 16   Temp: 96.9 °F (36.1 °C)   SpO2: 91%    Weight: 167 lb (75.8 kg)         General Appearance:  Alert, cooperative, no distress, appears stated age   Head:  Normocephalic, without obvious abnormality, atraumatic   Eyes:  PERRL, conjunctiva/corneas clear       Nose: Nares normal, no drainage or sinus tenderness   Throat: Lips, mucosa, and tongue normal   Neck: Supple, symmetrical, trachea midline, no adenopathy, thyroid: not enlarged, symmetric, no tenderness/mass/nodules, no carotid bruit or JVD       Lungs:   Crackles posterior chest  to auscultation bilaterally, respirations unlabored   Chest Wall:  No tenderness or deformity   Heart:  Regular rate and rhythm, S1, S2 normal, no murmur, rub or gallop   Abdomen:   Soft, non-tender, bowel sounds active all four quadrants,  no masses, no organomegaly           Extremities: Extremities normal, atraumatic, no cyanosis or edema   Pulses: 2+ and symmetric   Skin: Skin color, texture, turgor normal, no rashes or lesions   Pysch: Normal mood and affect   Neurologic: Normal gross motor and sensory exam.         Labs  CBC:   Lab Results   Component Value Date    WBC 10.4 03/05/2021    RBC 4.37 03/05/2021    HGB 13.0 03/05/2021    HCT 38.1 03/05/2021    MCV 87.3 03/05/2021    RDW 13.9 03/05/2021     03/05/2021     CMP:    Lab Results   Component Value Date     03/05/2021    K 3.0 03/05/2021     03/05/2021    CO2 26 03/05/2021    BUN 15 03/05/2021    CREATININE 1.0 03/05/2021    GFRAA >60 03/05/2021    AGRATIO 1.2 03/04/2021    LABGLOM >60 03/05/2021    GLUCOSE 109 03/05/2021    PROT 6.5 03/04/2021    CALCIUM 8.6 03/05/2021    BILITOT 0.6 03/04/2021    ALKPHOS 174 03/04/2021    AST 13 03/04/2021    ALT 12 03/04/2021     PT/INR:  No results found for: PTINR  Lab Results   Component Value Date    CKTOTAL 23 (L) 10/13/2017    TROPONINI 0.01 03/04/2021     I have reviewed the following tests and documented in this encounter as follows:   Discussed with patient  EKG:  3/4/2021  Normal sinus rhythmPossible Left atrial enlargementAnterolateral infarct (cited on or before 25-JUL-2017)Abnormal ECGWhen compared with ECG of 19-FEB-2021 09:22,ST no longer depressed in Inferior leadsNonspecific T wave abnormality now evident in Inferior leadsInverted T waves have replaced nonspecific T wave abnormality in Lateral leadsConfirmed by Demond Ventura MD, [email or web address] (1988) on 3/4/2021 5:52:43 PM     Hip Xray 3/4/2021  Nondisplaced intertrochanteric fracture of the proximal left femur. Chest CT 2/19/21  FINDINGS:   Lungs/pleura: The central airways are patent. Is a 7 cm right upper lobe for spine abnormalities seen on previous chest   x-ray. No additional pulmonary nodules masses are identified. There are calcified pleural plaques at the left base consistent prior   asbestos exposure. 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          Chest Xray 11/15/2018  Trace amount of bilateral pleural to high cardiac risk but hip surgery is urgent and needs to be done for pain relief and mobility so proceed with it. Will optimize his labs. I recommend that the patient undergo portable repeat Chest xray  Replace K and Mg  He appears dehydrated I will hold his lasix and start IV fluids  Consider cardiac monitoring on telemetry  Resume cardiac meds tomorrrow    I will address the patient's cardiac risk factors and adjusted pharmacologic treatment as needed. In addition, I have reinforced the need for patient directed risk factor modification. Tobacco use was discussed with the patient and educated on the negative effects. I have asked the patient to not utilize these agents. Thank you for allowing to us to participate in the care or Gaby Smith. Further evaluation will be based upon the patient's clinical course and testing results. All questions and concerns were addressed to the patient/family. Alternatives to my treatment were discussed. The note was completed using EMR. Every effort was made to ensure accuracy; however, inadvertent computerized transcription errors may be present.

## 2021-03-05 NOTE — PROGRESS NOTES
Transport here to take pt back to [address] [place] [address]. Pt in stable condition at this time. Pt going on 2 L 02. Primary RN [facility]. Pt denies pain or other needs.

## 2021-03-05 NOTE — ANESTHESIA PRE PROCEDURE
Department of Anesthesiology  Preprocedure Note       Name:  Nikki Murphy   Age:  66 y.o.  :  1945                                          MRN:  [id number]         Date:  3/5/2021      Surgeon: Liliana Arndt):  Kay Jackson MD    Procedure: Procedure(s):  LEFT FEMUR INTRAMEDULLARY NAIL GRAZYNA INSERTION    Medications prior to admission:   Prior to Admission medications    Medication Sig Start Date End Date Taking? Authorizing Provider   albuterol sulfate HFA (PROAIR HFA) 108 (90 Base) MCG/ACT inhaler Inhale 2 puffs into the lungs every 6 hours as needed for Wheezing 18   Mari Majano MD   acetaminophen (TYLENOL) 325 MG tablet Take 2 tablets by mouth every 4 hours as needed for Pain or Fever Maximum dose of acetaminophen is 4000 mg from all sources in 24 hours. 10/3/17   Halima Liu MD   ipratropium-albuterol (DUONEB) 0.5-2.5 (3) MG/3ML SOLN nebulizer solution Inhale 3 mLs into the lungs 3 times daily 10/3/17   Halima Liu MD   mometasone-formoterol [facility]) 200-5 MCG/ACT inhaler Inhale 2 puffs into the lungs 2 times daily Substituted for Budesonide-Formoterol (SYMBICORT).  10/3/17   Halima Liu MD   atorvastatin (LIPITOR) 80 MG tablet Take 1 tablet by mouth daily 10/3/17   Halima Liu MD   amLODIPine (NORVASC) 10 MG tablet Take 0.5 tablets by mouth nightly Note: new lower dose 10/3/17   Halima Liu MD   furosemide (LASIX) 40 MG tablet Take 1 tablet by mouth daily 10/3/17   Halima Liu MD   [facility]) 60 MG CP24 extended release capsule Take 1 capsule by mouth daily 10/3/17   Halima Liu MD   insulin glargine (LANTUS SOLOSTAR) 100 UNIT/ML injection pen Inject 20 Units into the skin nightly 10/3/17   Halima Liu MD   insulin lispro (HUMALOG KWIKPEN) 100 UNIT/ML pen Inject 0-6 Units into the skin 3 times daily (before meals) Low Dose Correction Algorithm: BS  No Insulin, -199 1 Unit, 200-249 2 Units, 250-299 3 Units, 300-349 4 Units, 350-399 5 Units, 400 and above give 6 Units 10/3/17   Jose Ramon Patel MD   carvedilol (COREG) 6.25 MG tablet Take 6.25 mg by mouth 2 times daily    Historical Provider, MD   aspirin 81 MG chewable tablet Take 81 mg by mouth daily    Historical Provider, MD   lisinopril (PRINIVIL;ZESTRIL) 40 MG tablet Take 40 mg by mouth daily    Historical Provider, MD   pantoprazole sodium (PROTONIX) 40 MG PACK packet Take 40 mg by mouth every morning (before breakfast)    Historical Provider, MD   SITagliptin-MetFORMIN HCl ER (JANUMET XR) 100-1000 MG TB24 Take 1 tablet by mouth daily    Historical Provider, MD   albuterol sulfate HFA (PROVENTIL HFA) 108 (90 BASE) MCG/ACT inhaler Inhale 2 puffs into the lungs every 4 hours as needed for Wheezing or Shortness of Breath With spacer (and mask if indicated). Thanks. 12/24/16 3/29/17  Nithin Red MD   clopidogrel (PLAVIX) 75 MG tablet Take 75 mg by mouth daily    Historical Provider, MD       Current medications:    Current Facility-Administered Medications   Medication Dose Route Frequency Provider Last Rate Last Admin    budesonide-formoterol (SYMBICORT) 160-4.5 MCG/ACT inhaler 2 puff  2 puff Inhalation BID Mavis Hinojosa MD   2 puff at 03/05/21 0736    ceFAZolin (ANCEF) 2000 mg in sterile water 20 mL IV syringe  2,000 mg Intravenous On Call to [address], [place]        sodium chloride 0.9 % infusion             0.9 % sodium chloride infusion   Intravenous Continuous Piter England MD        meperidine (DEMEROL) injection 12.5 mg  12.5 mg Intravenous Q5 Min PRN Deejay Richey MD        HYDROmorphone (DILAUDID) injection 0.5 mg  0.5 mg Intravenous Q10 Min PRN Deejay Richey MD        HYDROmorphone (DILAUDID) injection 0.5 mg  0.5 mg Intravenous Q5 Min PRN Deejay Richey MD        oxyCODONE-acetaminophen (PERCOCET) 5-325 MG per tablet 1 tablet  1 tablet Oral PRN Deejay Richey MD        Or    oxyCODONE-acetaminophen (PERCOCET) 5-325 MG per tablet 2 tablet  2 tablet Oral PRN Juju Hoover MD        [facility]) injection 4 mg  4 mg Intravenous Q30 Min PRN Juju Hoover MD        diphenhydrAMINE (BENADRYL) injection 6.25 mg  6.25 mg Intravenous Once PRN Juju Hoover MD        labetalol (NORMODYNE;TRANDATE) injection 5 mg  5 mg Intravenous Q15 Min PRN Juju Hoover MD        hydrALAZINE (APRESOLINE) injection 5 mg  5 mg Intravenous Q30 Min PRN Juju Hoover MD        amLODIPine (NORVASC) tablet 5 mg  5 mg Oral Nightly JEN Hancock CNP        atorvastatin (LIPITOR) tablet 80 mg  80 mg Oral Daily JEN Hancock CNP        carvedilol (COREG) tablet 6.25 mg  6.25 mg Oral BID JEN Hancock CNP   6.25 mg at 03/05/21 1214    [Held by provider] furosemide (LASIX) tablet 40 mg  40 mg Oral Daily JEN Hancock CNP        insulin glargine (LANTUS) injection vial 20 Units  20 Units Subcutaneous Nightly JEN Hancock CNP   Stopped at 03/04/21 2130    ipratropium-albuterol (DUONEB) nebulizer solution 3 mL  3 mL Inhalation TID Mitchell Mcknight MD   3 mL at 03/05/21 0736    lisinopril (PRINIVIL;ZESTRIL) tablet 40 mg  40 mg Oral Daily JEN Hancock CNP   40 mg at 03/05/21 0806    pantoprazole (PROTONIX) tablet 40 mg  40 mg Oral QAM AC JEN Hancock CNP   40 mg at 03/05/21 3799    trospium (SANCTURA) tablet 20 mg  20 mg Oral Nightly JEN Hancock CNP        sodium chloride flush 0.9 % injection 10 mL  10 mL Intravenous 2 times per day JEN Hancock CNP   10 mL at 03/04/21 2130    sodium chloride flush 0.9 % injection 10 mL  10 mL Intravenous PRN JEN Hancock CNP        enoxaparin (LOVENOX) injection 40 mg  40 mg Subcutaneous Daily JEN Hancock CNP        promethazine (PHENERGAN) tablet 12.5 mg  12.5 mg Oral Q6H PRN JEN Hancock CNP        Or    ondansetron (ZOFRAN) injection 4 mg  4 mg Intravenous Q6H PRN JEN Hancock - CNP        polyethylene glycol (GLYCOLAX) packet 17 g  17 g Oral Daily PRN Lambert Croissant, APRN - CNP        acetaminophen (TYLENOL) tablet 650 mg  650 mg Oral Q6H PRN Lambert Croissant, APRN - CNP        Or    acetaminophen (TYLENOL) suppository 650 mg  650 mg Rectal Q6H PRN Lambert Croissant, APRN - CNP        morphine (PF) injection 2 mg  2 mg Intravenous Q4H PRN Vanda Began, PA   2 mg at 03/05/21 2480    insulin lispro (HUMALOG) injection vial 0-6 Units  0-6 Units Subcutaneous TID [facility] Vanda Began, PA        insulin lispro (HUMALOG) injection vial 0-3 Units  0-3 Units Subcutaneous Nightly Vanda Began, [place]   Stopped at 03/04/21 2130    glucose (GLUTOSE) 40 % oral gel 15 g  15 g Oral PRN Vanda Began, PA        dextrose 50 % IV solution  12.5 g Intravenous PRN Vanda Began, PA        glucagon (rDNA) injection 1 mg  1 mg Intramuscular PRN Vanda Began, PA        dextrose 5 % solution  100 mL/hr Intravenous PRN Vanda Began, PA           Allergies: Allergies   Allergen Reactions    No Known Allergies        Problem List:    Patient Active Problem List   Diagnosis Code    Arthritis M19.90    Diabetes mellitus ([facility] [address] E11.9    Hypertension I10    Hyperlipidemia E78.5    Ischemic stroke ([facility] [address] I63.9    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 XXXA    Hypokalemia E87.6    Leukocytosis D72.829       Past Medical History:        Diagnosis Date    Arthritis     CAD (coronary artery disease)     COPD (chronic obstructive pulmonary disease) ([facility] [address]     Diabetes mellitus ([facility] [address]     Hyperlipidemia     Hypertension        Past Surgical History:        Procedure Laterality Date    CORONARY ANGIOPLASTY WITH STENT PLACEMENT      FEMORAL BYPASS Bilateral     UPPER GASTROINTESTINAL ENDOSCOPY N/A 2/19/2021    EGD CONTROL HEMORRHAGE performed by Tran Dhillon MD at [address] History:    Social History     Tobacco Use    Smoking status: Former Smoker     Packs/day: 1.50     Types: Cigarettes     Quit date: 7/23/2017     Years since quitting: 3.6    Smokeless tobacco: Never Used   Substance Use Topics    Alcohol use: No                                Counseling given: Not Answered      Vital Signs (Current):   Vitals:    03/04/21 1854 03/05/21 0600 03/05/21 0736 03/05/21 0800   BP: (!) 188/82 (!) 190/82  (!) 176/76   Pulse: 72 (!) 16  61   Resp: 18 16 16 16   Temp: 98 °F (36.7 °C) 97 °F (36.1 °C)  96.9 °F (36.1 °C)   TempSrc: Oral Oral  Oral   SpO2: 93% 92% 92% 91%   Weight:       Height:                                                  BP Readings from Last 3 Encounters:   03/05/21 (!) 176/76   03/03/21 (!) 161/76   02/20/21 (!) 155/81       NPO Status: Time of last liquid consumption: 0000                        Time of last solid consumption: 0000                        Date of last liquid consumption: 03/04/21                        Date of last solid food consumption: 03/04/21    BMI:   Wt Readings from Last 3 Encounters:   03/04/21 167 lb (75.8 kg)   03/03/21 156 lb (70.8 kg)   02/20/21 156 lb 14.4 oz (71.2 kg)     Body mass index is 22.65 kg/m².     CBC:   Lab Results   Component Value Date    WBC 10.4 03/05/2021    RBC 4.37 03/05/2021    HGB 13.0 03/05/2021    HCT 38.1 03/05/2021    MCV 87.3 03/05/2021    RDW 13.9 03/05/2021     03/05/2021       CMP:   Lab Results   Component Value Date     03/05/2021    K 3.0 03/05/2021     03/05/2021    CO2 26 03/05/2021    BUN 15 03/05/2021    CREATININE 1.0 03/05/2021    GFRAA >60 03/05/2021    AGRATIO 1.2 03/04/2021    LABGLOM >60 03/05/2021    GLUCOSE 109 03/05/2021    PROT 6.5 03/04/2021    CALCIUM 8.6 03/05/2021    BILITOT 0.6 03/04/2021    ALKPHOS 174 03/04/2021    AST 13 03/04/2021    ALT 12 03/04/2021       POC Tests:   Recent Labs     03/05/21  1117   POCGLU 107*       Coags:   Lab Results   Component Value Date    PROTIME 12.4 03/04/2021    INR 1.07 03/04/2021    APTT 29.1 10/13/2017       HCG (If Applicable): No results found for: PREGTESTUR, PREGSERUM, HCG, HCGQUANT     ABGs: No results found for: PHART, PO2ART, KQC5XGW, JBJ7MQC, BEART, X5KZYJJT     Type & Screen (If Applicable):  No results found for: LABABO, LABRH    Drug/Infectious Status (If Applicable):  No results found for: HIV, HEPCAB    COVID-19 Screening (If Applicable):   Lab Results   Component Value Date    COVID19 Not Detected 03/04/2021         Anesthesia Evaluation  Patient summary reviewed and Nursing notes reviewed no history of anesthetic complications:   Airway: Mallampati: III     Neck ROM: full   Dental:          Pulmonary:   (+) COPD:                             Cardiovascular:    (+) hypertension:, CAD:, CHF:,                   Neuro/Psych:   (+) CVA:,             GI/Hepatic/Renal:             Endo/Other:    (+) Diabetes, . Abdominal:           Vascular:                                        Anesthesia Plan      general     ASA 3     (Medications & allergies reviewed  All available lab & EKG data reviewed)  Induction: intravenous. Anesthetic plan and risks discussed with patient. Plan discussed with CRNA.                   Lachelle Wong MD   3/5/2021

## 2021-03-05 NOTE — CARE COORDINATION
CM chart review and screening for DCP needs. Unable to interview pt at this time as he is off the floor to OR for hip surgery. From home with [facility]. Services on hold. CM will follow and reassess pending post op Ortho sx and PT/OT recs.

## 2021-03-05 NOTE — PROGRESS NOTES
Son Gisele Coulter came to the floor. Did not check in at the desk before coming to unit,no visitor pass. Very unset, confrontational because I did not have his dads home med list.  Accused me of not doing my job. Housekeeping in room and present for encounter. Says doctor should be sendtng me a list and pt was just in [facility]. I was just following up from report given to me that we did not have his home medication list.  Informed him to check in at the from desk and pt is only permitted one visitor per day. Son left.

## 2021-03-05 NOTE — PROGRESS NOTES
Patient admitted to room _214__ from ER. Patient oriented to room, call light, bed rails, phone, lights and bathroom. Patient instructed about the schedule of the day including: vital sign frequency, lab draws, possible tests, frequency of MD and staff rounds, daily weights, I &O's and prescribed diet. Bed alarm on patient high fall risk. Bed locked, in lowest position, side rails up 2/4, call light within reach. Recliner Assessment:     Patient is not able to demonstrate the ability to move from a reclining position to an upright position within the recliner due to fracture femur & restricted movement. Aubree Severance 4 Eyes Skin Assessment:     The patient is being assess for   Admission    I agree that 2 RN's have performed a thorough Head to Toe Skin Assessment on the patient. ALL assessment sites listed below have been assessed. Areas assessed by both nurses:   [x]   Head, Face, and Ears   [x]   Shoulders, Back, and Chest, Abdomen  [x]   Arms, Elbows, and Hands   [x]   Coccyx, Sacrum, and Ischium  [x]   Legs, Feet, and Heels        ****Scattered large dark bruises. Scattered abrasion that are scabbed over. Blanchable redness to buttocks. **SHARE this note so that the co-signing nurse is able to place an eSignature**    Co-signer eSignature: {Esignature:[id number]}    Does the Patient have Skin Breakdown?   No          Rick Prevention initiated:  No   Wound Care Orders initiated:  No      [facility] nurse consulted for Pressure Injury (Stage 3,4, Unstageable, DTI, NWPT, Complex wounds)and New or Established Ostomies:  No      Primary Nurse eSignature: Electronically signed by Lucina Jones RN on 3/5/21 at 6:47 AM EST

## 2021-03-06 NOTE — PROGRESS NOTES
Progress Note    Admit Date:  3/4/2021    Admitted with left hip fracture s/p left hip IM nailing. Subjective:  Mr. Radha Maria had diarrhea. No fever. Post op day#1. PT and OT recommend SNF    Objective:   BP (!) 157/58   Pulse 68   Temp 97.2 °F (36.2 °C) (Oral)   Resp 20   Ht 6' (1.829 m)   Wt 167 lb (75.8 kg)   SpO2 99%   BMI 22.65 kg/m²            Intake/Output Summary (Last 24 hours) at 3/6/2021 1309  Last data filed at 3/6/2021 1017  Gross per 24 hour   Intake 2140 ml   Output    Net 2140 ml       Physical Exam:  Gen: No distress. Alert. Elderly  male. [place]  Eyes: No sclera icterus. No conjunctival injection. Neck: Trachea midline. Resp: No accessory muscle use. No crackles. No wheezes. No rhonchi. On RA   CV: Regular rate. Regular rhythm. No murmur. No rub. No edema. GI: Soft, Non-tender. Non-distended. No masses. No organomegaly. Normal bowel sounds. No hernia. Skin: Warm and dry. No rash on exposed extremities. M/S: Left hip TTP. No edema  Neuro: Awake. Grossly nonfocal, follows commands, moves all extremities - as able (limited 2/2 left hip pain), sensation intact to bilateral lower extremities    Psych: No anxiety or agitation.      Scheduled Meds:   magnesium oxide  400 mg Oral Daily    [START ON 3/7/2021] furosemide  20 mg Oral Daily    budesonide-formoterol  2 puff Inhalation BID    sodium chloride flush  10 mL Intravenous 2 times per day    acetaminophen  650 mg Oral Q6H    sennosides-docusate sodium  1 tablet Oral BID    polyethylene glycol  17 g Oral Daily    bisacodyl  5 mg Oral Daily    enoxaparin  40 mg Subcutaneous Daily    amLODIPine  5 mg Oral Nightly    atorvastatin  80 mg Oral Daily    carvedilol  6.25 mg Oral BID    insulin glargine  20 Units Subcutaneous Nightly    ipratropium-albuterol  3 mL Inhalation TID    lisinopril  40 mg Oral Daily    pantoprazole  40 mg Oral QAM AC    trospium  20 mg Oral Nightly    insulin lispro  0-6 Units Subcutaneous TID WC    insulin lispro  0-3 Units Subcutaneous Nightly       Continuous Infusions:      PRN Meds:  sodium chloride flush, promethazine **OR** ondansetron, magnesium hydroxide, oxyCODONE **OR** oxyCODONE, morphine **OR** morphine      Data:  CBC:   Recent Labs     03/04/21  1420 03/05/21  0527 03/06/21  0609   WBC 14.0* 10.4  --    HGB 13.6 13.0* 11.3*   HCT 41.6 38.1* 33.6*   MCV 90.1 87.3  --     194  --      BMP:   Recent Labs     03/04/21  1420 03/05/21  0527 03/06/21  0609    143 141   K 3.4* 3.0* 3.5    108 107   CO2 28 26 27   BUN 17 15 23*   CREATININE 1.0 1.0 1.2     LIVER PROFILE:   Recent Labs     03/04/21  1420 03/06/21  0609   AST 13* 11*   ALT 12 7*   BILITOT 0.6 0.4   ALKPHOS 174* 128     PT/INR:   Recent Labs     03/04/21  1420   PROTIME 12.4   INR 1.07     CULTURES    RAPID COVID: pending     RADIOLOGY    XR HIP 1 VW W PELVIS LEFT 3/4/2021   Final Result   Nondisplaced intertrochanteric fracture of the proximal left femur. Assessment/Plan:    Left Femur Fracture  Mechanical Fall  - XR left hip/pelvis: non-displaced intertrochanteric fx of proximal left femur  - Admit to Med Surg  - SQ Lovenox, PRN pain meds, held ASA & Plavix before surgery. Will resume  - ortho consult    S/P left hip IM nailing on 3/5. Today is post op day#1    CAD  - S/P remote stent  Ischemic CMP  EKG changes   - EKG reviewed - T wave inversion in lateral leads, new compared to prior-  - trop 0.01  - cards consulted for pre-op clearance  EKG changes likely due to hypokalemia .    cont to monitor in tele   - cont Coreg, Lisinopril, Lipitor; hold ASA & Plavix in monet-operative setting     Chronic Combined CHF  - appears compensated  - last echo 9/2017 - EF 45-50%, grade I DD  - cont Coreg, Lisinopril and Lasix    Hypokalemia better  - 3.5  - replete and repeat BMP tomorrow    Hypomagnesemia  - 1.6  - give 2 g Mg  - repeat Mg tomorrow     Leukocytosis - Resolved  - 14  - favor reactive, no concerns for infection  - repeat CBC was normal     Type II DM  - controlled  - cont Lantus 20 u nightly, add low dose SSI; hold Janumet  - POC Glucose, monitor     HTN  - remains uncontrolled, likely 2/2 pain  - cont Norvasc, Coreg Lasix, PRN pain meds for pain control  - add PRN hydralazine        COPD  - no AE  - cont [address] [place], [place]     GERD  - cont Protonix     Recent Admission to [facility]   - on 2/19 for hematemesis  - underwent EGD which was unremarkable for active bleeding, unable to r/o small Tracey-Clara tear    DVT Prophylaxis: Lovenox  Diet: DIET CARB CONTROL;  Code Status: Full Code    Constantine Garcia MD  3/6/2021

## 2021-03-06 NOTE — PROGRESS NOTES
proximal L femur  Patient underwent IM nailing 3/5/21  Home Health S4 Level Recommendation:  NA  AM-PAC Mobility Score    AM-PAC Inpatient Mobility Raw Score : 9       Preadmission Environment    Pt. Lives Alone,  Son ,who is not currently there and not expected back until next week, lives in an apartment above patient. Daughter and another son check on patient often  Home environment:  apartment , sons apartment is above  Steps to enter first floor: 1 4 inch step steps to enter  Steps to second floor: N/A  Bathroom: tub/shower unit and grab bars, standard toilet  Equipment owned: RW, SW, shower chair/bench and [facility]    Preadmission Status:  Pt. Able to drive: Yes  Pt Fully independent with ADLs: Yes  Pt. Required assistance from family for: Cleaning, Cooking and Laundry   Pt. independent for transfers and gait and walked with [employer]  History of falls no other falls previous to this    Pain   No pain during bed mobility and transfers from bed to chair via stedy. Reported some mild pain with WB during standing during transfer back to bed      Cognition    A&O Person, Place and Situation( hospital, not name)   Able to follow 1 step commands   While gathering information about patients PLOF, patient became very angry, stating that he does not want to go anywhere but home. He responded to well to this writers instruction about how we gather information and make decisions. He remaind calm throughout the rest of the session. Subjective  Patient lying supine in bed with no family present. Pt agreeable to this PT eval & tx. Upper Extremity ROM/Strength  Please see OT evaluation. Lower Extremity ROM / Strength   AROM WFL: No  ROM limitations: limited L hip and knee ROM  Not formally assessed. Able to demonstrate weight bearing in BLE in standing  Patient was able to demonstrate 3+/5 strength in RLE.   LLE limited against gravity and required assist to move LLE within the bed    Lower Extremity Sensation Diminished, reports decreased sensation in LLE, compared to RLE, to light touch. Denies numbness and tingling    Lower Extremity Proprioception:   NT    Coordination and Tone  NT    Balance  Sitting:  Fair -; Mod A  and 2 persons  Comments: heavy, inconsistant  posterior lean , corrected with VC and assist    Standing: Poor; Mod A  and 2 persons  Comments: heavy lean with BLE against bed, WB through heels, unable to advance step to clear bed or step in place    Bed Mobility patient requested increased time to complete tasks  Supine to Sit:    Max A  and 2 persons, HOB elevated, used handrail, VC  Sit to Supine:   Max A  and 2 persons  Rolling: Max A   Scooting in sitting: Max A  to EOB  Scooting in supine:  Max A  and 2 persons to [facility]    Transfer Training  For transfer back to bed, PCA present to observe transfer                                       Patient requests increased time to complete transfers   Sit to stand: Max A  and 2 persons from EOB to RW and stedy form EOB                                      Mod Ax2 from chair to [employer] Ax2 from stedy paddles  Stand to sit:   Mod A  and 2 persons to EOB, chair ,for controlled descent                                      Min A x2 to stedy paddles  Bed to Chair:   Total A and 2 persons with use of PRANAV STEDY    Gait gait deferred due to difficulty with transfers; pt ambulated 0 ft. Practiced marching in stedy.  Patient as unable to weigh tshift onto LLE to clear R foot    Stair Training deferred, pt unsafe/ not appropriate to complete stairs at this time  Activity Tolerance   Pt completed therapy session with No adverse symptoms noted w/activity, mild pain with weight shifting onto L LE   BP HR SpO2 Patient Comments   Supine, at rest  68bpm 92% on RA No complaint   Seated at /72 70bpm 96%2 L No complaint   End of session 120/57 70bpm       Positioning Needs   Reclined in chair with chair alarm activated after initial session, and all needs in reach. At conclusion of return session, patient in bed, all needs in reach, bed alarm activated, cardiologist present    Exercises Initiated    Assisted hip abduction with LLE, seated hip flexion and LAQ    Other  Discussed discharge recommendations with patient after returning patient to bed. Patient wishes to return to home, but discussed the safety concerns in returning to home as patient is currently unable to ambulate. Requested that patient consider rehab. Opted to return to provide assist back to bed as patient was anxious about being left in the chair too long. Patient/Family Education   Pt educated on role of inpatient PT, POC, importance of continued activity, DC recommendations, safety awareness, transfer techniques and calling for assist with mobility. Assessment  Pt seen for Physical Therapy evaluation in acute care setting. Pt demonstrated decreased Activity tolerance, Balance, ROM, Safety and Strength as well as decreased independence with Ambulation, Bed Mobility  and Transfers. Patient will benefit from skilled PT intervention to maximize transfers, safety, bed mobility, strength and balance  Recommending ARU/IRF/Inpatient Rehab Facility upon discharge as patient functioning well below baseline, demonstrates good rehab potential and unable to return home due to inability to negotiate stairs to enter home/bedroom/bathroom, burden of care beyond caregiver ability and home environment not conducive to patient recovery. Goals : To be met in 3 visits:  1). Transfer bed to chair with Mod A and RW    To be met in 6 visits:  1). Supine to/from sit: Min A   2). Sit to/from stand: Min A   3). Bed to chair: Min A   4). Gait: Ambulate 50 ft.   with  Min A  and use of rolling walker (RW)  5). Tolerate B LE exercises 3 sets of 10-15 reps  6).   Ascend/descend 1 steps with Min A  with use of no handrail     Rehabilitation Potential: Good  Strengths for achieving goals include:   Pt motivated, PLOF and Pt cooperative   Barriers to achieving goals include:    Complexity of condition and Weakness    Plan    To be seen 3-5 x / week  while in acute care setting for therapeutic exercises, bed mobility, transfers, progressive gait training, balance training, and family/patient education. Signature: Lakeshia Harmon, PT #[id number]    If patient discharges from this facility prior to next visit, this note will serve as the Discharge Summary.

## 2021-03-06 NOTE — ANESTHESIA POSTPROCEDURE EVALUATION
Department of Anesthesiology  Postprocedure Note    Patient: Corwin Jessica  MRN: [id number]  YOB: 1945  Date of evaluation: 3/5/2021  Time:  7:01 PM     Procedure Summary     Date: 03/05/21 Room / Location: [place] [address] / [facility]    Anesthesia Start: 4050 Anesthesia Stop: 0001    Procedure: LEFT FEMUR INTRAMEDULLARY NAIL GRAZYNA INSERTION (Left Hip) Diagnosis: (LEFT INTERTROCH FRACTURE)    Surgeons: Yasmani Joe MD Responsible Provider: Marlene Gold MD    Anesthesia Type: general ASA Status: 3          Anesthesia Type: general    Arnoldo Phase I: Arnoldo Score: 9    Arnoldo Phase II:      Last vitals: Reviewed and per EMR flowsheets.        Anesthesia Post Evaluation    Comments: Postoperative Anesthesia Note    Name:    Corwin Jessica  MRN:      [id number]    Patient Vitals in the past 12 hrs:  03/05/21 1600, BP:(!) 166/71, Temp:97.1 °F (36.2 °C), Temp src:Oral, Pulse:65, Resp:16, SpO2:94 %  03/05/21 1551, BP:(!) 155/74, Temp:97.4 °F (36.3 °C), Temp src:Infrared, Pulse:67, Resp:14, SpO2:94 %  03/05/21 1545, BP:(!) 155/74, Pulse:66, Resp:17, SpO2:95 %  03/05/21 1530, BP:(!) 170/82, Pulse:71, Resp:17, SpO2:94 %  03/05/21 1520, BP:(!) 169/70, Pulse:72, Resp:18  03/05/21 1518, BP:(!) 169/70, Pulse:73, Resp:22  03/05/21 1515, BP:(!) 169/70, Temp:97.2 °F (36.2 °C), Pulse:74, Resp:11, SpO2:97 %  03/05/21 1513, BP:(!) 169/70, Pulse:69, Resp:16, SpO2:95 %  03/05/21 1508, BP:(!) 157/71, Temp:97.8 °F (36.6 °C), Temp src:Infrared, Pulse:70, Resp:17, SpO2:95 %  03/05/21 1504, BP:132/61, Pulse:70, Resp:22, SpO2:92 %  03/05/21 1500, BP:132/61, Pulse:69, Resp:15, SpO2:90 %  03/05/21 1455, BP:(!) 119/50, Pulse:65, Resp:17, SpO2:100 %  03/05/21 0800, BP:(!) 176/76, Temp:96.9 °F (36.1 °C), Temp src:Oral, Pulse:61, Resp:16, SpO2:91 %  03/05/21 0736, Resp:16, SpO2:92 %     LABS:    CBC  Lab Results       Component                Value               Date/Time                  WBC 10.4                03/05/2021 05:27 AM        HGB                      13.0 (L)            03/05/2021 05:27 AM        HCT                      38.1 (L)            03/05/2021 05:27 AM        PLT                      194                 03/05/2021 05:27 AM   RENAL  Lab Results       Component                Value               Date/Time                  NA                       143                 03/05/2021 05:27 AM        K                        3.0 (L)             03/05/2021 05:27 AM        CL                       108                 03/05/2021 05:27 AM        CO2                      26                  03/05/2021 05:27 AM        BUN                      15                  03/05/2021 05:27 AM        CREATININE               1.0                 03/05/2021 05:27 AM        GLUCOSE                  109 (H)             03/05/2021 05:27 AM   COAGS  Lab Results       Component                Value               Date/Time                  PROTIME                  12.4                03/04/2021 02:20 PM        INR                      1.07                03/04/2021 02:20 PM        APTT                     29.1                10/13/2017 11:07 PM     Intake & Output: In: 900 (I.V.:900)  Out: 150 (Urine:50)    Nausea & Vomiting:  No    Level of Consciousness:  Awake    Pain Assessment:  Adequate analgesia    Anesthesia Complications:  No apparent anesthetic complications    SUMMARY      Vital signs stable  OK to discharge from Stage I post anesthesia care.   Care transferred from Anesthesiology department on discharge from perioperative area

## 2021-03-06 NOTE — CARE COORDINATION
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 [S72.142K]  Date / Time: 3/4/2021 11:36 AM    Admission status/Date:3/4/21 inpt  Chart Reviewed: Yes      Patient Interviewed: Yes   Family Interviewed:  No      Hospitalization in the last 30 days:  No      Health Care Decision Maker :     (CM - must 1st enter selection under Navigator - emergency contact- Health Care Decision Maker Relationship and pick relationship)   Who do you trust or have selected to make healthcare decisions for you      Met with:  Patient at bedside  Interview conducted  (bedside/phone):    Current PCP: [address] required for SNF : Y          3 night stay required - [facility]  Support Systems/Care Needs: Family Members  Transportation: family    Meal Preparation: self    Housing  Living Arrangements: lives [address] [place] [address]  Steps: 1  Intent for return to present living arrangements: Yes  Identified Issues:     [address] with [address] : Yes - Unsure of name Agency:(Services)  Type of Home Care Services: Meals on Wheels, Nursing Services, Skilled Therapy  Passport/Waiver : No  :                      Phone Number:    Passport/Waiver Services: N/A          Durable Medical Equiptment   DME Provider:   Equipment:   Walker_x__Cane_x__RTS___ BSC___Shower Chair_x__Hospital Bed___W/C____Other________  02 at ____Liter(s)---wears(frequency)_______ HHN ___ CPAP___ BiPap___   N/A____      Home O2 Use :  No    If No for home O2---if presently on O2 during hospitalization:  No  if yes CM to follow for potential DC O2 need  Informed of need for care provider to bring portable home O2 tank on day of discharge for nursing to connect prior to leaving:   Not Indicated  Verbalized agreement/Understanding:   Not Indicated    [facility] Service Affiliation Dialysis:  No    · Agency:  · Location:  · Dialysis Schedule:  · Phone:   · Fax: Other Community Services: (ex:PT/OT,Mental Health,Wound Clinic, Cardio/Pul [address]) none    DISCHARGE PLAN: Explained Case Management role/services. reviewd chart and met with pt at bedside. Role of CM explained. \[facility]\"" lives home with son who is currently in [facility] and due to be released next week. \[facility]\"" home alone til then. \[facility]\"" has [facility] for SN, PT/OT but unsure agency. Typically able to care for self and drive per pt. Discussed that he is unable to care for self at this time and will need SNF at [place]. Pt declining but staets will consider. Again reminded pt unable to ambulate at this time. Will cont to follow for SNF needs.

## 2021-03-06 NOTE — PROGRESS NOTES
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 [S72.142K]  Admit Date:  3/4/2021  Precautions/Restrictions/WB Status/ Lines/ Wounds/ Oxygen: fall risk, IV, bed/chair alarm, jefferson catheter , supplemental O2 (2L), WB restrictions (FWB)  and Torres Martinez (hard of hearing)  FWB at tolerated R LE    Treatment Time:  830-930 and 2346-3011  Treatment Number: 1     Billable Treatment Time: 70 minutes   Total Treatment Time:   80   minutes    Patient Goals for Therapy:  \" to go home \"      Discharge Recommendations: SNF  DME needs for discharge: defer to facility       Therapy recommendations for staff:   Assist of 2 with use of PRANAV STEDY for all transfers to/from BSC/chair    History of Present Illness: The patient is a 73 y. o. male with a PMH of CAD s/p remote stent, COPD, Type II DM, HLD, HTN who presented to [facility] with complaint of mechanical fall while trying to get out of his truck. Reports his foot landed in some mud and he got stuck and fell on his left side. He was unable to ambulate following the fall. Reported severe left hip pain. Denied any chest pain, dizziness, SOB or lightheadedness prior to the fall. Did not hit his head. No loss of consciousness. Denies numbness or tingling to BLEs.    Hx does have a hx of CAD s/p stent. He does take ASA and Plavix. No hx of VTE that pt is aware of. He is a current smoker. Denies active chest pain or SOB. Denies any knowledge of issues with prior anesthesia.   PMH: arthritis, CAD, COPD, DM, HTN, s/p angioplasty with stent, B fem BPG, Upper GI EGD control hemorrhage     X-ray L hip/pelvis 3/4/21:  Nondisplaced intertrochanteric fx proximal L femur  Patient underwent IM nailing 3/5/21    Home Health S4 Level Recommendation:  Level 3 Safety  AM-PAC Score: AM-PAC Inpatient Daily Activity Raw Score: 13 Preadmission Environment    Pt. Lives Alone,  Son ,who is not currently there and not expected back until next week, lives in an apartment above patient. Daughter and another son check on patient often  Home environment:            apartment , sons apartment is above  Steps to enter first floor: 1 4 inch step steps to enter  Steps to second floor:         N/A  Bathroom: tub/shower unit and grab bars, standard toilet  Equipment owned: RW, , shower chair/bench and [employer]     Preadmission Status:  Pt. Able to drive: Yes  Pt Fully independent with ADLs: Yes  Pt. Required assistance from family for: Cleaning, Cooking and Laundry   Pt. independent for transfers and gait and walked with [employer]  History of falls          no other falls previous to this     Pain   No pain during bed mobility and transfers from bed to chair via stedy. Reported some mild pain with WB during standing during transfer back to bed        Cognition    A&O Person, Place and Situation( hospital, not name)   Able to follow 1 step commands   While gathering information about patients PLOF, patient became very angry, stating that he does not want to go anywhere but home. He responded to well to this writers instruction about how we gather information and make decisions. He remaind calm throughout the rest of the session.     Subjective  Patient lying supine in bed with no family present  Pt agreeable to this OT eval & tx. Upper Extremity ROM:    WFL,  pt able to perform all bed mobility, transfers, and gait without ROM limitation. Upper Extremity Strength:    BUE strength WFL, but not formally assessed w/ MMT    Upper Extremity Sensation    WFL    Upper Extremity Proprioception:  WFL    Coordination and Tone  WFL    Balance  Functional Sitting Balance:  Impaired mod A x2 persons initially posterior lean  Functional Standing Balance:Impaired Mod A x2 with posterior lean initially at EOB with RW. Patient states \"dont let me fall\".  Used STEDY for second stand and transfer     Bed mobility:    Supine to sit: Max A of 2, HOB elevated and HR with verbal cues  Sit to supine:   Max A of 2   Rolling: Max A  Scooting in sitting: Max A  Scooting to head of bed:   Max A of 2    Bridging:   Not Tested    Transfers:    Sit to stand: Max A of 2 from bed with RW, Mod A x2 from STEDY to/from chair, Min A x2 from STEDY paddles. Stand to sit:  Mod A of 2 from bed with RW, Mod A x2 from STEDY to/from chair, Min A x2 from STEDY paddles  Bed to chair:   Total A via Stedy  Standard toilet: Not Tested  Bed to [facility]:  Not Tested    Dressing:      UE:   Not Tested  LE:    Total A     Bathing:    UE:  Not Tested  LE:  Not Tested    Eating:   Independent, dring    Toileting:  Not Tested, jefferson    Activity Tolerance   Pt completed therapy session with Pain noted with transfers   BP HR SpO2 Patient Comments   Supine, at rest   68bpm 92% on RA No complaint   Seated at /72 70bpm 96%2 L No complaint   End of session 120/57 70bpm          Positioning Needs:   Reclined in chair with call light and needs in reach. Alarm Set after first session,   Assisted back to bed after second session    Exercise / Activities Initiated:   N/A    Patient/Family Education:   Role of OT  Recommendations for DC  Use of AE  Safe RW use/hand placement    Assessment of Deficits: Pt seen for Occupational therapy evaluation in acute care setting. Pt demonstrated decreased Activity tolerance, ADLs, IADLs, Balance , Bed mobility, Dressing, Safety Awareness, Strength, Transfers and Coping Skills. Pt functioning below baseline and will likely benefit from skilled occupational therapy services to maximize safety and independence. Goal(s) : To be met in 3 Visits:  1). Bed to toilet/BSC: Mod A    To be met in 5 Visits:  1). Supine to/from Sit:  Mod A  2). Upper Body Bathing:   SBA  3). Lower Body Bathing: Mod A  4). Upper Body Dressing:  SBA  5). Lower Body Dressing: Mod A  6).  Pt to demonstrate UE exs x 15 reps with minimal cues    Rehabilitation Potential:  Good for goals listed above. Strengths for achieving goals include: Pt motivated  Barriers to achieving goals include:  Pain     Plan: To be seen 3-5 x/wk while in acute care setting for therapeutic exercises, bed mobility, transfers, dressing, bathing, family/patient education, ADL/IADL retraining, energy conservation training.      Ashely Baker, OTR/L [id number]    If patient discharges from this facility prior to next visit, this note will serve as the Discharge Summary

## 2021-03-06 NOTE — PROGRESS NOTES
[address]  Progress  [phone]        Reason for Consultation/Chief Complaint: \"I have been having left hip pain\"    History of Present Illness:  Cara Shelby is a 66 y.o. patient who presented to the hospital with complaints of a fall and left hip pain. No acute events overnight. Patient reports feeling good today. No chest pain or pressure. We discussed quitting smoking. Review of epic chart as follows. Seen in the past by Dr. Katy Malhotra. PMH: Ischemic CMP, CAD,  HTN, DM 2, HLD, COPD, PVD, CKD.   2001 [address] (EF.48, successful PTA of the left external illiac artery. successful stent placement, exernal illiac artry.)    history of anteroseptal myocardial infarction with bare metal stent in 2001. Ejection fraction at that time 20-25%. Past Medical History:   has a past medical history of Arthritis, CAD (coronary artery disease), COPD (chronic obstructive pulmonary disease) ([facility] [address] Diabetes mellitus ([facility] [address] Hyperlipidemia, and Hypertension. Surgical History:   has a past surgical history that includes Coronary angioplasty with stent; femoral bypass (Bilateral); Upper gastrointestinal endoscopy (N/A, 2/19/2021); and other surgical history. Social History:   reports that he quit smoking about 3 years ago. His smoking use included cigarettes. He smoked 1.50 packs per day. He has never used smokeless tobacco. He reports that he does not drink alcohol or use drugs. Family History:  Denies family history of heart disease    Home Medications:  Were reviewed and are listed in nursing record. and/or listed below  Prior to Admission medications    Medication Sig Start Date End Date Taking?  Authorizing Provider   albuterol sulfate HFA (PROAIR HFA) 108 (90 Base) MCG/ACT inhaler Inhale 2 puffs into the lungs every 6 hours as needed for Wheezing 4/9/18   Brando Hay MD   acetaminophen (TYLENOL) 325 MG tablet Take 2 tablets by mouth every 4 hours as needed for Pain or Fever Maximum dose of acetaminophen is 4000 mg from all sources in 24 hours. 10/3/17   Saul Baltazar MD   ipratropium-albuterol (DUONEB) 0.5-2.5 (3) MG/3ML SOLN nebulizer solution Inhale 3 mLs into the lungs 3 times daily 10/3/17   Saul Baltazar MD   mometasone-formoterol [facility]) 200-5 MCG/ACT inhaler Inhale 2 puffs into the lungs 2 times daily Substituted for Budesonide-Formoterol (SYMBICORT).  10/3/17   Saul Baltazar MD   atorvastatin (LIPITOR) 80 MG tablet Take 1 tablet by mouth daily 10/3/17   Saul Baltazar MD   amLODIPine (NORVASC) 10 MG tablet Take 0.5 tablets by mouth nightly Note: new lower dose 10/3/17   Saul Baltazar MD   furosemide (LASIX) 40 MG tablet Take 1 tablet by mouth daily 10/3/17   Saul Baltazar MD   [facility]) 60 MG CP24 extended release capsule Take 1 capsule by mouth daily 10/3/17   Saul Baltazar MD   insulin glargine (LANTUS SOLOSTAR) 100 UNIT/ML injection pen Inject 20 Units into the skin nightly 10/3/17   Saul Baltazar MD   insulin lispro (HUMALOG KWIKPEN) 100 UNIT/ML pen Inject 0-6 Units into the skin 3 times daily (before meals) Low Dose Correction Algorithm: BS  No Insulin, -199 1 Unit, 200-249 2 Units, 250-299 3 Units, 300-349 4 Units, 350-399 5 Units, 400 and above give 6 Units 10/3/17   Saul Baltazar MD   carvedilol (COREG) 6.25 MG tablet Take 6.25 mg by mouth 2 times daily    Historical Provider, MD   aspirin 81 MG chewable tablet Take 81 mg by mouth daily    Historical Provider, MD   lisinopril (PRINIVIL;ZESTRIL) 40 MG tablet Take 40 mg by mouth daily    Historical Provider, MD   pantoprazole sodium (PROTONIX) 40 MG PACK packet Take 40 mg by mouth every morning (before breakfast)    Historical Provider, MD   SITagliptin-MetFORMIN HCl ER (JANUMET XR) 100-1000 MG TB24 Take 1 tablet by mouth daily    Historical Provider, MD   albuterol sulfate HFA (PROVENTIL HFA) 108 (90 BASE) MCG/ACT inhaler Inhale 2 puffs into the lungs every 4 hours as needed for Wheezing or Shortness of Breath With spacer (and mask if indicated). Thanks. 12/24/16 3/29/17  Nithin Red MD   clopidogrel (PLAVIX) 75 MG tablet Take 75 mg by mouth daily    Historical Provider, MD        Allergies:  No known allergies     Review of Systems:   12 point ROS negative in all areas as listed below except as in [place]  Constitutional, EENT, pulmonary, GI, , skin, neurological, hematological, endocrine, Psychiatric    Physical Examination:    Vitals:    03/06/21 0735   BP: (!) 148/62   Pulse: 54   Resp: 16   Temp: 96.5 °F (35.8 °C)   SpO2: 99%    Weight: 167 lb (75.8 kg)         General Appearance:  Alert, cooperative, no distress, appears stated age   Head:  Normocephalic, without obvious abnormality, atraumatic   Eyes:  PERRL, conjunctiva/corneas clear       Nose: Nares normal, no drainage or sinus tenderness   Throat: Lips, mucosa, and tongue normal   Neck: Supple, symmetrical, trachea midline, no adenopathy, thyroid: not enlarged, symmetric, no tenderness/mass/nodules, no carotid bruit or JVD       Lungs:   Crackles posterior chest  to auscultation bilaterally, respirations unlabored   Chest Wall:  No tenderness or deformity   Heart:  Regular rate and rhythm, S1, S2 normal, no murmur, rub or gallop   Abdomen:   Soft, non-tender, bowel sounds active all four quadrants,  no masses, no organomegaly           Extremities: Extremities normal, atraumatic, surgical incision    Pulses: 1+ and symmetric   Skin: Skin color, texture, turgor normal, no rashes or lesions   Pysch: Normal mood and affect   Neurologic: Normal gross motor and sensory exam.         Labs  CBC:   Lab Results   Component Value Date    WBC 10.4 03/05/2021    RBC 4.37 03/05/2021    HGB 11.3 03/06/2021    HCT 33.6 03/06/2021    MCV 87.3 03/05/2021    RDW 13.9 03/05/2021     03/05/2021     CMP:    Lab Results   Component Value Date     03/06/2021    K 3.5 03/06/2021     03/06/2021    CO2 27 03/06/2021    BUN 23 03/06/2021    CREATININE 1.2 03/06/2021    GFRAA >60 03/06/2021    AGRATIO 0.9 03/06/2021    LABGLOM 59 03/06/2021    GLUCOSE 219 03/06/2021    PROT 5.5 03/06/2021    CALCIUM 8.2 03/06/2021    BILITOT 0.4 03/06/2021    ALKPHOS 128 03/06/2021    AST 11 03/06/2021    ALT 7 03/06/2021     PT/INR:  No results found for: PTINR  Lab Results   Component Value Date    CKTOTAL 23 (L) 10/13/2017    TROPONINI 0.01 03/04/2021     I have reviewed the following tests and documented in this encounter as follows:   Discussed with patient  EKG:  3/4/2021  Normal sinus rhythmPossible Left atrial enlargementAnterolateral infarct (cited on or before 25-JUL-2017)Abnormal ECGWhen compared with ECG of 19-FEB-2021 09:22,ST no longer depressed in Inferior leadsNonspecific T wave abnormality now evident in Inferior leadsInverted T waves have replaced nonspecific T wave abnormality in Lateral leadsConfirmed by Jason Pérez MD, [address] (1988) on 3/4/2021 5:52:43 PM     Hip Xray 3/4/2021  Nondisplaced intertrochanteric fracture of the proximal left femur. Chest CT 2/19/21  FINDINGS:   Lungs/pleura: The central airways are patent. Is a 7 cm right upper lobe for spine abnormalities seen on previous chest   x-ray. No additional pulmonary nodules masses are identified. There are calcified pleural plaques at the left base consistent prior   asbestos exposure. 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 Chest Xray 11/15/2018  Trace amount of bilateral pleural fluid without significant effusion. No evidence of acute cardiopulmonary disease otherwise. ECHO 9/25/2017 ([facility])  Summary:  Overall left ventricular ejection fraction is estimated to be 45-50%.   The left ventricular function is mildly reduced. The anteroseptum wall is mildly hypokinetic. The diastolic function is impaired and classified as Grade 1  (impaired relaxation). The left atrium is moderately dilated. Mild tricuspid regurgitation is present. There is no obvious cardiac source of emboli noted. VL Carotids ([facility]) 9/26/2017  Impression:   1. < 50% stenosis of the right internal carotid artery based on velocity      criteria. 2. < 50% stenosis of the left internal carotid artery based on velocity      criteria. 3. There is antegrade flow demonstrated in the right and left vertebral      arteries. [address] (EF.48, successful PTA of the left external illias artery. successful stent placemnet, exernal illiac artry.)      history of anteroseptal myocardial infarction with bare metal stent in 2001. Ejection fraction at that time 20-25%. Assessment  1. Fracture left hip  2. CAD stable   3. PAD   4. Pulmonary malignancy on chest xray from Feb 2021  5. Ischemic cardiomyopathy  6 Hypomagnesemia and hypokalemia  7. Abnormal EKG changes could be due to hypokalemia    Patient Active Problem List   Diagnosis    Arthritis    Diabetes mellitus ([address]    Hypertension    Hyperlipidemia    Ischemic stroke ([address]    Acute on chronic combined systolic and diastolic congestive heart failure ([address]    COPD (chronic obstructive pulmonary disease) ([address]    Tobacco use    GI bleeding    Pulmonary nodule    CAD (coronary artery disease)    Closed nondisplaced intertrochanteric fracture of left femur ([address]    Fall    Hypokalemia    Leukocytosis         Plan:    I had the opportunity to review the clinical symptoms and presentation of Kailee Montes. 1. Post operative  - doing well  Plan:  - continue post op care    2.  Coronary artery disease with mildly decreased EF  - stable  Plan:  - stop IVF   - continue coreg, asa, statin, lisinopril   - restart clopidogrel when okay with surgery  - needs to follow up

## 2021-03-06 NOTE — PROGRESS NOTES
This is the 2nd time a complete bed change needed. Incont of a large amount of urine. Condom cath applied. Pt does not call for urinal or use it himself.

## 2021-03-07 NOTE — DISCHARGE INSTR - COC
Continuity of Care Form    Patient Name: Alessandra Brown   :  1945  MRN:  [id number]    Admit date:  3/4/2021  Discharge date:  2021    Code Status Order: Full Code   Advance Directives:   Advance Care Flowsheet Documentation       Date/Time Healthcare Directive Type of Healthcare Directive Copy in [address] Agent's Name Healthcare Agent's Phone Number    21 1243  Yes, patient has an advance directive for healthcare treatment --  No, copy requested from medical records -- -- --            Admitting Physician:  Gallo Agosto MD  PCP: Shruthi Correa    Discharging Nurse: [facility] Unit/Room#: 0214/0214-01  Discharging Unit Phone Number: [phone]    Emergency Contact:   Extended Emergency Contact Information  Primary Emergency Contact: [address] [address] [address] Phone: [phone]  Relation: Child  Secondary Emergency Contact: [address] Phone: [phone]  Relation: Child    Past Surgical History:  Past Surgical History:   Procedure Laterality Date    CORONARY ANGIOPLASTY WITH STENT PLACEMENT      FEMORAL BYPASS Bilateral     OTHER SURGICAL HISTORY      LEFT FEMUR INTRAMEDULLARY NAIL GRAZYNA INSERTION (Left Hip    UPPER GASTROINTESTINAL ENDOSCOPY N/A 2021    EGD CONTROL HEMORRHAGE performed by Hailey Montano MD at [address] [place] [address]       Immunization History:   Immunization History   Administered Date(s) Administered    Influenza, MDCK Quadv, IM, PF (Flucelvax 4 yrs and older) 2017    Tdap (Boostrix, Adacel) 2016       Active Problems:  Patient Active Problem List   Diagnosis Code    Arthritis M19.90    Diabetes mellitus ([facility] [address] E11.9    Hypertension I10    Hyperlipidemia E78.5    Ischemic stroke ([facility] [address] I63.9    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    COPD (chronic obstructive the diagnosis listed and that he requires [place] for less 30 days. Update Admission H&P: No change in H&P    PHYSICIAN SIGNATURE:  Electronically signed by Axel Painter MD on 3/8/21 at 12:19 PM EST              Discharge Instructions    To prevent Clot formation, you have been placed on the following medication:  Lovenox 40mg daily subcutaneously for 20 days   Surgical Site Care:  Dressing change every 5-7 days with mepilex dressing. Can be changed at home until incision healed  If you have staples or sutures, these will be removed on post-operative day 10-12 and steri-strips applied  If you have glue, this will be removed in the office or gradually wear off as your incision heals  Showering is permitted if waterproof mepilex dressing is applied or when staples are removed and all areas of incision are healed. Physical Therapy:  Weight Bearing Status:     Weight bearing as tolerated  3 times per week via Home Health  Precautions  Per Physical Therapy handout  Pain Medications  You were given oxycodone (Oxycontin, Oxyir)  Wean off pain medications as you deem appropriate as long as pain is under control  Be sure to drink plenty of fluids (recommend water) while taking narcotic pain medications to prevent constipation  You may take an over the counter laxative or stool softener as needed to prevent/treat constipation as well, we recommend Senokot S OTC. We recommend that you consider taking these medications the entire time you are taking pain medication. Cold packs/Ice packs/Machine  May be used as much as necessary to reduce swelling/inflammation/soreness  Be sure to have a barrier (cloth, clothing, towel) between your skin/incision and the ice pack to prevent frostbite  Contact [facility] office if  Increased redness, swelling, drainage of any kind, and/or pain to surgery site. As well as new onset fevers and or chills. These could signify an infection.   Calf or thigh tenderness to touch as well as increased swelling or redness. This could signify a clot formation. Numbness or tingling to an area around the incision site or below the incision site (toes). Any rash appears, increased  or new onset nausea/vomiting occur. This may indicate a reaction to a medication. Phone # [phone]. [address] and [facility]. Follow up with Dr. Hugo Allan in 2 weeks, call above number for appointment  Please continue to use your Incentive Spirometer at home every hour while awake.

## 2021-03-07 NOTE — PROGRESS NOTES
Department of Orthopedic Surgery  Physician Assistant   Progress Note    Subjective:       Systemic or Specific Complaints:  Most concerned about diarrhea this am.  He is sitting up in the chair, states therapy still was offering assist to get OOB    Objective:     Patient Vitals for the past 24 hrs:   BP Temp Temp src Pulse Resp SpO2   03/07/21 1001 (!) 160/70 97.9 °F (36.6 °C) Oral 58 16 95 %   03/07/21 0655      93 %   03/07/21 0404 (!) 175/75 97 °F (36.1 °C) Oral 55 16 94 %   03/06/21 2130 (!) 145/69 97.1 °F (36.2 °C) Oral 55 18 93 %   03/06/21 1914     18 95 %   03/06/21 1628 (!) 147/75 97 °F (36.1 °C) Oral 57 18 94 %       General: alert, appears stated age, cooperative and no distress   Wound: Wound clean and dry no evidence of infection. Motion: Painful range of Motion in affected extremity   DVT Exam: No evidence of DVT seen on physical exam.     Additional exam: NVI to left LE  Thigh soft but swollen  Moving toes and ankle without difficulty. Data Review  CBC:   Lab Results   Component Value Date    WBC 10.4 03/05/2021    RBC 4.37 03/05/2021    HGB 11.3 03/06/2021    HCT 33.6 03/06/2021     03/05/2021       Renal:   Lab Results   Component Value Date     03/06/2021    K 3.5 03/06/2021     03/06/2021    CO2 27 03/06/2021    BUN 23 03/06/2021    CREATININE 1.2 03/06/2021    GLUCOSE 219 03/06/2021    CALCIUM 8.2 03/06/2021            Assessment:      left hip IM nailing, POD #2. Plan:      1:  Continue current plan of care per IM. Labs reviewed and stable yesterday. Cardiology following pre op. 2:  Continue Deep venous thrombosis prophylaxis- lovenox  3:  Continue Pain Control PRN  4:  PT and OT, WBAT. No hip ROM precautions  5:  D/c planning pending progress and therapy recs, therapy recommending SNF. Pt refusing yesterday. I discussed with patient today, he states his son works and would be home alone.   This is not safe for patient at this time and would strongly recommend SNF placement. DCP following  6:  Okay to remove jefferson today  7:   Instructions placed    Andrew Carpio PA-C

## 2021-03-07 NOTE — PROGRESS NOTES
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 [S72.142K]  Admit Date:  3/4/2021  Precautions/Restrictions:  fall risk, IV, bed/chair alarm, jefferson catheter , WB restrictions (FWB)  and [place] (hard of hearing)  FWB as tolerated R LE        Discharge Recommendations: Acute Rehab (ARU)/ Inpatient Rehab Facility (IRF)  DME needs for discharge: defer to facility       Therapy recommendations for staff:   Assist of 2 with use of PRANAV STEDY for all transfers to/from BSC/chair    AM-PAC Score: AM-PAC Inpatient Daily Activity Raw Score: 13  Home Health S4 Level: NA       Treatment Time:  870-475  Treatment number:  2   Total Treatment Time:   54 minutes    History of Present Illness: The patient is a 73 y. o. male with a PMH of CAD s/p remote stent, COPD, Type II DM, HLD, HTN who presented to [facility] with complaint of mechanical fall while trying to get out of his truck. Reports his foot landed in some mud and he got stuck and fell on his left side. He was unable to ambulate following the fall. Reported severe left hip pain. Denied any chest pain, dizziness, SOB or lightheadedness prior to the fall. Did not hit his head. No loss of consciousness. Denies numbness or tingling to BLEs.    Hx does have a hx of CAD s/p stent. He does take ASA and Plavix. No hx of VTE that pt is aware of. He is a current smoker. Denies active chest pain or SOB. Denies any knowledge of issues with prior anesthesia. PMH: arthritis, CAD, COPD, DM, HTN, s/p angioplasty with stent, B fem BPG, Upper GI EGD control hemorrhage     X-ray L hip/pelvis 3/4/21:  Nondisplaced intertrochanteric fx proximal L femur  Patient underwent IM nailing 3/5/21    Subjective:  Was found supine in bed. Was agreeable to OT tx.     Pain   Yes  Rating: moderate with movement, severe with WB  Location:L hip, with activity

## 2021-03-07 NOTE — PROGRESS NOTES
FSBS 69. Orange juice given and pt is currently eating dinner. Replaced external condom cath as pt is incontinent and continuously soaking the bed r/t incontinence. Pt states that this happens at home, too. Full bed and gown change, diaper changed, and pt repositioned to sitting up in bed to eat dinner. Scheduled tylenol given, pt states pain is currently at 2/10. No other needs voiced at this time. Will monitor.

## 2021-03-07 NOTE — PROGRESS NOTES
Inpatient Physical Therapy Daily Treatment Note    Unit: [facility]  Date:  3/7/2021  Patient Name:    Corwin Jessica  Admitting diagnosis:  Closed comminuted intertrochanteric fracture of left femur with nonunion [S72.142K]  Admit Date:  3/4/2021  Precautions/Restrictions: Fall risk, Bed/chair alarm, Lines -IV,  and Cordero catheter, [place] (hard of hearing) and WB Restrictions (FWB as tolerated)       Discharge Recommendations: ARU/IRF (inpatient rehab facility)   DME needs for discharge: defer to facility       Therapy recommendation for EMS Transport: requires transport by cot due to inability to transfer without lift equiptment    Therapy recommendations for staff:   Assist of 2 with use of PRANAV STEDY and gait belt for all transfers to/from [facility]  to/from [facility]    History Of Present Illness:       The patient is a 73 y. o. male with a PMH of CAD s/p remote stent, COPD, Type II DM, HLD, HTN who presented to [facility] with complaint of mechanical fall while trying to get out of his truck. Reports his foot landed in some mud and he got stuck and fell on his left side. He was unable to ambulate following the fall. Reported severe left hip pain. Denied any chest pain, dizziness, SOB or lightheadedness prior to the fall. Did not hit his head. No loss of consciousness. Denies numbness or tingling to BLEs.    Hx does have a hx of CAD s/p stent. He does take ASA and Plavix. No hx of VTE that pt is aware of. He is a current smoker. Denies active chest pain or SOB. Denies any knowledge of issues with prior anesthesia.   PMH: arthritis, CAD, COPD, DM, HTN, s/p angioplasty with stent, B fem BPG, Upper GI EGD control hemorrhage     X-ray L hip/pelvis 3/4/21:  Nondisplaced intertrochanteric fx proximal L femur  Patient underwent IM nailing 3/5/21    Home Health S4 Level Recommendation: NA  AM-PAC Mobility Score   AM-PAC Inpatient Mobility Raw Score : 10       Treatment Time:  8:28-9:28  Treatment number: 2  Timed Code Treatment Minutes: 60 cooperation and motivation to participate with therapy this date. Progress was limited by pain this date. Continue to recommend skilled PT while in acute care. Recommending ARU/IRF/Inpatient Rehab Facility upon discharge as patient functioning well below baseline, demonstrates good rehab potential and unable to return home due to limited or no family support, inability to negotiate stairs to enter home/bedroom/bathroom, burden of care beyond caregiver ability and home environment not conducive to patient recovery.       Goals : To be met in 3 visits:  1). Transfer bed to chair with Mod A and RW     To be met in 6 visits:  1). Supine to/from sit: Min A   2). Sit to/from stand: Min A   3). Bed to chair: Min A   4). Gait: Ambulate 50 ft.   with  Min A  and use of rolling walker (RW)  5). Tolerate B LE exercises 3 sets of 10-15 reps  6). Ascend/descend 1 steps with Min A  with use of no handrail     Plan   Continue with plan of care. Signature: Galen Wu, PT #[id number]    If patient discharges from this facility prior to next visit, this note will serve as the Discharge Summary.

## 2021-03-07 NOTE — PROGRESS NOTES
Pt a/o. Am assessment completed see flow sheet. PRN oxycodone given for l hip pain 8/10. No other needs voiced at this time. Pt up in chair. Call light within reach. Will continue to monitor.

## 2021-03-07 NOTE — PROGRESS NOTES
for infection  - repeat CBC was normal     Type II DM  - controlled  - cont Lantus 20 u nightly, add low dose SSI; hold Janumet  - POC Glucose, monitor     HTN  - remains uncontrolled, likely 2/2 pain  - cont Norvasc, Coreg Lasix, PRN pain meds for pain control  - add PRN hydralazine        COPD  - no AE  - cont [address] Du[place], [place]     GERD  - cont Protonix     Recent Admission to [facility]   - on 2/19 for hematemesis  - underwent EGD which was unremarkable for active bleeding, unable to r/o small Tracey-Clara tear    DVT Prophylaxis: Lovenox  Diet: DIET CARB CONTROL;  Code Status: Full Code    Dionna Ozuna MD  3/7/2021

## 2021-03-07 NOTE — PROGRESS NOTES
Pt requesting to move back to bed. Stedy x2 assist utilized to transfer pt. Pt tolerated very well. Sitting up in bed, pt encouraged to eat lunch. Pt did give verbal consent for his daughter Amy Miles to come up to his room. Removed condom catheter, encouraged pt to call if he needed assistance using urinal. Pt resting comfortably in bed at this time.

## 2021-03-08 NOTE — DISCHARGE SUMMARY
Name:  Reena Penaloza  Room:  0214/0214-01  MRN:    [id number]    Discharge Summary      This discharge summary is in conjunction with a complete physical exam done on the day of discharge. Discharging Physician: Dr. Varghese Grace: 3/4/2021  Discharge:    3/9/2021     HPI taken from admission H&P:    The patient is a 66 y.o. male with a PMH of CAD s/p remote stent, COPD, Type II DM, HLD, HTN who presented to [facility] ED with complaint of mechanical fall while trying to get out of his truck. Reports his foot landed in some mud and he got stuck and fell on his left side. He was unable to ambulate following the fall. Reported severe left hip pain. Denied any chest pain, dizziness, SOB or lightheadedness prior to the fall. Did not hit his head. No loss of consciousness. Denies numbness or tingling to BLEs.    Hx does have a hx of CAD s/p stent. He does take ASA and Plavix. No hx of VTE that pt is aware of. He is a current smoker. Denies active chest pain or SOB. Denies any knowledge of issues with prior anesthesia. Diagnoses this Admission and Hospital Course     Left Femur Fracture  Mechanical Fall  - ortho consulted.   S/P left hip IM nailing on 3/5  - SQ Lovenox for DVT PPX, PRN pain meds, held ASA & Plavix before surgery- now resumed    CAD- S/P remote stent  Ischemic CMP  EKG changes   - EKG reviewed - T wave inversion in lateral leads, new compared to prior  - trop 0.01  - cards consulted for pre-op clearance- EKG changes likely due to hypokalemia- treated   - cont Coreg, Lisinopril, Lipitor; resumed ASA and Plavix.     Chronic Combined CHF  - appears compensated  - last echo 9/2017 - EF 45-50%, grade I DD  - cont Coreg, Lisinopril and Lasix     Hypokalemia  - improved, cont replacement at discharge for one week and check BMP     Hypomagnesemia  - replaced and resolved      Leukocytosis - Resolved  - 14  - favor reactive, no concerns for infection  - repeat CBC was normal     Type II DM  - controlled- resume home regimen on discharge    HTN  -uncontrolled, likely 2/2 pain  - cont Norvasc, Coreg Lasix, PRN pain meds for pain control  - added PRN hydralazine     COPD  - no AE  - cont [address] Duonebs, Dulera     GERD  - cont Protonix     Recent Admission to [facility]   - on 2/19 for hematemesis  - underwent EGD which was unremarkable for active bleeding, unable to r/o small Tracey-Claar tear    Procedures (Please Review Full Report for Details)  LEFT FEMUR INTRAMEDULLARY NAIL GRAZYNA INSERTION    Consults    Orthopedic surgery   Cardio      Physical Exam at Discharge:    /69   Pulse 64   Temp 96.9 °F (36.1 °C) (Oral)   Resp 16   Ht 6' (1.829 m)   Wt 167 lb (75.8 kg)   SpO2 94%   BMI 22.65 kg/m²     Gen: No distress. Alert. Elderly  male. [place]  Eyes: No sclera icterus. No conjunctival injection. Neck: Trachea midline. Resp: No accessory muscle use. No crackles. No wheezes. No rhonchi. On RA   CV: Regular rate. Regular rhythm. No murmur.  No rub. No edema. GI: Soft, Non-tender. Non-distended. No masses. No organomegaly. Normal bowel sounds. No hernia. Skin: Warm and dry. No rash on exposed extremities. M/S: Left hip TTP. No edema  Neuro: Awake. Grossly nonfocal, follows commands, moves all extremities - as able (limited 2/2 left hip pain), sensation intact to bilateral lower extremities    Psych: No anxiety or agitation. CBC:   Recent Labs     03/08/21  0532 03/09/21  0535   WBC 6.6 6.3   HGB 11.9* 12.0*   HCT 34.2* 35.2*   MCV 86.5 87.6    169     BMP:   Recent Labs     03/08/21  0532      K 3.0*      CO2 32   BUN 20   CREATININE 1.1     LIVER PROFILE:   No results for input(s): AST, ALT, LIPASE, BILIDIR, BILITOT, ALKPHOS in the last 72 hours. Invalid input(s): AMYLASE,  ALB    CULTURES  COVID NAAT: not detected  C diff: negative     RADIOLOGY  XR HIP LEFT (2-3 VIEWS)   Final Result   Status post ORIF of intertrochanteric fracture of left proximal femur.       RECOMMENDATION: Intraprocedural fluoroscopic spot images as above. See separate procedure   report for more information. FLUORO FOR SURGICAL PROCEDURES   Final Result   Intraprocedural fluoroscopic spot images as above. See separate procedure   report for more information. XR HIP 1 VW W PELVIS LEFT   Final Result   Nondisplaced intertrochanteric fracture of the proximal left femur. Discharge Medications     Medication List      START taking these medications    enoxaparin 40 MG/0.4ML injection  Commonly known as: LOVENOX  Inject 0.4 mLs into the skin daily  Notes to patient: Enoxaparin (Lovenox®)  Use: prevent blood clots, treat blood clots, to prevent a stroke. Side effects: bruising and irritation around the injection site. oxyCODONE 5 MG immediate release tablet  Commonly known as: ROXICODONE  Take 1 tablet by mouth every 6 hours as needed for Pain for up to 7 days. Notes to patient: Can cause constipation     potassium chloride 20 MEQ extended release tablet  Commonly known as: KLOR-CON M  Take 1 tablet by mouth daily for 7 days        CONTINUE taking these medications    acetaminophen 325 MG tablet  Commonly known as: TYLENOL  Take 2 tablets by mouth every 4 hours as needed for Pain or Fever Maximum dose of acetaminophen is 4000 mg from all sources in 24 hours. * albuterol sulfate  (90 Base) MCG/ACT inhaler  Commonly known as: Proventil HFA  Inhale 2 puffs into the lungs every 4 hours as needed for Wheezing or Shortness of Breath With spacer (and mask if indicated). Thanks.      * albuterol sulfate  (90 Base) MCG/ACT inhaler  Commonly known as: ProAir HFA  Inhale 2 puffs into the lungs every 6 hours as needed for Wheezing     amLODIPine 10 MG tablet  Commonly known as: NORVASC  Take 0.5 tablets by mouth nightly Note: new lower dose     aspirin 81 MG chewable tablet     atorvastatin 80 MG tablet  Commonly known as: LIPITOR  Take 1 tablet by mouth daily     carvedilol 6.25 MG tablet  Commonly known as: COREG     clopidogrel 75 MG tablet  Commonly known as: PLAVIX     furosemide 40 MG tablet  Commonly known as: LASIX  Take 1 tablet by mouth daily     insulin glargine 100 UNIT/ML injection pen  Commonly known as: Lantus SoloStar  Inject 20 Units into the skin nightly     insulin lispro 100 UNIT/ML pen  Commonly known as: HumaLOG KwikPen  Inject 0-6 Units into the skin 3 times daily (before meals) Low Dose Correction Algorithm: BS  No Insulin, -199 1 Unit, 200-249 2 Units, 250-299 3 Units, 300-349 4 Units, 350-399 5 Units, 400 and above give 6 Units     ipratropium-albuterol 0.5-2.5 (3) MG/3ML Soln nebulizer solution  Commonly known as: DUONEB  Inhale 3 mLs into the lungs 3 times daily     Janumet -1000 MG Tb24  Generic drug: SITagliptin-metFORMIN HCl ER     lisinopril 40 MG tablet  Commonly known as: PRINIVIL;ZESTRIL     mometasone-formoterol 200-5 MCG/ACT inhaler  Commonly known as: DULERA  Inhale 2 puffs into the lungs 2 times daily Substituted for Budesonide-Formoterol (SYMBICORT). pantoprazole sodium 40 MG Pack packet  Commonly known as: PROTONIX     trospium 60 MG Cp24 extended release capsule  Commonly known as: SANCTURA  Take 1 capsule by mouth daily         * This list has 2 medication(s) that are the same as other medications prescribed for you. Read the directions carefully, and ask your doctor or other care provider to review them with you. Where to Get Your Medications      You can get these medications from any pharmacy    Bring a paper prescription for each of these medications  · enoxaparin 40 MG/0.4ML injection  · oxyCODONE 5 MG immediate release tablet     Information about where to get these medications is not yet available    Ask your nurse or doctor about these medications  · potassium chloride 20 MEQ extended release tablet           Discharged in stable condition to [facility]     Follow Up:   Follow up with physician at [facility], ortho surgery as recommended         More than 30 mts spent      [address] 3/9/2021 3:05 PM

## 2021-03-08 NOTE — CARE COORDINATION
INTERDISCIPLINARY PLAN OF CARE CONFERENCE    Date/Time: 3/8/2021 9:37 AM  Completed by: Evert Gomez, Case Management      Patient Name:  Mir Ann  YOB: 1945  Admitting Diagnosis: Closed comminuted intertrochanteric fracture of left femur with nonunion [S72.142K]     Admit Date/Time:  3/4/2021 11:36 AM    Chart reviewed. Interdisciplinary team contacted or reviewed plan related to patient progress and discharge plans. Disciplines included Case Management, Nursing, and Dietitian. Current Status: IP 03/04/2021  PT/OT recommendation for discharge plan of care: Rehab    Expected D/C Disposition:  Skilled nursing facility  Confirmed plan with patient and sonHugo (via phone), daughter Sharen Carrel at bedside. Discharge Plan Comments: SNF recommended. Per sonHugo pt 's/families 1st choice for SNF is [facility]. +in network with [employer] Will require a pre-cert / WEbook. Referral called to Yolanda @ [facility]  Faxed face sheet, H/P, MAR and therapy notes for review: Yes- [facility]  Bed available?: Yes- referral under review. Insurance precertification required? Yes- [facility] to submit once admission is confirmed  Continuity of Care Form ([address]) initiated? Yes   Hospital Exemption Notification (HENS) initiated? Yes    Initiate Level of Care (LOC), if Medicaid is the primary payer?  Not Indicated    Home O2 in place on admit: No  Pt informed of need to bring portable home O2 tank on day of discharge for nursing to connect prior to leaving:  Not Indicated  Verbalized agreement/Understanding:  Not Indicated

## 2021-03-08 NOTE — PROGRESS NOTES
Patient is alert and oriented X 4. Refused dulcolax and glycolax, stating he uses the restroom too much now. Clean dry bandage noted to the proximal upper right hip area. Cordero in place and draining to gravity. Took AM meds without issue.

## 2021-03-08 NOTE — PROGRESS NOTES
Progress Note    Admit Date:  3/4/2021    Admitted with left hip fracture s/p left hip IM nailing. Subjective:  Mr. Ana Rosas is C diff negative. Awaiting pre cert. K is low. Objective:   BP (!) 151/62   Pulse 59   Temp 97.4 °F (36.3 °C) (Oral)   Resp 16   Ht 6' (1.829 m)   Wt 167 lb (75.8 kg)   SpO2 97%   BMI 22.65 kg/m²            Intake/Output Summary (Last 24 hours) at 3/8/2021 1216  Last data filed at 3/8/2021 0902  Gross per 24 hour   Intake 544 ml   Output 350 ml   Net 194 ml       Physical Exam:  Gen: No distress. Alert. Elderly  male. [place]  Eyes: No sclera icterus. No conjunctival injection. Neck: Trachea midline. Resp: No accessory muscle use. No crackles. No wheezes. No rhonchi. On RA   CV: Regular rate. Regular rhythm. No murmur. No rub. No edema. GI: Soft, Non-tender. Non-distended. No masses. No organomegaly. Normal bowel sounds. No hernia. Skin: Warm and dry. No rash on exposed extremities. M/S: Left hip TTP. No edema  Neuro: Awake. Grossly nonfocal, follows commands, moves all extremities - as able (limited 2/2 left hip pain), sensation intact to bilateral lower extremities    Psych: No anxiety or agitation.      Scheduled Meds:   magnesium oxide  400 mg Oral Daily    furosemide  20 mg Oral Daily    aspirin  81 mg Oral Daily    clopidogrel  75 mg Oral Daily    budesonide-formoterol  2 puff Inhalation BID    sodium chloride flush  10 mL Intravenous 2 times per day    acetaminophen  650 mg Oral Q6H    sennosides-docusate sodium  1 tablet Oral BID    polyethylene glycol  17 g Oral Daily    bisacodyl  5 mg Oral Daily    enoxaparin  40 mg Subcutaneous Daily    amLODIPine  5 mg Oral Nightly    atorvastatin  80 mg Oral Daily    carvedilol  6.25 mg Oral BID    insulin glargine  20 Units Subcutaneous Nightly    ipratropium-albuterol  3 mL Inhalation TID    lisinopril  40 mg Oral Daily    pantoprazole  40 mg Oral QAM AC    trospium  20 mg Oral Nightly    insulin lispro  0-6 Units Subcutaneous TID     insulin lispro  0-3 Units Subcutaneous Nightly       Continuous Infusions:      PRN Meds:  sodium chloride flush, promethazine **OR** ondansetron, magnesium hydroxide, oxyCODONE **OR** oxyCODONE, morphine **OR** morphine      Data:  CBC:   Recent Labs     03/06/21  0609 03/08/21  0532   WBC  --  6.6   HGB 11.3* 11.9*   HCT 33.6* 34.2*   MCV  --  86.5   PLT  --  183     BMP:   Recent Labs     03/06/21  0609 03/08/21  0532    141   K 3.5 3.0*    104   CO2 27 32   BUN 23* 20   CREATININE 1.2 1.1     LIVER PROFILE:   Recent Labs     03/06/21  0609   AST 11*   ALT 7*   BILITOT 0.4   ALKPHOS 128     PT/INR:   No results for input(s): PROTIME, INR in the last 72 hours. CULTURES    RAPID COVID: pending     RADIOLOGY    XR HIP 1 VW W PELVIS LEFT 3/4/2021   Final Result   Nondisplaced intertrochanteric fracture of the proximal left femur. Assessment/Plan:    Left Femur Fracture  Mechanical Fall  - XR left hip/pelvis: non-displaced intertrochanteric fx of proximal left femur  - Admit to Med Surg  - SQ Lovenox, PRN pain meds, held ASA & Plavix before surgery. He is back on it.  - ortho consulted   S/P left hip IM nailing on 3/5. Today is post op day#3    CAD  - S/P remote stent  Ischemic CMP  EKG changes   - EKG reviewed - T wave inversion in lateral leads, new compared to prior-  - trop 0.01  - cards consulted for pre-op clearance  EKG changes likely due to hypokalemia .    cont to monitor in tele   - cont Coreg, Lisinopril, Lipitor; resumed ASA and Plavix.     Chronic Combined CHF  - appears compensated  - last echo 9/2017 - EF 45-50%, grade I DD  - cont Coreg, Lisinopril and Lasix    Hypokalemia better  - BMP in am    Hypomagnesemia  - 1.6  - give 2 g Mg  - repeat Mg tomorrow     Leukocytosis - Resolved  - 14  - favor reactive, no concerns for infection  - repeat CBC was normal     Type II DM  - controlled  - cont Lantus 20 u nightly, add low dose SSI; hold Janumet  - POC Glucose, monitor     HTN  - remains uncontrolled, likely 2/2 pain  - cont Norvasc, Coreg Lasix, PRN pain meds for pain control  - add PRN hydralazine        COPD  - no AE  - cont [address] Duonebs, Dulera     GERD  - cont Protonix     Recent Admission to [facility]   - on 2/19 for hematemesis  - underwent EGD which was unremarkable for active bleeding, unable to r/o small Tracey-Clara tear    DVT Prophylaxis: Lovenox  Diet: DIET CARB CONTROL;  Code Status: Full Code    Pre cert pending    Scott Kramer 3/8/2021 12:17 PM

## 2021-03-08 NOTE — PROGRESS NOTES
Department of Orthopedic Surgery  Physician Assistant   Progress Note    Subjective:       Systemic or Specific Complaints:  No new issues today, pt in better spirits. No new issues overnight noted. Objective:     Patient Vitals for the past 24 hrs:   BP Temp Temp src Pulse Resp SpO2   03/08/21 0755 (!) 151/62 97.4 °F (36.3 °C) Oral 59 16 97 %   03/08/21 0748      95 %   03/08/21 0355 (!) 149/69 97 °F (36.1 °C) Oral 54 18 93 %   03/07/21 2037 (!) 152/71 97 °F (36.1 °C) Oral 58 18 94 %   03/07/21 1750      95 %   03/07/21 1406      95 %       General: alert, appears stated age, cooperative and no distress   Wound: Wound clean and dry no evidence of infection. Motion: Painful range of Motion in affected extremity   DVT Exam: No evidence of DVT seen on physical exam.     Additional exam: NVI to left LE  Thigh soft but swollen  Moving toes and ankle without difficulty. Data Review  CBC:   Lab Results   Component Value Date    WBC 6.6 03/08/2021    RBC 3.95 03/08/2021    HGB 11.9 03/08/2021    HCT 34.2 03/08/2021     03/08/2021       Renal:   Lab Results   Component Value Date     03/08/2021    K 3.0 03/08/2021    K 3.5 03/06/2021     03/08/2021    CO2 32 03/08/2021    BUN 20 03/08/2021    CREATININE 1.1 03/08/2021    GLUCOSE 65 03/08/2021    CALCIUM 8.4 03/08/2021            Assessment:      left hip IM nailing, POD #3. Plan:      1:  Continue current plan of care per IM. Labs reviewed and stable. Cardiology following pre op. 2:  Continue Deep venous thrombosis prophylaxis- lovenox  3:  Continue Pain Control PRN  4:  PT and OT, WBAT. No hip ROM precautions  5:  D/c planning pending progress and therapy recs, pt agreeable to SNF today. Awaiting precert for [facility]. Okay for d/c to SNF from ortho standpoint. DCP following  6: Instructions placed. Follow up with Dr. Leydi Patel.     Mando Go PA-C

## 2021-03-09 NOTE — PLAN OF CARE
EKG reviewed. T wave inversions noted in lateral leads which is new compared to prior EKG. Will check a troponin & consult cardiology for pre-op clearance. Pt denied any chest pain or SOB.     Marilin Zuñiga PA-C 9:10 PM 3/4/2021
Problem: Falls - Risk of:  Goal: Will remain free from falls  Description: Will remain free from falls  Outcome: Ongoing     Problem: Skin Integrity:  Goal: Absence of new skin breakdown  Description: Absence of new skin breakdown  Outcome: Ongoing     Problem: PAIN  Goal: Patient's pain/discomfort is manageable  Outcome: Ongoing
Problem: Falls - Risk of:  Goal: Will remain free from falls  Description: Will remain free from falls  Outcome: Ongoing     Problem: Skin Integrity:  Goal: Will show no infection signs and symptoms  Description: Will show no infection signs and symptoms  Outcome: Ongoing     Problem: SAFETY  Goal: Free from accidental physical injury  Outcome: Ongoing     Problem: PAIN  Goal: Patient's pain/discomfort is manageable  Outcome: Ongoing
Problem: Falls - Risk of:  Goal: Will remain free from falls  Description: Will remain free from falls  Outcome: Ongoing  Goal: Absence of physical injury  Description: Absence of physical injury  Outcome: Ongoing     Problem: Skin Integrity:  Goal: Will show no infection signs and symptoms  Description: Will show no infection signs and symptoms  Outcome: Ongoing
Problem: Falls - Risk of:  Goal: Will remain free from falls  Description: Will remain free from falls  Outcome: Ongoing  Goal: Absence of physical injury  Description: Absence of physical injury  Outcome: Ongoing     Problem: Skin Integrity:  Goal: Will show no infection signs and symptoms  Description: Will show no infection signs and symptoms  Outcome: Ongoing  Goal: Absence of new skin breakdown  Description: Absence of new skin breakdown  Outcome: Ongoing     Problem: SAFETY  Goal: Free from accidental physical injury  Outcome: Ongoing  Goal: Free from intentional harm  Outcome: Ongoing     Problem: DAILY CARE  Goal: Daily care needs are met  Outcome: Ongoing     Problem: PAIN  Goal: Patient's pain/discomfort is manageable  Outcome: Ongoing     Problem: SKIN INTEGRITY  Goal: Skin integrity is maintained or improved  Outcome: Ongoing     Problem: KNOWLEDGE DEFICIT  Goal: Patient/S.O. demonstrates understanding of disease process, treatment plan, medications, and discharge instructions.   Outcome: Ongoing     Problem: DISCHARGE BARRIERS  Goal: Patient's continuum of care needs are met  Outcome: Ongoing     Problem: Pain:  Goal: Pain level will decrease  Description: Pain level will decrease  Outcome: Ongoing  Goal: Control of acute pain  Description: Control of acute pain  Outcome: Ongoing  Goal: Control of chronic pain  Description: Control of chronic pain  Outcome: Ongoing
Problem: Falls - Risk of:  Goal: Will remain free from falls  Description: Will remain free from falls  Outcome: Ongoing  Goal: Absence of physical injury  Description: Absence of physical injury  Outcome: Ongoing     Problem: Skin Integrity:  Goal: Will show no infection signs and symptoms  Description: Will show no infection signs and symptoms  Outcome: Ongoing  Goal: Absence of new skin breakdown  Description: Absence of new skin breakdown  Outcome: Ongoing     Problem: SAFETY  Goal: Free from accidental physical injury  Outcome: Ongoing  Goal: Free from intentional harm  Outcome: Ongoing     Problem: DAILY CARE  Goal: Daily care needs are met  Outcome: Ongoing     Problem: PAIN  Goal: Patient's pain/discomfort is manageable  Outcome: Ongoing     Problem: SKIN INTEGRITY  Goal: Skin integrity is maintained or improved  Outcome: Ongoing     Problem: KNOWLEDGE DEFICIT  Goal: Patient/S.O. demonstrates understanding of disease process, treatment plan, medications, and discharge instructions.   Outcome: Ongoing     Problem: DISCHARGE BARRIERS  Goal: Patient's continuum of care needs are met  Outcome: Ongoing     Problem: Pain:  Goal: Pain level will decrease  Description: Pain level will decrease  Outcome: Ongoing  Goal: Control of acute pain  Description: Control of acute pain  Outcome: Ongoing  Goal: Control of chronic pain  Description: Control of chronic pain  Outcome: Ongoing
Description: Control of chronic pain  Outcome: Ongoing

## 2021-03-09 NOTE — FLOWSHEET NOTE
03/08/21 2011   Vital Signs   Temp 97.4 °F (36.3 °C)   Temp Source Oral   Pulse 62   Heart Rate Source Monitor   Resp 16   /74   BP Location Right upper arm   Patient Position Semi fowlers   Level of Consciousness Alert (0)   MEWS Score 1   Pain Assessment   Pain Assessment 0-10   Pain Level 0   Patient's Stated Pain Goal No pain   Pain Type Acute pain;Surgical pain   Pain Location Leg  (femur fracutre)   Pain Orientation Left   Pain Descriptors Aching   Functional Pain Assessment Prevents or interferes some active activities and ADLs   Non-Pharmaceutical Pain Intervention(s) Other (Comment)  (denies needs for pain )   Oxygen Therapy   SpO2 94 %   O2 Device None (Room air)   Pm assessment completed at 2011. Patient quiet in bed. Was initially verbally aggressive and yelling. Easily redirectable. Vitals stable per above. Patient reported no pain. Night meds given per MAR. Call light in reach.

## 2021-03-09 NOTE — PROGRESS NOTES
Assessment complete. Meds passed. VSS. PT denies pain except upon movement on the LLE. PT took stool softeners. HOB up.  Call light in reach, will continue to monitor

## 2021-03-19 NOTE — CARE COORDINATION
Readmission Assessment  Number of Days since last admission?: 8-30 days  Previous Disposition: Home with Family  Who is being Interviewed: (chart review prior admit was at [facility] from 2-19 to 2-20 dc home)  What was the patient's/caregiver's perception as to why they think they needed to return back to the hospital?: Other (Comment)(on present admit - a fall with fx hip)  Did you visit your Primary Care Physician after you left the hospital, before you returned this time?: No  Did you see a specialist, such as Cardiac, Pulmonary, Orthopedic Physician, etc. after you left the hospital?: No  Who advised the patient to return to the hospital?: Self-referral, Caregiver  Does the patient report anything that got in the way of taking their medications?: No

## 2021-03-25 NOTE — PROGRESS NOTES
healing, subsequent encounter        Office Procedures:  Orders Placed This Encounter   Procedures    XR HIP LEFT (2-3 VIEWS)     Standing Status:   Future     Number of Occurrences:   1     Standing Expiration Date:   3/24/2022    Walker rolling       Treatment Plan:    Overall Raj Be is doing well. The pain is well-controlled. We recommend that He commence with physical therapy for timeline based progression of motion, weight bearing, and and strengthening. They were advised to continue their home anti-coagulation medicines. Home health PT to continue. F/up in 2 months for repeat xray and exam.    All of his questions were fully answered today. We would like to see Raj Be back in  6-8 weeks for follow-up visit and x-rays. Sincerely,    Kvng Beal MD [facility]   [address], [address]  Email: [email or web address]  Office: [phone]    03/25/21  12:36 PM

## 2021-03-29 NOTE — TELEPHONE ENCOUNTER
S/w Devin Velasquez  Date of surgery given. Wound care instructions given. Patient has been at [facility]. Dc home with home health care with [address]  pcp will follow the home health care and sign orders.

## 2021-03-29 NOTE — TELEPHONE ENCOUNTER
Sharifa [address] [place] [address] IS CALLING REGARDING SOME QUESTIONS ON THIS PATIENT'S SURGERY AND IIF HOME HEALTH IS NEEDED. PLEASE CALL Lb Rodriguez [phone].

## 2021-05-20 NOTE — TELEPHONE ENCOUNTER
Received a referral from [facility] for new patient Lung Nodule. Please advise when to schedule. Narrative   EXAMINATION:   CT OF THE CHEST WITHOUT CONTRAST 2/19/2021 6:21 pm       TECHNIQUE:   CT of the chest was performed without the administration of intravenous   contrast. Multiplanar reformatted images are provided for review. Dose   modulation, iterative reconstruction, and/or weight based adjustment of the   mA/kV was utilized to reduce the radiation dose to as low as reasonably   achievable.       COMPARISON:   None.       HISTORY:   ORDERING SYSTEM PROVIDED HISTORY: Pulmonary nodule   TECHNOLOGIST PROVIDED HISTORY:   Reason for exam:->Pulmonary nodule   Reason for Exam: pulmonary nodule; sob   Acuity: Acute   Type of Exam: Initial   Relevant Medical/Surgical History: diabetic, copd, cad       FINDINGS:   Lungs/pleura:  The central airways are patent.       Is a 7 cm right upper lobe for spine abnormalities seen on previous chest   x-ray.       No additional pulmonary nodules masses are identified.       There are calcified pleural plaques at the left base consistent prior   asbestos exposure.       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

## 2021-05-21 NOTE — TELEPHONE ENCOUNTER
Spoke with pt son whom states that pt is currently showering and getting ready for another appt this afternoon at 2pm.  Pt son requested Monday. Please advise.

## 2021-05-21 NOTE — TELEPHONE ENCOUNTER
I will see if Dr. Juliana Lloyd can see this patient as they may benefit from [address] biopsy.

## 2021-05-21 NOTE — TELEPHONE ENCOUNTER
Patient scheduled for IN OFFICE appt on 5/24/21    [facility] PET scheduled out 6/1/21. Trying to get sooner working with [facility] to schedule submitted PA awaiting determination. Called [facility] to schedule they do have availability next week but can not schedule until office receives Jack Simon. Ct;OV from pcp faxed.  Watch for determination

## 2021-05-24 NOTE — PROGRESS NOTES
[facility]                                                          TELEHEALTH EVALUATION: Service performed was Audio/Visual (During LZM-72 public health emergency) and not a face-to-face visit           CHIEF COMPLAINT: Lung nodule    Consulting provider: Dev Lewis      HPI:   66years old with history of COPD had a chest x-ray 2/19/2021 that showed nodule. CT scan was done 2/19/2021 to further evaluate abnormal chest x-ray. CT showed small RUL spiculated nodule. Not sure what makes better or worse. No associated hemoptysis. Smoker for 61 years, on average 1 pack/day. Has shortness of breath on exertion and cough with brown sputum. Uses albuterol once or twice a day. Old records were reviewed and summarized by me. Past Medical History:   Diagnosis Date    Arthritis     CAD (coronary artery disease)     [facility] [facility]     Diabetes mellitus [facility] [address] [facility]     Hyperlipidemia     Hypertension        Past Surgical History:        Procedure Laterality Date    CORONARY ANGIOPLASTY WITH STENT PLACEMENT      FEMORAL BYPASS Bilateral     FEMUR FRACTURE SURGERY Left 3/5/2021    LEFT FEMUR INTRAMEDULLARY NAIL GRAZYNA INSERTION performed by Fernandez Claudio MD at [address] [place] [address]      LEFT FEMUR INTRAMEDULLARY NAIL GRAZYNA INSERTION (Left Hip    UPPER GASTROINTESTINAL ENDOSCOPY N/A 2/19/2021    EGD CONTROL HEMORRHAGE performed by Thania Koehler MD at [facility]:  is allergic to no known allergies. Social History:    TOBACCO:   reports that he has been smoking cigarettes. He has a 91.50 pack-year smoking history. He has never used smokeless tobacco.  ETOH:   reports no history of alcohol use. Family History:   No lung cancer       Current Medications:    Current Outpatient Medications:     Misc.  Devices (ROLLING WALKER/BURGUNDY) MISC, 1 Device by Does not mouth daily, Disp: , Rfl:     pantoprazole sodium (PROTONIX) 40 MG PACK packet, Take 40 mg by mouth every morning (before breakfast), Disp: , Rfl:     SITagliptin-MetFORMIN HCl ER (JANUMET XR) 100-1000 MG TB24, Take 1 tablet by mouth daily, Disp: , Rfl:     clopidogrel (PLAVIX) 75 MG tablet, Take 75 mg by mouth daily, Disp: , Rfl:     potassium chloride (KLOR-CON M) 20 MEQ extended release tablet, Take 1 tablet by mouth daily for 7 days, Disp: 7 tablet, Rfl: 0    albuterol sulfate HFA (PROVENTIL HFA) 108 (90 BASE) MCG/ACT inhaler, Inhale 2 puffs into the lungs every 4 hours as needed for Wheezing or Shortness of Breath With spacer (and mask if indicated). Thanks. , Disp: 1 Inhaler, Rfl: 1      REVIEW OF SYSTEMS:  Constitutional: Negative for fever  HENT: Negative for sore throat  Eyes: Negative for redness   Respiratory:+  dyspnea, cough  Cardiovascular: Negative for chest pain  Gastrointestinal: + diarrhea   Genitourinary: Negative for hematuria   Musculoskeletal:+ arthralgias   Skin: Negative for rash  Neurological: Negative for syncope  Hematological: Negative for adenopathy  Psychiatric/Behavorial: Negative for anxiety      Objective:   PHYSICAL EXAM:    There were no vitals taken for this visit.' on RA  O2 Sat:  Mallampati class IV. Temperature:  Constitutional:  No acute distress. Appears well developed and nourished. Eyes: No sclera icterus. EOM intact. No visible discharge. HENT: Head is normocephalic and atraumatic. Mucus membranes are moist and the tongue appears normal. Normal appearing nose. External Ears normal.   Neck: No visualized mass. Joaquín Pique is midline   Resp: No accessory muscle use. Respiratory effort normal. No visualized signs of difficulty breathing or respiratory distress. Cardiovascular: No LE edema. Musculoskeletal: Normal gait with no signs of ataxia. Normal range of motion of the neck.   Skin: No significant exanthematous lesions or discoloration noted on facial skin Neuro: Awake. Alert. Able to follow commands. No facial asymmetry. No gaze palsy. Psych: No agitation. Normal affect. No hallucinations. Oriented to person/time/place. No anxiety. Normal judgement and insight. DATA reviewed by me:   CT chest 2/19/2021 imaging reviewed by me and showed  RUL spiculated nodule    Assessment:       · RUL nodule on CT chest 2/19/21 concerning for bronchogenic carcinoma. · Pulmonary emphysema   · 61 pack year smoking       Plan:        PET scan evaluate for metabolic activities and metastatic disease.  PFTs and 6MW   Spiriva Respimat: Two inhalations (5 mcg) once daily (maximum: 2 inhalations per 24 hours)   Albuterol 2 puffs Q4-6 hrs PRN  Smoking cessation counseling  Discussed with son Carol Ware who accompanied patient during the visit. Both verbalized understanding that nodule is concerning for bronchogenic carcinoma and delaying work-up/treatment could lead to worse outcome including metastatic disease          Thejr Man is a 66 y.o. male being evaluated by a Virtual Visit (video visit) encounter to address concerns as mentioned above. A caregiver was present when appropriate. Due to this being a TeleHealth encounter (During [facility]- public health emergency), evaluation of the following organ systems was limited: Vitals/Constitutional/EENT/Resp/CV/GI//MS/Neuro/Skin/Heme-Lymph-Imm. Pursuant to the emergency declaration under the [facility], [address] [place] [address] authority and the [employer] and [employer] Act, this Virtual Visit was conducted with patient's (and/or legal guardian's) consent, to reduce the patient's risk of exposure to COVID-19 and provide necessary medical care. The patient (and/or legal guardian) has also been advised to contact this office for worsening conditions or problems, and seek emergency medical treatment and/or call 911 if deemed necessary.      Services were provided through a video synchronous discussion virtually to substitute for in-person clinic visit. Patient was located in her home, provider was located in his office. --Deangelo Mobley MD on 5/24/2021 at 2:36 PM    An electronic signature was used to authenticate this note.

## 2021-05-24 NOTE — TELEPHONE ENCOUNTER
Within this Telehealth Consent, the terms you and yours refer to the person using the Telehealth Service (Service), or in the case of a use of the Service by or on behalf of a minor, you and yours refer to and include (i) the parent or legal guardian who provides consent to the use of the Service by such minor or uses the Service on behalf of such minor, and (ii) the minor for whom consent is being provided or on whose behalf the Service is being utilized. When using Service, you will be consulting with your health care providers via the use of Telehealth.   Telehealth involves the delivery of healthcare services using electronic communications, information technology or other means between a healthcare provider and a patient who are not in the same physical location. Telehealth may be used for diagnosis, treatment, follow-up and/or patient education, and may include, but is not limited to, one or more of the following:    Electronic transmission of medical records, photo images, personal health information or other data between a patient and a healthcare provider    Interactions between a patient and healthcare provider via audio, video and/or data communications    Use of output data from medical devices, sound and video files    Anticipated Benefits   The use of Telehealth by your Provider(s) through the Service may have the following possible benefits:    Making it easier and more efficient for you to access medical care and treatment for the conditions treated by such Provider(s) utilizing the Service    Allowing you to obtain medical care and treatment by Provider(s) at times that are convenient for you    Enabling you to interact with Provider(s) without the necessity of an in-office appointment     Possible Risks   While the use of Telehealth can provide potential benefits for you, there are also potential risks associated with the use of Telehealth.  These risks include, but may not be limited to the following:    Your Provider(s) may not able to provide medical treatment for your particular condition and you may be required to seek alternative healthcare or emergency care services.  The electronic systems or other security protocols or safeguards used in the Service could fail, causing a breach of privacy of your medical or other information.  Given regulatory requirements in certain jurisdictions, your Provider(s) diagnosis and/or treatment options, especially pertaining to certain prescriptions, may be limited. Acceptance   1. You understand that Services will be provided via Telehealth. This process involves the use of HIPAA compliant and secure, real-time audio-visual interfacing with a qualified and appropriately trained provider located at [facility]. 2. You understand that, under no circumstances, will this session be recorded. 3. You understand that the Provider(s) at [facility] and other clinical participants will be party to the information obtained during the Telehealth session in accordance with best medical practices. 4. You understand that the information obtained during the Telehealth session will be used to help determine the most appropriate treatment options. 5. You understand that You have the right to revoke this consent at any point in time. 6. You understand that Telehealth is voluntary, and that continued treatment is not dependent upon consent. 7. You understand that, in the event of non-consent to Telehealth services and/or technical difficulties, you will obtain services as typically provided in the absence of Telehealth technology. 8. You understand that this consent will be kept in Your medical record. 9. No potential benefits from the use of Telehealth or specific results can be guaranteed. Your condition may not be cured or improved and, in some cases, may get worse.    10. There are limitations in the provision of medical care and treatment via Telehealth and the Service and you may not be able to receive diagnosis and/or treatment through the Service for every condition for which you seek diagnosis and/or treatment. 11. There are potential risks to the use of Telehealth, including but not limited to the risks described in this Telehealth Consent. 12. Your Provider(s) have discussed the use of Telehealth and the Service with you, including the benefits and risks of such and you have provided oral consent to your Provider(s) for the use of Telehealth and the Service. 15. You understand that it is your duty to provide your Provider(s) truthful, accurate and complete information, including all relevant information regarding care that you may have received or may be receiving from other healthcare providers outside of the Service. 14. You understand that each of your Provider(s) may determine in his or sole discretion that your condition is not suitable for diagnosis and/or treatment using the Service, and that you may need to seek medical care and treatment a specialist or other healthcare provider, outside of the Service. 15. You understand that you are fully responsible for payment for all services provided by Provider(s) or through use of the Service and that you may not be able to use third-party insurance. 16. You represent that (a) you have read this Telehealth Consent carefully, (b) you understand the risks and benefits of the Service and the use of Telehealth in the medical care and treatment provided to you by Provider(s) using the Service, and (c) you have the legal capacity and authority to provide this consent for yourself and/or the minor for which you are consenting under applicable federal and state laws, including laws relating to the age of [de-identified] and/or parental/guardian consent.    17. You give your informed consent to the use of Telehealth by Provider(s) using the Service under the terms described in the Terms of Service and this Telehealth Consent. The patient was read the following statement and has consented to the visit as of 5/24/21. The patient has been scheduled for their first telehealth visit on 5/24/21 with .

## 2021-05-24 NOTE — TELEPHONE ENCOUNTER
Received PET approval approval number [id number]. Approval scanned into media. Please sign PET order. [facility] scheduled patient for 6/1/21 @ 1:15pm pt will need to be notified on this. He is scheduled for an IN OFFICE appt today 5/24/21. No lantus 12 hours prior, no humalog or other DM meds 5 hours prior. Pt to only have water 5 hours prior. Blood sugar needs to stay under 200.

## 2021-05-24 NOTE — TELEPHONE ENCOUNTER
Patient called with message left for patient to call back to office regarding f/u appt. [facility] scheduled patient for 6/1/21 @ 1:15pm pt will need to be notified on this. He is scheduled for an IN OFFICE appt today 5/24/21. No lantus 12 hours prior, no humalog or other DM meds 5 hours prior. Pt to only have water 5 hours prior. Blood sugar needs to stay under 200. PFT scheduled 6/16/21, and f/u with Dr Jose Armando Nelson 6/7/21.

## 2021-06-03 NOTE — TELEPHONE ENCOUNTER
Spoke with Lei Turpin from [employer] whom states patient no showed to appt on 6/1/21. Left message asking Elan/Patient to return call to office.

## 2021-06-07 NOTE — TELEPHONE ENCOUNTER
Patient did not show for PN,emphysema,PET follow-up appointment  with  on 6/7/21    Same Day Cancellation: No    Patient rescheduled:  No    New appointment: n/a    Patient was also no show on: n/a    LOV   Assessment: 5/24/21      · RUL nodule on CT chest 2/19/21 concerning for bronchogenic carcinoma. · Pulmonary emphysema   · 61 pack year smoking       Plan:       · PET scan evaluate for metabolic activities and metastatic disease. · PFTs and 6MW  · Spiriva Respimat: Two inhalations (5 mcg) once daily (maximum: 2 inhalations per 24 hours)  · Albuterol 2 puffs Q4-6 hrs PRN  · Smoking cessation counseling  · Discussed with son Lizeth Garcia who accompanied patient during the visit. Both verbalized understanding that nodule is concerning for bronchogenic carcinoma and delaying work-up/treatment could lead to worse outcome including metastatic disease              Syeda Ochoa is a 66 y.o. male being evaluated by a Virtual Visit (video visit) encounter to address concerns as mentioned above.  A caregiver was present when appropriate. Due to this being a TeleHealth encounter (During ACPSY-01 public health emergency), evaluation of the following organ systems was limited: Vitals/Constitutional/EENT/Resp/CV/GI//MS/Neuro/Skin/Heme-Lymph-Imm.  Pursuant to the emergency declaration under the [facility], [address] waiver authority and the "[employer] and [employer] Act, this Virtual Visit was conducted with patient's (and/or legal guardian's) consent, to reduce the patient's risk of exposure to COVID-19 and provide necessary medical care.  The patient (and/or legal guardian) has also been advised to contact this office for worsening conditions or problems, and seek emergency medical treatment and/or call 911 if deemed necessary.     Services were provided through a video synchronous discussion virtually to substitute for in-person clinic visit. Patient was located in her home, provider was located in his office.     --Deangelo Mobley MD on 5/24/2021 at 2:36 PM     An electronic signature was used to authenticate this note.

## 2021-06-07 NOTE — TELEPHONE ENCOUNTER
Patient son called wanting to cindy PET scan informed unable to brad at [address] [facility]. [facility] for 6/22/21 @ 4:15pm arrival at [facility]. Son informed of date time and location. Precert will need updated to [facility]. LEDY#[phone]. Called [employer] update location spoke with Kadeem Quan.

## 2021-06-14 NOTE — TELEPHONE ENCOUNTER
Ok to scheduled f/u appointment for PET scan results ICU week? Please advise. Pet scan scheduled 6/22/21.

## 2021-06-17 NOTE — PROCEDURES
[address]                 [address] [address]                               PULMONARY FUNCTION    PATIENT NAME: Lakshmi Gaitan                      :        1945  MED REC NO:   [id number]                          ROOM:  ACCOUNT NO:   [de-identified]                           ADMIT DATE: 2021  PROVIDER:     Crystal Boo MD    DATE OF PROCEDURE:  2021    INDICATION:  Pulmonary nodule. FINDINGS:  1. Spirometry revealed evidence of moderate obstructive defect. FEV1  is 1.3 liters, which is 50% of predicted. No significant response to  bronchodilators. FEV1/FVC ratio of 55%. 2.  Lung volume revealed normal total lung capacity of 7.6 liters, which  is 120% of predicted. Air trapping residual volume of 5.79 liters,  which is 215% of predicted. 3.  Diffusion capacity unable to obtain. The patient had difficulty  with testing despite multiple attempts. 4.  Flow volume loops suggestive of obstructive defect. 5.  Six-minute walk was done per [facility] protocol. The  patient was able to walk only 80 feet due to weak and unsteady gait. Test stopped at 1.5 minutes approximately. Saturation on room air 96%  with a heart rate of 73. No significant desaturation on exertion. CONCLUSION:  1. Moderate obstructive defect with air trapping. 2.  Not able to perform DLCO due to the patient had difficulty with  testing. 3.  Six-minute walk incomplete, the patient walked only 80 feet with no  significant desaturation.         Rachel Michelle MD    D: 2021 8:55:40       T: 2021 11:28:49     /[email or web address]  Job#: [id number]     Doc#: [id number]    CC:

## 2021-06-24 NOTE — PROGRESS NOTES
[facility]                                                          TELEHEALTH EVALUATION: Service performed was Audio/Visual (During SFICX-67 public health emergency) and not a face-to-face visit           CHIEF COMPLAINT: Follow-up PET scan        HPI:   PET scan reviewed by me and noted below. Results were dicussed with patient and multiple good questions were answered. From prior visit:   66years old with history of COPD had a chest x-ray 2/19/2021 that showed nodule. CT scan was done 2/19/2021 to further evaluate abnormal chest x-ray. CT showed small RUL spiculated nodule. Not sure what makes better or worse. No associated hemoptysis. Smoker for 61 years, on average 1 pack/day. Has shortness of breath on exertion and cough with brown sputum. Uses albuterol once or twice a day. Old records were reviewed and summarized by me. Past Medical History:   Diagnosis Date    Arthritis     CAD (coronary artery disease)     [facility]     Diabetes mellitus [facility]     Hyperlipidemia     Hypertension        Past Surgical History:        Procedure Laterality Date    CORONARY ANGIOPLASTY WITH STENT PLACEMENT      FEMORAL BYPASS Bilateral     FEMUR FRACTURE SURGERY Left 3/5/2021    LEFT FEMUR INTRAMEDULLARY NAIL GRAZYNA INSERTION performed by Leonela Jimenez MD at [address] [place] [address]      LEFT FEMUR INTRAMEDULLARY NAIL GRAZYNA INSERTION (Left Hip    UPPER GASTROINTESTINAL ENDOSCOPY N/A 2/19/2021    EGD CONTROL HEMORRHAGE performed by Alma Baxter MD at [facility]:  is allergic to no known allergies. Social History:    TOBACCO:   reports that he has been smoking cigarettes. He has a 91.50 pack-year smoking history. He has never used smokeless tobacco.  ETOH:   reports no history of alcohol use.       Family History:   No lung cancer       Current Medications:    Current Outpatient Medications:     tiotropium (SPIRIVA RESPIMAT) 2.5 MCG/ACT AERS inhaler, Inhale 2 puffs into the lungs daily, Disp: 1 Inhaler, Rfl: 5    Misc. Devices (ROLLING WALKER/BURGUNDY) MISC, 1 Device by Does not apply route as needed (Status post  hip surgery), Disp: 1 each, Rfl: 0    enoxaparin (LOVENOX) 40 MG/0.4ML injection, Inject 0.4 mLs into the skin daily, Disp: 20 Syringe, Rfl: 0    albuterol sulfate HFA (PROAIR HFA) 108 (90 Base) MCG/ACT inhaler, Inhale 2 puffs into the lungs every 6 hours as needed for Wheezing, Disp: 1 Inhaler, Rfl: 3    acetaminophen (TYLENOL) 325 MG tablet, Take 2 tablets by mouth every 4 hours as needed for Pain or Fever Maximum dose of acetaminophen is 4000 mg from all sources in 24 hours. , Disp: 120 tablet, Rfl: 3    ipratropium-albuterol (DUONEB) 0.5-2.5 (3) MG/3ML SOLN nebulizer solution, Inhale 3 mLs into the lungs 3 times daily, Disp: 360 mL, Rfl: 0    mometasone-formoterol (DULERA) 200-5 MCG/ACT inhaler, Inhale 2 puffs into the lungs 2 times daily Substituted for Budesonide-Formoterol (SYMBICORT). , Disp: 1 Inhaler, Rfl: 0    atorvastatin (LIPITOR) 80 MG tablet, Take 1 tablet by mouth daily, Disp: 30 tablet, Rfl: 0    amLODIPine (NORVASC) 10 MG tablet, Take 0.5 tablets by mouth nightly Note: new lower dose, Disp: 30 tablet, Rfl: 3    furosemide (LASIX) 40 MG tablet, Take 1 tablet by mouth daily, Disp: 60 tablet, Rfl: 0    trospium (SANCTURA) 60 MG CP24 extended release capsule, Take 1 capsule by mouth daily, Disp: 30 capsule, Rfl: 0    insulin glargine (LANTUS SOLOSTAR) 100 UNIT/ML injection pen, Inject 20 Units into the skin nightly, Disp: 5 Pen, Rfl: 3    insulin lispro (HUMALOG KWIKPEN) 100 UNIT/ML pen, Inject 0-6 Units into the skin 3 times daily (before meals) Low Dose Correction Algorithm: BS  No Insulin, -199 1 Unit, 200-249 2 Units, 250-299 3 Units, 300-349 4 Units, 350-399 5 Units, 400 and above give 6 Units, Disp: 5 Pen, Rfl: 3    carvedilol (COREG) 6.25 MG tablet, Take 6.25 mg by mouth 2 times daily, Disp: , Rfl:     aspirin 81 MG chewable tablet, Take 81 mg by mouth daily, Disp: , Rfl:     lisinopril (PRINIVIL;ZESTRIL) 40 MG tablet, Take 40 mg by mouth daily, Disp: , Rfl:     pantoprazole sodium (PROTONIX) 40 MG PACK packet, Take 40 mg by mouth every morning (before breakfast), Disp: , Rfl:     SITagliptin-MetFORMIN HCl ER (JANUMET XR) 100-1000 MG TB24, Take 1 tablet by mouth daily, Disp: , Rfl:     clopidogrel (PLAVIX) 75 MG tablet, Take 75 mg by mouth daily, Disp: , Rfl:     potassium chloride (KLOR-CON M) 20 MEQ extended release tablet, Take 1 tablet by mouth daily for 7 days, Disp: 7 tablet, Rfl: 0    albuterol sulfate HFA (PROVENTIL HFA) 108 (90 BASE) MCG/ACT inhaler, Inhale 2 puffs into the lungs every 4 hours as needed for Wheezing or Shortness of Breath With spacer (and mask if indicated). Thanks. , Disp: 1 Inhaler, Rfl: 1            Objective:   PHYSICAL EXAM:    There were no vitals taken for this visit.' on RA    Constitutional:  No acute distress. Appears well developed and nourished. Eyes: No sclera icterus. EOM intact. No visible discharge. HENT: Head is normocephalic and atraumatic. Mucus membranes are moist and the tongue appears normal. Normal appearing nose. External Ears normal.   Neck: No visualized mass. Lesly Kid is midline   Resp: No accessory muscle use. Respiratory effort normal. No visualized signs of difficulty breathing or respiratory distress. Cardiovascular: No LE edema per patient's report   Musculoskeletal: No signs of ataxia. Normal range of motion of the neck. Skin: No significant exanthematous lesions or discoloration noted on facial skin    Neuro: Awake. Alert. Able to follow commands.                           DATA reviewed by me:   PFTs 6/16/2021 FVC 2.35 (64%) FEV1 1.30 (50%) FEV1/FVC 55% TLC 7.60 (120%) DLCO (%) 6MW 80 feet, submaximal exercise due to unsteady gait    CT chest 2/19/2021 imaging reviewed by me and showed  RUL spiculated nodule    PET scan 6/22/2021 imaging reviewed by me and showed  Irregular nodule in the right upper lobe seen on recent CT scan, is mildly  hypermetabolic, suspicious for lung carcinoma given its underlying morphology  Pleural calcifications are seen on the left, either due to prior infection,  talc pleurodesis, or asbestos related pleural thickening  Hypermetabolic soft tissue nodules are seen in both the right and left  parotid gland, suspicious for parotid gland neoplasm. Indeterminate nodule projecting superiorly off the right kidney, not clearly  a simple cyst..  This appears increased in size since 2017 CT scan. Consider  pre and post-contrast abdominal CT scan. Low  Nodularity of the thyroid gland is seen, incompletely evaluated. This most  likely represents multinodular goiter. Consider thyroid ultrasound for  further characterization. Assessment:       · RUL nodule on CT chest 2/19/21 concerning for bronchogenic carcinoma. SUV is 1.78 on PET scan 6/22/2021. · Moderate COPD/pulmonary emphysema  · Left-sided pleural calcifications   · Abnormal PET and parotid gland and  thyroid  · Right kidney growing nodule on PET scan  · Pulmonary emphysema   · 61 pack year smoking       Plan:        Option of Bx to confirm malignancy, nodule resection, and SBRT were all discussed with patient, son, and multiple family members who accompanied the patient today. Patient and family undecided on whether want to pursue evaluation/treatment for the nodule or not. All verbalized understanding that nodule is concerning for bronchogenic carcinoma and delaying work-up/treatment could lead to worse outcome including metastatic disease.    Continue Spiriva and albuterol   ENT evaluation for abnormal PET scan in parotid gland and thyroid   Urology evaluation for right kidney nodule   Smoking cessation counseling        Varghese Joy son phone number is [phone]    Radha Nayak is a 66 y.o. male being evaluated by a Virtual Visit (video visit) encounter to address concerns as mentioned above. A caregiver was present when appropriate. Due to this being a TeleHealth encounter (During XKCQP-58 public health emergency), evaluation of the following organ systems was limited: Vitals/Constitutional/EENT/Resp/CV/GI//MS/Neuro/Skin/Heme-Lymph-Imm. Pursuant to the emergency declaration under the [address] [place] [address] authority and the [employer] Act, this Virtual Visit was conducted with patient's (and/or legal guardian's) consent, to reduce the patient's risk of exposure to COVID-19 and provide necessary medical care. The patient (and/or legal guardian) has also been advised to contact this office for worsening conditions or problems, and seek emergency medical treatment and/or call 911 if deemed necessary. Services were provided through a video synchronous discussion virtually to substitute for in-person clinic visit. Patient was located in her home, provider was located in his office. --Jas Ennis MD on 6/24/2021 at 2:19 PM    An electronic signature was used to authenticate this note.

## 2021-06-24 NOTE — TELEPHONE ENCOUNTER
Left message asking Tae Dominique to return call to office for after visit care plan. Patient needs to be seen in 4 weeks and set up with urology and ENT. Will confirm patient schedules both referrals. LOV: 6/24/21    Assessment:       · RUL nodule on CT chest 2/19/21 concerning for bronchogenic carcinoma. SUV is 1.78 on PET scan 6/22/2021. · Moderate COPD/pulmonary emphysema  · Left-sided pleural calcifications   · Abnormal PET and parotid gland and  thyroid  · Right kidney growing nodule on PET scan  · Pulmonary emphysema   · 61 pack year smoking       Plan:       · Option of Bx to confirm malignancy, nodule resection, and SBRT were all discussed with patient, son, and multiple family members who accompanied the patient today. Patient and family undecided on whether want to pursue evaluation/treatment for the nodule or not. All verbalized understanding that nodule is concerning for bronchogenic carcinoma and delaying work-up/treatment could lead to worse outcome including metastatic disease.   · Continue Spiriva and albuterol  · ENT evaluation for abnormal PET scan in parotid gland and thyroid  · Urology evaluation for right kidney nodule  · Smoking cessation counseling

## 2021-06-24 NOTE — LETTER
[facility] Pulmonary, Critical Care, and Sleep  [address] [facility] [address] [facility]  [address] [id number]  Phone: [phone]  Fax: [phone]    Grace Gardner MD    June 24, 2021     [facility]    Patient: Tara Coker   MR Number: <[id number]>   YOB: 1945   Date of Visit: 6/24/2021       Dear Fredy Carroll:    Thank you for referring Tara Coker to me for evaluation/treatment. Below are the relevant portions of my assessment and plan of care. If you have questions, please do not hesitate to call me. I look forward to following Roosevelt Presser along with you.     Sincerely,        Grace Gardner MD

## 2021-06-24 NOTE — PATIENT INSTRUCTIONS
Remember to bring all pulmonary medications to your next appointment with the office. Please keep all of your future appointments scheduled by [facility]. Out of respect for other patients and providers, you may be asked to reschedule your appointment if you arrive later than your scheduled appointment time. Appointments cancelled less than 24hrs in advance will be considered a no show. Patients with three missed appointments within 1 year or four missed appointments within 2 years can be dismissed from the practice. Tips to Help You Stop Smoking (taken from Up-To-Date)      Cigarette smoking is a preventable cause of death in the [place]. Quitting smoking now can decrease your risk of getting smoking-related illnesses like heart disease, stroke, cancer & COPD. S = Set a quit date. T = Tell family, friends, and the people around you that you plan to quit. A = Anticipate or plan ahead for the tough times you'll face while quitting. R = Remove cigarettes and other tobacco products from your home, car, and work. T = Talk to your doctor about getting help to quit. Your doctor may give you medicines to reduce your craving for cigarettes & the unpleasant symptoms that happen when you stop smoking (called withdrawal symptoms). What are the symptoms of withdrawal?  The symptoms include:   ?Trouble sleeping   ? Being irritable, anxious or restless   ? Getting frustrated or angry   ? Having trouble thinking clearly  Nicotine replacement therapy eases withdrawal and reduces your bodys craving for nicotine, the main drug found in cigarettes. Non-prescription forms of nicotine replacement include skin patches, lozenges, and gum. Two prescription medications are available that have been proven to help people stop smoking:  ? Bupropion is a prescription medicine that reduces your desire to smoke.  This medicine is sold under the brand names Zyban and Wellbutrin & as a generic formulation. ? Varenicline (brand name: Chantix) is a prescription medicine that reduces withdrawal symptoms and cigarette cravings. If you take bupropion or varenicline and you have any new or worsening depressed mood or thoughts of harming yourself or someone else, stop taking the medicine and call your doctor. Buproprion should not be taken by anyone with a history of seizure or epilepsy. Will I gain weight if I quit?  Yes, you might gain a few pounds. But quitting smoking will have a much more positive effect on your health than weighing a few pounds more. Plus, you can help prevent some weight gain by being more active and eating less. Taking the medicine bupropion might help control weight gain. What else can I do to improve my chances of quitting?  You can:  ?Start exercising. ?Stay away from smokers and places that you associate with smoking. If people close to you smoke, ask them to quit with you. ? Keep gum, hard candy, or something to put in your mouth handy. If you get a craving for a cigarette, try one of these instead. ?Dont give up, even if you start smoking again. It takes most people a few tries before they succeed. You can also get help from a free phone line (1-214-QUIT-NOW) or go online to www.smokefree.gov. Your pulmonary team is here to help you quit.   Call for assistance [phone]

## 2021-06-28 NOTE — TELEPHONE ENCOUNTER
Hebert Palencia was informed on both referrals and he was given both numbers to set up. Will continue to watch to make sure patient schedules.

## 2021-07-20 NOTE — TELEPHONE ENCOUNTER
Per Epic pt no show to NPT ENT appt on 7/9/21 and has not r/s. Spoke with Earma Barges with Urology states patient appt was cancelled on 7/16/21 and r/s to 8/4/21.  Will request that note if pt completes

## 2021-08-09 NOTE — TELEPHONE ENCOUNTER
It does not appear patient r/s ENT appt. Called Urology pt did complete OV requested OV notes watch for raj.  Left message asking patient to return call to office to see if he is going to r/s his ENT appt

## 2021-08-14 NOTE — ED NOTES
1824- Call placed to Case Management dept.  Left message on [employer] phone, placed face sheet in the drawchilo Alvarez  08/14/21 1825

## 2021-08-14 NOTE — ED NOTES
Patient being discharged home, discharge instructions printed and reviewed with patient, denies any questions. Patient left ed via wheelchair, no signs of distress.      Benjamin Olivas RN  08/14/21 [id number]

## 2021-08-14 NOTE — ED NOTES
Ambulated patient with walker to room 9 from room 7 and back with a walker, patient did not need any assistance.      Benjamin Olivas RN  08/14/21 [id number]

## 2021-08-16 NOTE — CARE COORDINATION
INTERDISCIPLINARY PLAN OF CARE CONFERENCE    Date/Time: 8/16/2021 5:11 PM  Completed by: Vonnie Singh RN, Case Management      Patient Name:  Antonieta Laguerre  YOB: 1945  Admitting Diagnosis: No admission diagnoses are documented for this encounter. Admit Date/Time:  8/14/2021 12:37 PM    Chart reviewed. Interdisciplinary team contacted or reviewed plan related to patient progress and discharge plans. Disciplines included Case Management, Nursing, and Dietitian. Current Status:Discahrged from ED on 8/14/2021  Discharge Plan Comments: Reviewed chart. Pt was in the ED on 8/14/2021 and discharged from the ED. There was a referral for [facility] for CM. Dorian 16 called pt's phone at [phone] and the phone number was not accept ing calls form us. CM called pt's POA, with the same name, Marci Pearce--pt's son ([phone]). Per Marci Loera (the pt's son) pt is of sound mind and his own legal guardian. He has [address] [employer] involved and he has been speaking to [employer] from [employer] regarding pt. He stated that pt does not wants HC in the home and when they do come he fires them or garcia snot open the door. JUICE explained to Marci Loera (the son) that if pt is of sound mind, that people do make bad decisions. JUICE informed him that he is speaking to [employer], who is also with [employer], and that she is a great Resource. Marci Loera (the son) verbalized understanding. No further needs needed. HC not set up for pt as Marci Loera (son) knows pt will not want.

## 2021-08-16 NOTE — TELEPHONE ENCOUNTER
Tried calling patient, no answer unable to LM, received a message stating the person you are calling is not accepting calls at this time.

## 2021-08-18 NOTE — ED PROVIDER NOTES
[address] ED  EMERGENCY DEPARTMENT ENCOUNTER        Pt Name: Claudell Glasgow  MRN: [id number]  [place] 1945  Date of evaluation: 8/14/2021  Provider: GAMAL Tapia  PCP: Dana Gutierrez    This patient was not seen and evaluated by the attending physician No att. providers found. I have evaluated this patient. My supervising physician was available for consultation. CHIEF COMPLAINT       Chief Complaint   Patient presents with    Back Pain     patient states he slipped yesterday in the kitchen and now states he has a knot on the lower side of his back that he is concerned about       HISTORY OF PRESENT ILLNESS   (Location/Symptom, Timing/Onset, Context/Setting, Quality, Duration, Modifying Factors, Severity)  Note limiting factors. Claudell Glasgow is a 66 y.o. male who presents via private vehicle from his home for evaluation of back pain. ED Course as of Aug 18 0057   Sat Aug 14, 2021   1452 He fell yesterday, he was walking with a walker and he slipped and fell. He denies hitting his head. He deies LOC. He hit his left elbow. He has difficulty with ambulation. He has hx of left hip fx. He lives by himself. He is on a blood thinner he thinks. [CS]   1664 He is on lovenox, asprina and plavix. He notes yesterday he was on the ground for several hours. His dauighter called [facility] and they came and got him up off the ground but he refused trasnport to the ER. This am he called his son and said he wanted to come to the ER fr exam because his back was with a \"knot\". He denies pain just the knot. He denies any new numbness or weakness in the legs. He denies any new problems with urination or defecation.     [CS]      ED Course User Index  [CS] GAMAL Tapia       Nursing Notes were all reviewed and agreed with or any disagreements were addressed  in the HPI. Pt was seen during the [place] 19 pandemic. Appropriate PPE worn by ME during patient encounters.  Pt seen during a time with constrained hospital bed capacity and other potential inpatient and outpatient resources were constrained due to the viral pandemic. REVIEW OF SYSTEMS    (2-9 systems for level 4, 10 or more for level 5)     Review of Systems    Positives and Pertinent negatives as per HPI. Except as noted abovein the ROS, all other systems were reviewed and negative. PAST MEDICAL HISTORY     Past Medical History:   Diagnosis Date    Arthritis     CAD (coronary artery disease)     [facility]     Diabetes mellitus [facility]     Hyperlipidemia     Hypertension          SURGICAL HISTORY     Past Surgical History:   Procedure Laterality Date    CORONARY ANGIOPLASTY WITH STENT PLACEMENT      FEMORAL BYPASS Bilateral     FEMUR FRACTURE SURGERY Left 3/5/2021    LEFT FEMUR INTRAMEDULLARY NAIL GRAZYNA INSERTION performed by Rahul Boykin MD at [address] [place] [address]      LEFT FEMUR INTRAMEDULLARY NAIL GRAZYNA INSERTION (Left Hip    UPPER GASTROINTESTINAL ENDOSCOPY N/A 2/19/2021    EGD CONTROL HEMORRHAGE performed by Vick Ovalle MD at [place] [address].       Discharge Medication List as of 8/14/2021  6:15 PM      CONTINUE these medications which have NOT CHANGED    Details   tiotropium (SPIRIVA RESPIMAT) 2.5 MCG/ACT AERS inhaler Inhale 2 puffs into the lungs daily, Disp-1 Inhaler, R-5Normal      Misc.  Devices (ROLLING WALKER/BURGUNDY) MISC 1 Device by Does not apply route as needed (Status post  hip surgery), Disp-1 each, R-0Print      potassium chloride (KLOR-CON M) 20 MEQ extended release tablet Take 1 tablet by mouth daily for 7 days, Disp-7 tablet, R-0NO PRINT      enoxaparin (LOVENOX) 40 MG/0.4ML injection Inject 0.4 mLs into the skin daily, Disp-20 Syringe, R-0Print      albuterol sulfate HFA (PROAIR HFA) 108 (90 Base) MCG/ACT inhaler Inhale 2 puffs into the lungs every 6 hours as needed for Wheezing, Disp-1 Inhaler, R-3Print      acetaminophen (TYLENOL) 325 MG tablet Take 2 tablets by mouth every 4 hours as needed for Pain or Fever Maximum dose of acetaminophen is 4000 mg from all sources in 24 hours. , Disp-120 tablet, R-3OTC      ipratropium-albuterol (DUONEB) 0.5-2.5 (3) MG/3ML SOLN nebulizer solution Inhale 3 mLs into the lungs 3 times daily, Disp-360 mL, R-0Print      mometasone-formoterol (DULERA) 200-5 MCG/ACT inhaler Inhale 2 puffs into the lungs 2 times daily Substituted for Budesonide-Formoterol (SYMBICORT). , Disp-1 Inhaler, R-0Print      atorvastatin (LIPITOR) 80 MG tablet Take 1 tablet by mouth daily, Disp-30 tablet, R-0Print      amLODIPine (NORVASC) 10 MG tablet Take 0.5 tablets by mouth nightly Note: new lower dose, Disp-30 tablet, R-3Print      furosemide (LASIX) 40 MG tablet Take 1 tablet by mouth daily, Disp-60 tablet, R-0Normal      trospium (SANCTURA) 60 MG CP24 extended release capsule Take 1 capsule by mouth daily, Disp-30 capsule, R-0Print      insulin glargine (LANTUS SOLOSTAR) 100 UNIT/ML injection pen Inject 20 Units into the skin nightly, Disp-5 Pen, R-3Print      insulin lispro (HUMALOG KWIKPEN) 100 UNIT/ML pen Inject 0-6 Units into the skin 3 times daily (before meals) Low Dose Correction Algorithm: BS  No Insulin, -199 1 Unit, 200-249 2 Units, 250-299 3 Units, 300-349 4 Units, 350-399 5 Units, 400 and above give 6 Units, Disp-5 Pen, R-3Print      carvedilol (COREG) 6.25 MG tablet Take 6.25 mg by mouth 2 times dailyHistorical Med      aspirin 81 MG chewable tablet Take 81 mg by mouth daily      lisinopril (PRINIVIL;ZESTRIL) 40 MG tablet Take 40 mg by mouth daily      pantoprazole sodium (PROTONIX) 40 MG PACK packet Take 40 mg by mouth every morning (before breakfast)      SITagliptin-MetFORMIN HCl ER (JANUMET XR) 100-1000 MG TB24 Take 1 tablet by mouth daily      clopidogrel (PLAVIX) 75 MG tablet Take 75 mg by mouth daily               ALLERGIES     No known allergies    FAMILYHISTORY History reviewed. No pertinent family history. SOCIAL HISTORY       Social History     Socioeconomic History    Marital status:      Spouse name: None    Number of children: None    Years of education: None    Highest education level: None   Occupational History    None   Tobacco Use    Smoking status: Current Every Day Smoker     Packs/day: 1.00     Years: 61.00     Pack years: 61.00     Types: Cigarettes    Smokeless tobacco: Never Used    Tobacco comment: 1 pack a day   Vaping Use    Vaping Use: Never used   Substance and Sexual Activity    Alcohol use: No    Drug use: No    Sexual activity: Not Currently   Other Topics Concern    None   Social History Narrative    None     Social Determinants of Health     Financial Resource Strain:     Difficulty of Paying Living Expenses:    Food Insecurity:     Worried About Running Out of Food in the Last Year:     Ran Out of Food in the Last Year:    Transportation Needs:     Lack of Transportation (Medical):      Lack of Transportation (Non-Medical):    Physical Activity:     Days of Exercise per Week:     Minutes of Exercise per Session:    Stress:     Feeling of Stress :    Social Connections:     Frequency of Communication with Friends and Family:     Frequency of Social Gatherings with Friends and Family:     Attends Mandaen Services:     Active Member of Clubs or Organizations:     Attends Club or Organization Meetings:     Marital Status:    Intimate Partner Violence:     Fear of Current or Ex-Partner:     Emotionally Abused:     Physically Abused:     Sexually Abused:        SCREENINGS    Christina Coma Scale  Eye Opening: Spontaneous  Best Verbal Response: Oriented  Best Motor Response: Obeys commands  Christina Coma Scale Score: 15        PHYSICAL EXAM    (up to 7 for level 4, 8 or more for level 5)     ED Triage Vitals   BP Temp Temp Source Pulse Resp SpO2 Height Weight   08/14/21 1250 08/14/21 1247 08/14/21 1247 08/14/21 1247 08/14/21 1247 08/14/21 1247 -- 08/14/21 1247   139/61 97.8 °F (36.6 °C) Oral 73 18 97 %  167 lb (75.8 kg)       Physical Exam  Vitals and nursing note reviewed. Constitutional:       General: He is awake. He is not in acute distress. Appearance: He is well-developed. He is not ill-appearing, toxic-appearing or diaphoretic. HENT:      Head: Normocephalic and atraumatic. No raccoon eyes, Fraser's sign, abrasion, contusion, right periorbital erythema or left periorbital erythema. Jaw: There is normal jaw occlusion. Right Ear: Hearing, tympanic membrane, ear canal and external ear normal. No hemotympanum. Left Ear: Hearing, tympanic membrane, ear canal and external ear normal. No hemotympanum. Nose: Nose normal. No nasal deformity, signs of injury or laceration. Right Nostril: No epistaxis or septal hematoma. Left Nostril: No epistaxis or septal hematoma. Mouth/Throat:      Lips: Pink. Mouth: Mucous membranes are moist. No injury. Pharynx: Oropharynx is clear. Uvula midline. Eyes:      General: Lids are normal. Vision grossly intact. Gaze aligned appropriately. No visual field deficit. Right eye: No discharge. Left eye: No discharge. Extraocular Movements: Extraocular movements intact. Right eye: No nystagmus. Left eye: No nystagmus. Conjunctiva/sclera: Conjunctivae normal.   Neck:      Trachea: Trachea and phonation normal.   Cardiovascular:      Rate and Rhythm: Normal rate and regular rhythm. Pulses: Normal pulses. Heart sounds: Normal heart sounds. No murmur heard. No friction rub. No gallop. Pulmonary:      Effort: Pulmonary effort is normal. No respiratory distress. Breath sounds: Normal breath sounds. No wheezing or rales. Chest:      Chest wall: No mass, lacerations, deformity, swelling, tenderness, crepitus or edema. Abdominal:      General: Abdomen is flat.  Bowel sounds are normal. There is no distension or abdominal bruit. There are no signs of injury. Palpations: Abdomen is soft. Tenderness: There is no abdominal tenderness. Musculoskeletal:         General: No deformity. Cervical back: Normal, normal range of motion and neck supple. Thoracic back: Normal.      Lumbar back: Tenderness and bony tenderness present. No swelling, edema, deformity, lacerations or spasms. Right hip: Normal strength. Left hip: Normal strength. Right lower leg: No edema. Left lower leg: No edema. Comments:  Strength 5/5, sensation intact- Motor strength 5/5 and symmetric to UE Deltoids/trapezius flexion and extension, biceps elbow flexion and extension  and wrist extensors.  strength intact and symmetric. Sensation to light touch intact and symmetric to bilateral UE dermatomes. Strength 5/5 symmetric to LE hip flexors, adductors, abductions, knee flexion and extension and ankle dorsiflexion plantar flexion. Sensation to light touch intact in bilateral LE dermatomes. Gait intact. Skin:     General: Skin is warm and dry. Capillary Refill: Capillary refill takes less than 2 seconds. Coloration: Skin is not pale. Findings: No abrasion, bruising, ecchymosis, signs of injury, laceration or lesion. Neurological:      General: No focal deficit present. Mental Status: He is alert, oriented to person, place, and time and easily aroused. GCS: GCS eye subscore is 4. GCS verbal subscore is 5. GCS motor subscore is 6. Cranial Nerves: Cranial nerves are intact. No facial asymmetry. Sensory: Sensation is intact. Motor: Motor function is intact. No weakness, abnormal muscle tone or pronator drift. Coordination: Coordination is intact. Finger-Nose-Finger Test normal.      Gait: Gait is intact.    Psychiatric:         Attention and Perception: Attention and perception normal.         Behavior: Behavior is agitated (cursing at his family and staff ).          Cognition and Memory: Cognition normal.            DIAGNOSTIC RESULTS   LABS:    Labs Reviewed   CULTURE, URINE - Abnormal; Notable for the following components:       Result Value    Organism Enterococcus faecalis (*)     All other components within normal limits    Narrative:     ORDER#: [id number]                          ORDERED BY: Tolu Ortiz  SOURCE: Urine Clean Catch                  COLLECTED:  08/14/21 17:31  ANTIBIOTICS AT JAH.:                      RECEIVED :  08/16/21 18:23  Performed at:  [place] [address]   Phone [phone]   URINALYSIS - Abnormal; Notable for the following components:    Protein,  (*)     All other components within normal limits    Narrative:     Performed at:  [facility] [employer],  [employer] [facility]   Phone [phone]   CBC WITH AUTO DIFFERENTIAL - Abnormal; Notable for the following components:    RBC 4.12 (*)     Hemoglobin 11.4 (*)     Hematocrit 35.2 (*)     Lymphocytes Absolute 0.9 (*)     All other components within normal limits    Narrative:     Performed at:  [facility] [employer],  [employer] [facility]   Phone [phone]   COMPREHENSIVE METABOLIC PANEL W/ REFLEX TO MG FOR LOW K - Abnormal; Notable for the following components:    Glucose 145 (*)     BUN 21 (*)     Alkaline Phosphatase 169 (*)     ALT 7 (*)     AST 11 (*)     All other components within normal limits    Narrative:     Performed at:  [facility] [employer],  [employer] [facility]   Phone [phone]   MICROSCOPIC URINALYSIS - Abnormal; Notable for the following components:    Bacteria, UA 3+ (*)     All other components within normal limits    Narrative:     Performed at:  [facility]) [facility],  [facility]   Phone [phone], RAPID    Narrative:     Performed at:  [facility] [address],  [place], [facility]   Phone [phone]   CK    Narrative:     Performed at:  [facility] - [facility],  [place], [facility]   Phone [phone]       All other labs were within normal range or not returned as of this dictation. EKG: All EKG's are interpreted by the Emergency Department Physician who either signs orCo-signs this chart in the absence of a cardiologist.  Please see their note for interpretation of EKG. RADIOLOGY:   Non-plain film images such as CT, Ultrasound and MRI are read by the radiologist. [facility] radiographic images are visualized andpreliminarily interpreted by the  ED Provider with the below findings:        Interpretation perthe Radiologist below, if available at the time of this note:    CT LUMBAR SPINE WO CONTRAST   Final Result   No acute osseous abnormality of the thoracic or lumbar spine. CT THORACIC SPINE WO CONTRAST   Final Result   No acute osseous abnormality of the thoracic or lumbar spine. CT CERVICAL SPINE WO CONTRAST   Final Result   No cervical compression, steven or retrolisthesis. Several nodules were noted in the thyroid. Recommend a non emergent thyroid   ultrasound. CT HEAD WO CONTRAST   Final Result   Atrophy and small vessel ischemic disease. XR RIBS BILATERAL (MIN 4 VIEWS)   Final Result   Left ventricular enlargement. COPD. Calcified pleural plaque along the left lateral chest wall with adjacent   peripheral fibrosis. No interstitial edema. No rib fracture or pneumothorax. Osteopenia. No results found.        PROCEDURES   Unless otherwise noted below, none     Procedures    CRITICAL CARE TIME   N/A    CONSULTS:  IP CONSULT TO CASE MANAGEMENT      EMERGENCY DEPARTMENT COURSE and DIFFERENTIALDIAGNOSIS/MDM:   Vitals: Vitals:    08/14/21 1501 08/14/21 1615 08/14/21 1700 08/14/21 1730   BP: (!) 163/69 (!) 164/75 (!) 172/73 (!) 192/91   Pulse: 72 80 80 78   Resp: 16 13 12 20   Temp:       TempSrc:       SpO2: 99% 97% 96% 99%   Weight:           Patient was given thefollowing medications:  Medications   ipratropium-albuterol (DUONEB) nebulizer solution 1 ampule (1 ampule Inhalation Given 8/14/21 1611)   0.9 % sodium chloride bolus (0 mLs Intravenous Stopped 8/14/21 1716)   cephALEXin (KEFLEX) capsule 500 mg (500 mg Oral Given 8/14/21 1810)       PDMP Monitoring:    Last PDMP [place] as Reviewed [facility]):  Review User Review Instant Review Result            Urine Drug Screenings (1 yr)    No resulted procedures found. Medication Contract and Consent for Opioid Use Documents Filed      No documents found                MDM:   Patient seen and evaluated. Old records reviewed. Diagnostic testing reviewed and results discussed. I have independently evaluated this patient based upon my scope of practice. Supervising physician was in the department for consultation as needed. Patient is a 73-year-old male who presents with his family for evaluation of back pain after a fall. On exam he is alert he is oriented appropriately, afebrile well-perfused hemodynamically stable nontoxic in appearance. He has no sign of penetrating trauma. No evidence of basilar skull fracture. He has no cervical spine tenderness to palpation however he does have lower lumbar tenderness without bony step-off crepitus or overlying deformity. He is with upper and lower extremity range of motion and strength intact with normal tone. He is distally neurovascularly intact. Work-up in the emergency department included CT imaging urinalysis and basic labs. Urine within normal limits. Slight bacteriuria appreciated, I have low concern for acute cystitis. We will send a urine culture. CMP CBC is remarkable for chronic appearing anemia.   CMP is remarkable for mild hyperglycemia, no anion gap I have low concern for DKA. Mildly elevated BUN, similar to prior I have low concern for KINSEY. CK is within normal limits I have low concern for rhabdomyolysis. Patient is ambulatory in the department. Patient was offered but vehemently denied SNF placement. I voiced my concern that patient did have a fall and I do not want him to fall again however patient is currently of sound mind and judgment and I believe he is currently capable of making his own medical decisions and he does not want to stay in the hospital for PT OT evaluation and given that he was able to demonstrate to me that he is ambulatory at this point I do believe he is a reasonable candidate for discharge home. I will place a consult for case management to arrange for home physical therapy services to help with patient's mobility. Based on patient's clinical history and clinical findings, and absence of peritonitis, sepsis, or toxicity I currently estimate there is low risk for: Intracranial hemorrhage, intra-abdominal injury, perforated bowel, subdural hematoma, AAA, TAA, cauda equina or central cord syndrome, compartment syndrome, epidural mass or lesion, herniated disc causing severe stenosis, tendon or NV injury, fracture or dislocation. We have discussed the symptoms which are most concerning (e.g., bloody stool, fever, changing or worsening pain, vomiting) that necessitate immediate return. Pt is in agreement with the current plan and all questions were addressed. Discharge Time out:  CC Reviewed Yes   Test Results Yes     Vitals:    08/14/21 1730   BP: (!) 192/91   Pulse: 78   Resp: 20   Temp:    SpO2: 99%              FINAL IMPRESSION      1. Fall from standing, initial encounter    2. Strain of lumbar region, initial encounter    3. Tobacco dependence    4.  Bacteriuria          DISPOSITION/PLAN   DISPOSITION Decision To Discharge 08/14/2021 06:10:01 PM      PATIENT REFERREDTO:  [employer] [address] [facility]  [phone]    Schedule an appointment as soon as possible for a visit   For a recheck in 2-3   days, sooner if no improvement or worsening of symptoms    [place] [employer] [place] [employer] [place] ([facility]) [facility] PHYSICAL [facility] ED  [address] [facility]  Go to   If symptoms worsen      DISCHARGE MEDICATIONS:  Discharge Medication List as of 8/14/2021  6:15 PM      START taking these medications    Details   cephALEXin (KEFLEX) 500 MG capsule Take 1 capsule by mouth 4 times daily for 7 days, Disp-28 capsule, R-0Normal      lidocaine (LIDODERM) 5 % Place 1 patch onto the skin daily 12 hours on, 12 hours off., Disp-30 patch, R-0Normal             DISCONTINUED MEDICATIONS:  Discharge Medication List as of 8/14/2021  6:15 PM                 (Please note that portions ofthis note were completed with a voice recognition program.  Efforts were made to edit the dictations but occasionally words are mis-transcribed.)    Leatha Thurston (electronically signed)        Leatha Thurston  08/18/21 0222

## 2021-08-24 NOTE — TELEPHONE ENCOUNTER
Unable to reach patient regarding appts with ENT and urology. Is not currently brad for f/u with Dr Syeda Michelle.

## 2021-09-11 NOTE — CONSULTS
Pharmacy to Manage Heparin Infusion per [facility]    Dx: Impella Pump/STEMI  Pt wt = 75.8 kg. Baseline aPTT = 248. Spoke with RN and patient did not have purge solution ordered post cath. Told RN to order purge solution and that pharmacy follows the Impella pump heparin infusion. Patient received 06119 units of heparin. Told RN he will need to turn off the systemic heparin at this time since the patient will be well over therapeutic ranges. The purge solution needs to run and as the aPTT are checked throughout the day, systemic may need to be restarted if needed. Purge infusion running at 6.1 mL/hr. (305 units/hr). Rose Marie Glover PharmD  9/11/2021 6:00 AM      9/11/2021   Recent Labs     09/11/21  0833   APTT 34.3   - Purge currently running at 5.7 ml/hr  (285 units/hr). - Restart supplemental heparin infusion at 910 units/hr. - [facility]    9/11/2021 11:19 AM     9/11 1545  aPTT = 56.3 sec  Purge rate running at 5.5 ml/hr ( 275 units/hr)  Increase supplemental heparin rate to 1060 units/hr  Joseph Benavidez PharmD  9/11/2021 at 4:43 PM    9/11 2229  aPTT = 65.0 sec  Purge rate @5.7 (285 units/hr)  Continue current supplemental heparin rate of 1060 units/hr  Recheck aPTT in 4 hours Adebayo Lucio PharmD  9/11/2021 at 10:54 PM    9/12/2021  Recent Labs     09/12/21  0303   APTT 62.6*     Continue systemic heparin gtt at 1060 units/hr. Purge solution is 6 mL/hr (300 units/hr). Total heparin is 1360 units/hr. Recheck aPTT in 4 hours. Rose Marie Glover PharmD  9/12/2021 3:41 AM    9/12/2021  Recent Labs     09/12/21  0550   APTT 61.1*     Continue systemic supplemental heparin at 1060 units/hr. Purge solution at 6 mL/hr (300 units/hr). Total heparin is 1360 units/hr. Recheck aPTT in 4 hours.   Rose Marie Glover PharmD  9/12/2021 7:24 AM    9/12 @1010 aptt = 64.6 sec  Purge solution running at 5.6 ml/hr (280 units/hr)  Systemic heparin running at 1060 units/hr  Total heparin is 1340 units/hr  Recheck aPTT in 4hrs  [facility], RPH, R. Ph. 9/12/2021 11:36 AM    9/12 1515  aPTT = 91.7 sec  Purge running at 5.5 ml/hr (275 units/hr)  Reduced systemic heparin to 980 units/hr  Total heparin is 1255 units/hr  Recheck aPTT in 4 hr  Wade Berman PharmD  9/12/2021 at 4:59 PM    9/12/2021  Recent Labs     09/12/21  2200   APTT 82.3*     Continue systemic supplemental heparin at 980 units/hr. Purge solution at 6.5 mL/hr (325 units/hr). Total heparin at 1315 units/hr. Recheck aPTT in 4 hours. Lorinda Kanner, PharmD  9/12/2021 10:44 PM    9/13/2021  Recent Labs     09/13/21  0202   APTT 54.4*     Will increase systemic supplement heparin to 1060 units/hr. Purge solution running at 6 mL/hr (300 units/hr). Total heparin gtt at 1360 units/hr. Recheck aPTT in 4 hours.   Lorinda Kanner, PharmD  9/13/2021 2:33 AM    9/13 0630  aPTT - 55.6 sec  Purge running at 6.3 mL/hr (315 units/hr)  Increase heparin drip to 1200 units/hr   Total heparin at 1515 units/hr  Next [facility]PTT at [facility], PharmPHILIP 9/13/2021 7:01 AM

## 2021-09-11 NOTE — PROGRESS NOTES
Spoke with Dr. Destiny Torres at this time re: concerns with administering currently ordered Lisinopril and increased Cr 1.4 s/p PCI. Labs reviewed per Dr. Destiny Torres. Cr. 1.4 is remaining \"stable\" . OK per Dr. Destiny Torres to proceed with administration of Lisinopril.

## 2021-09-11 NOTE — FLOWSHEET NOTE
09/11/21 3963   Encounter Summary   Services provided to: Family   Referral/Consult From: [facility] Family members   Continue Visiting   (Pt's sister states that family is fearful)   Complexity of Encounter Low   Length of Encounter 15 minutes   Routine   Type Initial   Assessment Approachable; Anxious   Intervention Active listening;Explored feelings, thoughts, concerns;Sustaining presence/ Ministry of presence; Discussed relationship with God   Outcome Engaged in conversation;Expressed feelings/needs/concerns      provided support for patient's sister Julio C Andrade), who states that she and her brother were fearful when patient came in last night.

## 2021-09-11 NOTE — ED PROVIDER NOTES
[facility]  ED  EMERGENCY DEPARTMENTENCOUNTER      Pt Name: Nyla Sales  MRN: [id number]  [place] 1945  Date ofevaluation: 9/11/2021  Provider: Misael Nevarez MD    CHIEF COMPLAINT       Chief Complaint   Patient presents with    Chest Pain     called 911 for throat discomfort. EMS gave 324mg of ASA and nitro x 1         HISTORY OF PRESENT ILLNESS   (Location/Symptom, Timing/Onset,Context/Setting, Quality, Duration, Modifying Factors, Severity)  Note limiting factors. Nyla Sales is a 66 y.o. male  who  has a past medical history of Arthritis, CAD (coronary artery disease), COPD (chronic obstructive pulmonary disease) ([address] Diabetes mellitus ([facility] [address] Hyperlipidemia, and Hypertension. who presents to the emergency department for evaluation of throat discomfort. Patient reports an acute onset of discomfort in his throat. Reports that he has had to have his esophagus distended in the past and symptoms felt similar. Patient associated shortness of breath and EMS was called by the patient's son. He must arrival patient had T wave abnormalities on 3-lead and telemetry was obtained which showed ST elevations in the anterior leads with reciprocal changes in the inferior leads. Patient also noted to be in atrial fibrillation with RVR. Blood pressure is within normal limits. On arrival to the ED the patient is complaining of throat discomfort. Denies actual chest pains. Reports he does have a history of COPD and is a current smoker. Denies a history of previous heart attack. HPI    NursingNotes were reviewed. REVIEW OF SYSTEMS    (2-9 systems for level 4, 10 or more for level 5)     Review of Systems   Constitutional: Negative for activity change, chills and fever. HENT: Positive for sore throat. Negative for congestion, rhinorrhea, trouble swallowing and voice change. Eyes: Negative for photophobia and visual disturbance.    Respiratory: Positive for shortness of breath and wheezing. Cardiovascular: Negative for chest pain, palpitations and leg swelling. Gastrointestinal: Negative for nausea and vomiting. Endocrine: Negative for polydipsia and polyuria. Genitourinary: Negative for difficulty urinating and frequency. Musculoskeletal: Negative for back pain and gait problem. Skin: Negative for color change and rash. Neurological: Negative for light-headedness and headaches. Psychiatric/Behavioral: Negative for confusion. The patient is not nervous/anxious. All other systems reviewed and are negative. Except as noted above the remainder of the review of systems was reviewed and negative. PAST MEDICAL HISTORY     Past Medical History:   Diagnosis Date    Arthritis     CAD (coronary artery disease)     COPD (chronic obstructive pulmonary disease) ([facility] [address]     Diabetes mellitus ([facility] [address]     Hyperlipidemia     Hypertension          SURGICALHISTORY       Past Surgical History:   Procedure Laterality Date    CORONARY ANGIOPLASTY WITH STENT PLACEMENT      FEMORAL BYPASS Bilateral     FEMUR FRACTURE SURGERY Left 3/5/2021    LEFT FEMUR INTRAMEDULLARY NAIL GRAZYNA INSERTION performed by Emerald Forrest MD at [facility]      LEFT FEMUR INTRAMEDULLARY NAIL GRAZYNA INSERTION (Left Hip    UPPER GASTROINTESTINAL ENDOSCOPY N/A 2/19/2021    EGD CONTROL HEMORRHAGE performed by María Sánchez MD at [address]       Previous Medications    ACETAMINOPHEN (TYLENOL) 325 MG TABLET    Take 2 tablets by mouth every 4 hours as needed for Pain or Fever Maximum dose of acetaminophen is 4000 mg from all sources in 24 hours.     ALBUTEROL SULFATE HFA (PROAIR HFA) 108 (90 BASE) MCG/ACT INHALER    Inhale 2 puffs into the lungs every 6 hours as needed for Wheezing    ALBUTEROL SULFATE HFA (PROVENTIL HFA) 108 (90 BASE) MCG/ACT INHALER    Inhale 2 puffs into the lungs every 4 hours as needed for Wheezing or Shortness of Breath With spacer (and mask if indicated). Thanks. AMLODIPINE (NORVASC) 10 MG TABLET    Take 0.5 tablets by mouth nightly Note: new lower dose    ASPIRIN 81 MG CHEWABLE TABLET    Take 81 mg by mouth daily    ATORVASTATIN (LIPITOR) 80 MG TABLET    Take 1 tablet by mouth daily    CARVEDILOL (COREG) 6.25 MG TABLET    Take 6.25 mg by mouth 2 times daily    CLOPIDOGREL (PLAVIX) 75 MG TABLET    Take 75 mg by mouth daily    ENOXAPARIN (LOVENOX) 40 MG/0.4ML INJECTION    Inject 0.4 mLs into the skin daily    FUROSEMIDE (LASIX) 40 MG TABLET    Take 1 tablet by mouth daily    INSULIN GLARGINE (LANTUS SOLOSTAR) 100 UNIT/ML INJECTION PEN    Inject 20 Units into the skin nightly    INSULIN LISPRO (HUMALOG KWIKPEN) 100 UNIT/ML PEN    Inject 0-6 Units into the skin 3 times daily (before meals) Low Dose Correction Algorithm: BS  No Insulin, -199 1 Unit, 200-249 2 Units, 250-299 3 Units, 300-349 4 Units, 350-399 5 Units, 400 and above give 6 Units    IPRATROPIUM-ALBUTEROL (DUONEB) 0.5-2.5 (3) MG/3ML SOLN NEBULIZER SOLUTION    Inhale 3 mLs into the lungs 3 times daily    LIDOCAINE (LIDODERM) 5 %    Place 1 patch onto the skin daily 12 hours on, 12 hours off. LISINOPRIL (PRINIVIL;ZESTRIL) 40 MG TABLET    Take 40 mg by mouth daily    MISC. DEVICES (ROLLING WALKER/BURGUNDY) MISC    1 Device by Does not apply route as needed (Status post  hip surgery)    MOMETASONE-FORMOTEROL (DULERA) 200-5 MCG/ACT INHALER    Inhale 2 puffs into the lungs 2 times daily Substituted for Budesonide-Formoterol (SYMBICORT).     PANTOPRAZOLE SODIUM (PROTONIX) 40 MG PACK PACKET    Take 40 mg by mouth every morning (before breakfast)    POTASSIUM CHLORIDE (KLOR-CON M) 20 MEQ EXTENDED RELEASE TABLET    Take 1 tablet by mouth daily for 7 days    SITAGLIPTIN-METFORMIN HCL ER (JANUMET XR) 100-1000 MG TB24    Take 1 tablet by mouth daily    TIOTROPIUM (SPIRIVA RESPIMAT) 2.5 MCG/ACT AERS INHALER    Inhale 2 puffs into the lungs daily    TROSPIUM (SANCTURA) 60 MG CP24 EXTENDED RELEASE CAPSULE    Take 1 capsule by mouth daily            No known allergies    FAMILY HISTORY     History reviewed. No pertinent family history. SOCIAL HISTORY       Social History     Socioeconomic History    Marital status:      Spouse name: None    Number of children: None    Years of education: None    Highest education level: None   Occupational History    None   Tobacco Use    Smoking status: Current Every Day Smoker     Packs/day: 1.00     Years: 61.00     Pack years: 61.00     Types: Cigarettes    Smokeless tobacco: Never Used    Tobacco comment: 1 pack a day   Vaping Use    Vaping Use: Never used   Substance and Sexual Activity    Alcohol use: No    Drug use: No    Sexual activity: Not Currently   Other Topics Concern    None   Social History Narrative    None     Social Determinants of Health     Financial Resource Strain:     Difficulty of Paying Living Expenses:    Food Insecurity:     Worried About Running Out of Food in the Last Year:     Ran Out of Food in the Last Year:    Transportation Needs:     Lack of Transportation (Medical):      Lack of Transportation (Non-Medical):    Physical Activity:     Days of Exercise per Week:     Minutes of Exercise per Session:    Stress:     Feeling of Stress :    Social Connections:     Frequency of Communication with Friends and Family:     Frequency of Social Gatherings with Friends and Family:     Attends Mandaeism Services:     Active Member of Clubs or Organizations:     Attends Club or Organization Meetings:     Marital Status:    Intimate Partner Violence:     Fear of Current or Ex-Partner:     Emotionally Abused:     Physically Abused:     Sexually Abused:        SCREENINGS             PHYSICAL EXAM    (up to 7 for level 4, 8 or more for level 5)     ED Triage Vitals [09/11/21 0059]   BP Temp Temp Source Pulse Resp SpO2 Height Weight   108/71 98.2 °F (36.8 °C) Axillary 168 18 95 % 5' 7\" (1.702 m) 167 lb (75.8 kg)       Physical Exam  Vitals and nursing note reviewed. Constitutional:       General: He is not in acute distress. Appearance: He is well-developed. HENT:      Head: Normocephalic and atraumatic. Eyes:      Extraocular Movements: Extraocular movements intact. Conjunctiva/sclera: Conjunctivae normal.      Pupils: Pupils are equal, round, and reactive to light. Neck:      Trachea: No tracheal deviation. Cardiovascular:      Rate and Rhythm: Tachycardia present. Rhythm irregular. Pulses: Normal pulses. Pulmonary:      Effort: Pulmonary effort is normal.      Breath sounds: Normal breath sounds. No wheezing or rales. Abdominal:      General: There is no distension. Palpations: Abdomen is soft. Tenderness: There is no abdominal tenderness. Musculoskeletal:         General: No deformity. Normal range of motion. Cervical back: Normal range of motion. Skin:     General: Skin is warm and dry. Capillary Refill: Capillary refill takes less than 2 seconds. Neurological:      General: No focal deficit present. Mental Status: He is alert and oriented to person, place, and time. Motor: No weakness. RESULTS     EKG: All EKG's are interpreted by the Emergency Department Physician who either signs or Co-signsthis chart in the absence of a cardiologist.  Patient's EKG shows a irregular narrow complex tachycardic rhythm with noticeable P waves consistent with atrial fibrillation with RVR. Ventricular rate is 173 bpm QTc interval within normal limits. . Patient has a normal axis.   Patient has ST elevations in the anterior leads as well as aVL and 1 with reciprocal changes in the inferior leads consistent with ST elevation MI.     RADIOLOGY:   Non-plain filmimages such as CT, Ultrasound and MRI are read by the radiologist. Plain radiographic images are visualized and preliminarily interpreted by the emergency physician with the below findings:      Interpretation per the Radiologist below, if available at the time ofthis note:    XR CHEST PORTABLE   Final Result   Unchanged moderate cardiomegaly, central pulmonary vascular congestion and   mild bilateral pleural effusions. XR CHEST PORTABLE   Final Result   1. New enlargement of the cardiac silhouette may reflect cardiomegaly or   pericardial effusion. 2. New right infrahilar opacity with differential diagnosis including   atelectasis, pneumonia, or edema in the setting of congestive heart failure.                ED BEDSIDE ULTRASOUND:   Performed by ED Physician - none    LABS:  Labs Reviewed   CBC WITH AUTO DIFFERENTIAL - Abnormal; Notable for the following components:       Result Value    RBC 3.95 (*)     Hemoglobin 10.6 (*)     Hematocrit 33.1 (*)     Lymphocytes Absolute 0.8 (*)     All other components within normal limits    Narrative:     Performed at:  [place] [address] [address]   Phone [phone]   COMPREHENSIVE METABOLIC PANEL W/ REFLEX TO MG FOR LOW K - Abnormal; Notable for the following components:    Potassium reflex Magnesium 3.4 (*)     Glucose 134 (*)     BUN 24 (*)     CREATININE 1.4 (*)     GFR Non- 49 (*)     GFR African American 59 (*)     Alkaline Phosphatase 168 (*)     ALT 8 (*)     AST 11 (*)     All other components within normal limits    Narrative:     Performed at:  [place] [address] [address]   Phone [phone]   TROPONIN - Abnormal; Notable for the following components:    Troponin 0.36 (*)     All other components within normal limits    Narrative:     Performed at:  [place] [address] [address]   Phone [phone] - Abnormal; Notable for the following components:    Pro-BNP 4,273 (*)     All other components within normal limits Narrative:     Performed at:  [address],  [place], [address]   Phone [phone]   LACTIC ACID, PLASMA - Abnormal; Notable for the following components:    Lactic Acid 2.7 (*)     All other components within normal limits    Narrative:     Performed at:  [address],  [place], [address]   Phone [phone]   BLOOD GAS, VENOUS - Abnormal; Notable for the following components:    pCO2, Martin 51.1 (*)     pO2, Martin 58.5 (*)     HCO3, Venous 31.9 (*)     Base Excess, Martin 6.2 (*)     Carboxyhemoglobin 3.7 (*)     All other components within normal limits    Narrative:     Performed at:  [address],  [place], [address]   Phone [phone], RAPID   LIPASE    Narrative:     Performed at:  [address],  [place], [address]   Phone [phone]   PROTIME-INR    Narrative:     Performed at:  [address],  [place], [address]   Phone [phone]   APTT    Narrative:     Performed at:  [address],  [place], [address]   Phone [phone]   ETHANOL    Narrative:     Performed at:  [address],  [place], [address]   Phone [phone]   MAGNESIUM    Narrative:     Performed at:  [address],  [place], [address]   Phone [phone]   URINE RT REFLEX TO CULTURE   CBC   APTT   APTT   APTT       All other labs were within normal range or not returned as of this dictation.     EMERGENCY DEPARTMENT COURSE and DIFFERENTIAL DIAGNOSIS/MDM:   Vitals:    Vitals:    09/11/21 0059 09/11/21 0127 09/11/21 0132 09/11/21 0142   BP: 108/71 94/60 109/66 95/72   Pulse: 168 133 163 150   Resp: 18 19 17 21   Temp: 98.2 °F (36.8 °C)      TempSrc: Axillary      SpO2: 95% 93%  96%   Weight: 167 lb (75.8 kg)      Height: 5' 7\" (1.702 m)          Patient was given thefollowing medications:  Medications   heparin (porcine) injection 4,000 Units (has no administration in time range)   heparin (porcine) injection 2,000 Units (has no administration in time range)   heparin 25,000 units in dextrose 5% 250 mL (premix) infusion (910 Units/hr IntraVENous New Bag 9/11/21 0123)   dilTIAZem injection 10 mg (10 mg IntraVENous Given 9/11/21 0115)     Followed by   dilTIAZem 125 mg in dextrose 5 % 125 mL infusion (has no administration in time range)   phenylephrine (PEREZ-SYNEPHRINE) 50 mg in dextrose 5 % 250 mL infusion (has no administration in time range)   aspirin suppository 300 mg (300 mg Rectal Not Given 9/11/21 0118)   fentaNYL (SUBLIMAZE) injection 50 mcg (has no administration in time range)   dilTIAZem 25 MG/5ML injection (has no administration in time range)   heparin (porcine) injection 4,000 Units (4,000 Units IntraVENous Given 9/11/21 0121)   ticagrelor (BRILINTA) tablet 180 mg (180 mg Oral Given 9/11/21 0107)       ED COURSE & MEDICAL DECISION MAKING    Pertinent Labs & Imaging studies reviewed. (See chart for details)   -  Patient seen and evaluated in the emergency department. -  Triage and nursing notes reviewed and incorporated. -  Old chart records reviewed and incorporated. -  Differential diagnosis includes: Differential Diagnosis: Acute Coronary Syndrome, Congestive Heart Failure, Thoracic Dissection, Pericarditis, Pericardial Effusion, Pulmonary Embolism, Pneumonia, Pneumothorax, Ischemic Bowel, Bowel Obstruction, PUD, GERD, Acute Cholecystitis, Pancreatitis, Hepatitis, Colitis, other    -  Work-up included:  See above  -  ED treatment included: See above  -  Results discussed with patient. Patient resents the ED with an EKG that appears to show STEMI in the anterior leads and high lateral leads. There is reciprocity. Patient borderline hypotensive on arrival.  Cardiology consulted and recommended start the patient on Cardizem with bolus and drip. Due to the patient's low blood pressures I did offer to place a central line so the patient could be placed on pressors if necessary. Patient did consent to placement of central line and was placed in the left IJ. Labs show elevated lactate and elevated troponin. Patient renal function elevated as well. . Imaging studies show previous placed left IJ. Patient feels well at time of transfer to the Cath Lab. The patient is agreeable with plan of care and disposition. REASSESSMENT          CRITICAL CARE TIME   Total Critical Care time was 33 minutes, excluding separately reportable procedures. There was a high probability of clinically significant/life threatening deterioration in the patient's condition which required my urgent intervention. CONSULTS:  None    PROCEDURES:  Unless otherwise noted below, none     Central Line    Date/Time: 9/11/2021 2:21 AM  Performed by: Magda Malin MD  Authorized by: Magda Malin MD     Consent:     Consent obtained:  Verbal    Consent given by:  Patient    Risks discussed:  Arterial puncture, incorrect placement, infection, bleeding and pneumothorax    Alternatives discussed:  No treatment and delayed treatment  Pre-procedure details:     Hand hygiene: Hand hygiene performed prior to insertion      Sterile barrier technique: All elements of maximal sterile technique followed      Skin preparation:  2% chlorhexidine    Skin preparation agent: Skin preparation agent completely dried prior to procedure    Anesthesia (see MAR for exact dosages):      Anesthesia method:  Local infiltration    Local anesthetic:  Lidocaine 1% WITH epi  Procedure details:     Location:  L internal jugular    Site selection rationale:  Habitus    Patient position:  Flat    Procedural supplies:  Triple lumen    Catheter size:  7 Fr    Landmarks identified: yes Ultrasound guidance: yes      Sterile ultrasound techniques: Sterile gel and sterile probe covers were used      Number of attempts:  1    Successful placement: yes    Post-procedure details:     Post-procedure:  Dressing applied and line sutured    Assessment:  Blood return through all ports, free fluid flow, no pneumothorax on x-ray and placement verified by x-ray    Patient tolerance of procedure: Tolerated well, no immediate complications        FINAL IMPRESSION      1. Chest pain, unspecified type    2. Atrial fibrillation with RVR ([address]    3. Lactic acidosis          DISPOSITION/PLAN   DISPOSITION        PATIENT REFERREDTO:  No follow-up provider specified.     DISCHARGEMEDICATIONS:  New Prescriptions    No medications on file          (Please note that portions of this note were completed with a voice recognition program.  Efforts were made to edit the dictations but occasionally words are mis-transcribed.)    Alina Guzman MD (electronically signed)  Attending Emergency Physician          Alina Guzman MD  09/11/21 6228

## 2021-09-11 NOTE — PROGRESS NOTES
PT admitted from cath lab with impella in place. Pt on heparin, amiodarone, kolton-synephrine, and Integrilin. Kolton-synephrine successfully weaned off. Impella set to P-9, all waveforms appropriate. Pedal and post-tibial pulses were unable to be found using doppler (an expected finding per cardiology RN hand-off), popliteal pulse found with doppler. Due to pt incontinence jefferson catheter placed. Increased APTT results resulted in the discontinuation of systemic heparin, with impella purge solution converted to protocol heparin. Heparin will be restarted once pt re-enters therapeutic range.

## 2021-09-11 NOTE — CONSULTS
[address] [facility]  [phone]      Attending Physician: Rodolfo Herrera MD  Reason for Consultation/Chief Complaint: Throat pain    Subjective   History of Present Illness:  Yasmin Ansari is a 66 y.o. patient who presented to the hospital with complaints of throat pain, patient began developed throat pain this evening, he called his son who called 46 and he was brought to emergency room, and in route, he was noted be tachycardic with an irregular rhythm with heart rates between 150 and 200 bpm.  When he presented emergency room, he was found to be in atrial fibrillation with rapid ventricular spots, there is ST elevation likely in the lateral leads as well as septal leads, there is diffuse ST depression for which a code STEMI alert was initiated. Patient also noted shortness of breath and increasing lower extremity swelling. History is not obtainable from patient as he is in distress, some history is obtained from patient's son who is at the bedside indicates that patient is nonambulatory due to prior issues with his hip. He also has a difficult social situation, is father helps to care for a son who has a heroin addiction. Per son, patient has not had a history of drug abuse. He does note that he has had past history of coronary stenting, from review of records, appears to had an LAD stent about 20 years ago he is also had peripheral vascular disease with lower extremity stenting. He also is known to be chronically diabetic. He is out of his take his medications. In course evaluation emergency room, troponin levels were found to be elevated at 0.36, proBNP level was 4273. Patient has a longstanding history of cardiac disease, has had stenting as noted above, he follows with [facility] cardiology although has been lost to follow-up over the last 2 or 3 years per his son.   Does have history ischemic heart myopathy with ejection fraction previously of 20 to 25% although follow-up echocardiogram in 2017 showed ejection fraction 40 to 45%. Previously ICD has been discussed with patient however he has declined to have that. Past Medical History:   has a past medical history of Arthritis, CAD (coronary artery disease), COPD (chronic obstructive pulmonary disease) ([facility] Diabetes mellitus ([facility] Hyperlipidemia, and Hypertension. Surgical History:   has a past surgical history that includes Coronary angioplasty with stent; femoral bypass (Bilateral); Upper gastrointestinal endoscopy (N/A, 2/19/2021); other surgical history; and Femur fracture surgery (Left, 3/5/2021). Social History:   reports that he has been smoking cigarettes. He has a 61.00 pack-year smoking history. He has never used smokeless tobacco. He reports that he does not drink alcohol and does not use drugs. Home Medications:  Were reviewed and are listed in nursing record and/or below  Prior to Admission medications    Medication Sig Start Date End Date Taking? Authorizing Provider   lidocaine (LIDODERM) 5 % Place 1 patch onto the skin daily 12 hours on, 12 hours off. 8/14/21   GAMAL Benites   tiotropium (SPIRIVA RESPIMAT) 2.5 MCG/ACT AERS inhaler Inhale 2 puffs into the lungs daily 5/24/21   Tiff Arzola MD   [facility] [place]) MISC 1 Device by Does not apply route as needed (Status post  hip surgery) 3/25/21   Rahul Boykin MD   potassium chloride (KLOR-CON M) 20 MEQ extended release tablet Take 1 tablet by mouth daily for 7 days 3/9/21 3/16/21  Celsa Mccrary MD   enoxaparin (LOVENOX) 40 MG/0.4ML injection Inject 0.4 mLs into the skin daily 3/7/21   GAMAL Leonard   albuterol sulfate HFA (PROAIR HFA) 108 (90 Base) MCG/ACT inhaler Inhale 2 puffs into the lungs every 6 hours as needed for Wheezing 4/9/18   Roxy Mary MD   acetaminophen (TYLENOL) 325 MG tablet Take 2 tablets by mouth every 4 hours as needed for Pain or Fever Maximum dose of acetaminophen is 4000 mg from all sources in 24 hours. 10/3/17   Seema Gallegos MD   ipratropium-albuterol (DUONEB) 0.5-2.5 (3) MG/3ML SOLN nebulizer solution Inhale 3 mLs into the lungs 3 times daily 10/3/17   Seema Gallegos MD   mometasone-formoterol [facility]) 200-5 MCG/ACT inhaler Inhale 2 puffs into the lungs 2 times daily Substituted for Budesonide-Formoterol (SYMBICORT).  10/3/17   Seema Gallegos MD   atorvastatin (LIPITOR) 80 MG tablet Take 1 tablet by mouth daily 10/3/17   Seema Gallegos MD   amLODIPine (NORVASC) 10 MG tablet Take 0.5 tablets by mouth nightly Note: new lower dose 10/3/17   Seema Gallegos MD   furosemide (LASIX) 40 MG tablet Take 1 tablet by mouth daily 10/3/17   Seema Gallegos MD   [facility]) 60 MG CP24 extended release capsule Take 1 capsule by mouth daily 10/3/17   Seema Gallegos MD   insulin glargine (LANTUS SOLOSTAR) 100 UNIT/ML injection pen Inject 20 Units into the skin nightly 10/3/17   Seema Gallegos MD   insulin lispro (HUMALOG KWIKPEN) 100 UNIT/ML pen Inject 0-6 Units into the skin 3 times daily (before meals) Low Dose Correction Algorithm: BS  No Insulin, -199 1 Unit, 200-249 2 Units, 250-299 3 Units, 300-349 4 Units, 350-399 5 Units, 400 and above give 6 Units 10/3/17   Seema Gallegos MD   carvedilol (COREG) 6.25 MG tablet Take 6.25 mg by mouth 2 times daily    Historical Provider, MD   aspirin 81 MG chewable tablet Take 81 mg by mouth daily    Historical Provider, MD   lisinopril (PRINIVIL;ZESTRIL) 40 MG tablet Take 40 mg by mouth daily    Historical Provider, MD   pantoprazole sodium (PROTONIX) 40 MG PACK packet Take 40 mg by mouth every morning (before breakfast)    Historical Provider, MD   SITagliptin-MetFORMIN HCl ER (JANUMET XR) 100-1000 MG TB24 Take 1 tablet by mouth daily    Historical Provider, MD   albuterol sulfate HFA (PROVENTIL HFA) 108 (90 BASE) MCG/ACT inhaler Inhale 2 puffs into the lungs every 4 hours as needed for Wheezing or Shortness of Breath With spacer (and mask if indicated). Thanks. 12/24/16 3/29/17  Jean Claude Burns MD   clopidogrel (PLAVIX) 75 MG tablet Take 75 mg by mouth daily    Historical Provider, MD        CURRENT Medications:  heparin (porcine) injection 4,000 Units, PRN  heparin (porcine) injection 2,000 Units, PRN  heparin 25,000 units in dextrose 5% 250 mL (premix) infusion, Continuous  dilTIAZem 125 mg in dextrose 5 % 125 mL infusion, Continuous  phenylephrine (PEREZ-SYNEPHRINE) 50 mg in dextrose 5 % 250 mL infusion, Once  aspirin suppository 300 mg, Once  fentaNYL (SUBLIMAZE) injection 50 mcg, Once  dilTIAZem 25 MG/5ML injection,         Allergies:  No known allergies     Review of Systems:   Unable to obtain from patient as he is in distress      Objective   PHYSICAL EXAM:    Vitals:    09/11/21 0142   BP: 95/72   Pulse: 150   Resp: 21   Temp:    SpO2: 96%    Weight: 167 lb (75.8 kg)         General Appearance:    In distress, appears older than stated age   Throat: Lips, mucosa, and tongue dry   Neck: Supple, no JVP   Lungs:    Bilateral wheezing, respirations labored   Chest Wall:  No deformity or tenderness   Heart:   Irregular rate and rhythm, S1, S2 normal, tachycardic, 2 out of 6 systolic murmur, positive S3   Abdomen:   Soft,tender   Extremities: Extremities +2 to +3 bilateral lower extremity swelling   Pulses: 2+ and symmetric in upper extremities   Skin: Skin color pale/cool   Pysch:  Anxious mood and affect   Neurologic: Unable to obtain from patient as he is in distress        Labs   CBC:   Lab Results   Component Value Date    WBC 6.2 09/11/2021    RBC 3.95 09/11/2021    HGB 10.6 09/11/2021    HCT 33.1 09/11/2021    MCV 83.6 09/11/2021    RDW 14.9 09/11/2021     09/11/2021     CMP:  Lab Results   Component Value Date     09/11/2021    K 3.4 09/11/2021    CL 99 09/11/2021    CO2 31 09/11/2021    BUN 24 09/11/2021    CREATININE 1.4 09/11/2021    GFRAA 59 09/11/2021    AGRATIO 1.1 09/11/2021    LABGLOM 49 09/11/2021    GLUCOSE 134 09/11/2021 PROT 6.5 09/11/2021    CALCIUM 8.9 09/11/2021    BILITOT 0.4 09/11/2021    ALKPHOS 168 09/11/2021    AST 11 09/11/2021    ALT 8 09/11/2021     PT/INR:  No results found for: PTINR  HgBA1c:  Lab Results   Component Value Date    LABA1C 5.9 02/19/2021     Lab Results   Component Value Date    CKTOTAL 102 08/14/2021    TROPONINI 0.36 (H) 09/11/2021         Cardiac Data     Last EKG: As in HPI, atrial fibrillation rapid ventricular response, there is diffuse ST depression, there is ST elevation noted in septal and lateral leads, this is more prominent than in prior EKGs    Echo:    2017 at [facility]:    Overall left ventricular ejection fraction is estimated to be 45-50%. There is mild concentric left ventricular hypertrophy. The inferoseptum wall is moderately hypokinetic. The diastolic function is impaired and classified as Grade 1   (impaired relaxation). The study was technically difficult. Stress Test:    Cath:    Component 20 yr ago   Cath Lab Test  Cath (EF.48, successful PTA of the left external illias artery. successful stent placemnet, exernal illiac artry.)          Studies:     cxr    Impression   1. New enlargement of the cardiac silhouette may reflect cardiomegaly or   pericardial effusion. 2. New right infrahilar opacity with differential diagnosis including   atelectasis, pneumonia, or edema in the setting of congestive heart failure. I have reviewed labs and imaging/xray/diagnostic testing in this note.     Assessment and Plan          Patient Active Problem List   Diagnosis    Arthritis    Diabetes mellitus ([address]    Hypertension    Hyperlipidemia    Ischemic stroke ([address]    Acute on chronic combined systolic and diastolic congestive heart failure (HCC)    COPD (chronic obstructive pulmonary disease) (HCC)    Tobacco use    GI bleeding    Pulmonary nodule    CAD (coronary artery disease)    Closed nondisplaced intertrochanteric fracture of left femur ([address]    Hypokalemia    Leukocytosis    Gastroesophageal reflux disease       Acute septal/lateral STEMI, also in the setting of atrial fibrillation with rapid ventricular response, plan on emergency heart catheterization, discussed with patient and son who is at the bedside, they understand this is an emergency procedure and wish to proceed in this manner. Critical care time 31-minutes    Thank you for allowing us to participate in the care of Ok Stone. Please call me with any questions [phone].     Solis Ng MD, [address] [facility]   Interventional Cardiologist  [facility]  [phone] [facility]  [phone] [address] [place] [address]  9/11/2021 1:51 AM

## 2021-09-11 NOTE — CONSULTS
Consultation H&P    Date of Admission:  9/11/2021 12:55 AM  Date of Consultation:  9/11/2021    PCP:  Starla Stone      Cards: iMchelle Victorianox: DAMARIS Delgado [address]    Chief Complaint:  CAD    History of Present Illness: We are asked to see this patient in consultation by Dr. Janice Logan regarding CAD  Mari Soriano is a 66 y.o. male smoker with hx COPD, DM, PVD, mobility issues/walker. Hx CAD, AWMI 2001 PCI LAD BMS, stent thrombosis, repeat PCI; CMP, EF 25, declined ICD. Seen in ED 8/14/21 after falling, back pain. covid neg. On ASA, lovenox (started 3/2021 after ORIF L femur), plavix, BB, statin, CCB, ACE. dc'ed on abx for E. Faecalis UTI. Presented to ED 9/11/21 1am - throat pain, dyspnea. AF 170s, STEMI. hgb 10.6, Cr 1.4, trop 0.36, probnp 4273  Dilt, hep. Brilinta loaded (01:07). Cath 3VD,  LAD, culprit LCx. EF 15, ant wall AK. Impella placed. Dig. [place] 150, amio. bp 90s.        Past Medical History:  Past Medical History:   Diagnosis Date    Arthritis     CAD (coronary artery disease)     2001 AWMI, PCI LAD;    Cholelithiasis 2017    COPD (chronic obstructive pulmonary disease) ([address]     CVA (cerebral vascular accident) ([address] 2017    acute right thalamic and left central yue Ischemic stroke    Diabetes mellitus ([address]     Esophageal stricture     dilation    Falls     3/2017, 5/2017, 9/2020, 3/2021, 8/2021    GERD (gastroesophageal reflux disease)     GI bleed 02/2021    hematemesis    Hiatal hernia     EGD 2/2021 3-5cm    Hyperlipidemia     Hypertension     Nephrolithiasis 2017    bilat    Nodule of kidney     right, 1.2cm    Parotid nodule 2021    right, 1.6cm SUV 44; L 1.1cm SUV 11.2    Pulmonary nodule 2021    RUL 7mm, SUV 1.7    PVD (peripheral vascular disease) ([address]     Rib fractures 2017    R posterior 11, 12    Vertigo        Past Surgical History:  Past Surgical History:   Procedure Laterality Date    CATARACT REMOVAL Bilateral 2010    CORONARY ANGIOPLASTY WITH STENT PLACEMENT 2001    SHAHEEN LAD    ESOPHAGEAL DILATATION  2016    FEMORAL BYPASS Bilateral     FEMUR FRACTURE SURGERY Left 03/05/2021    LEFT FEMUR INTRAMEDULLARY NAIL GRAZYNA INSERTION performed by Jovi Brooks MD at [facility]      LEFT FEMUR INTRAMEDULLARY NAIL GRAZYNA INSERTION (Left Hip    UPPER GASTROINTESTINAL ENDOSCOPY N/A 02/19/2021    EGD CONTROL HEMORRHAGE performed by Isabell Rosales MD at [address] [place] [address] Medications:   Prior to Admission medications    Medication Sig Start Date End Date Taking? Authorizing Provider   lidocaine (LIDODERM) 5 % Place 1 patch onto the skin daily 12 hours on, 12 hours off. 8/14/21   Julius Goldmann Sikes, PA   tiotropium (SPIRIVA RESPIMAT) 2.5 MCG/ACT AERS inhaler Inhale 2 puffs into the lungs daily 5/24/21   Chloe Wilson MD   [facility]. Devices ([facility]) MISC 1 Device by Does not apply route as needed (Status post  hip surgery) 3/25/21   Jovi Brooks MD   potassium chloride (KLOR-CON M) 20 MEQ extended release tablet Take 1 tablet by mouth daily for 7 days 3/9/21 3/16/21  Jose Juan Hernandez MD   enoxaparin (LOVENOX) 40 MG/0.4ML injection Inject 0.4 mLs into the skin daily 3/7/21   GAMAL Ocasio   albuterol sulfate HFA (PROAIR HFA) 108 (90 Base) MCG/ACT inhaler Inhale 2 puffs into the lungs every 6 hours as needed for Wheezing 4/9/18   Ihsan Gonzalez MD   acetaminophen (TYLENOL) 325 MG tablet Take 2 tablets by mouth every 4 hours as needed for Pain or Fever Maximum dose of acetaminophen is 4000 mg from all sources in 24 hours. 10/3/17   Brendan Heath MD   ipratropium-albuterol (DUONEB) 0.5-2.5 (3) MG/3ML SOLN nebulizer solution Inhale 3 mLs into the lungs 3 times daily 10/3/17   Brendan Heath MD   mometasone-formoterol [facility]) 200-5 MCG/ACT inhaler Inhale 2 puffs into the lungs 2 times daily Substituted for Budesonide-Formoterol (SYMBICORT).  10/3/17   Brendan Heath MD   atorvastatin (LIPITOR) 80 MG tablet Take 1 tablet by mouth daily 10/3/17   Prince Mclean MD   amLODIPine (NORVASC) 10 MG tablet Take 0.5 tablets by mouth nightly Note: new lower dose 10/3/17   Prince Mclean MD   furosemide (LASIX) 40 MG tablet Take 1 tablet by mouth daily 10/3/17   Prince Mclean MD   [facility]) 60 MG CP24 extended release capsule Take 1 capsule by mouth daily 10/3/17   Prince Mclean MD   insulin glargine (LANTUS SOLOSTAR) 100 UNIT/ML injection pen Inject 20 Units into the skin nightly 10/3/17   Prince Mclean MD   insulin lispro (HUMALOG KWIKPEN) 100 UNIT/ML pen Inject 0-6 Units into the skin 3 times daily (before meals) Low Dose Correction Algorithm: BS  No Insulin, -199 1 Unit, 200-249 2 Units, 250-299 3 Units, 300-349 4 Units, 350-399 5 Units, 400 and above give 6 Units 10/3/17   Prince Mclean MD   carvedilol (COREG) 6.25 MG tablet Take 6.25 mg by mouth 2 times daily    Historical Provider, MD   aspirin 81 MG chewable tablet Take 81 mg by mouth daily    Historical Provider, MD   lisinopril (PRINIVIL;ZESTRIL) 40 MG tablet Take 40 mg by mouth daily    Historical Provider, MD   pantoprazole sodium (PROTONIX) 40 MG PACK packet Take 40 mg by mouth every morning (before breakfast)    Historical Provider, MD   SITagliptin-MetFORMIN HCl ER (JANUMET XR) 100-1000 MG TB24 Take 1 tablet by mouth daily    Historical Provider, MD   albuterol sulfate HFA (PROVENTIL HFA) 108 (90 BASE) MCG/ACT inhaler Inhale 2 puffs into the lungs every 4 hours as needed for Wheezing or Shortness of Breath With spacer (and mask if indicated). Thanks. 12/24/16 3/29/17  Yamila Taylor MD   clopidogrel (PLAVIX) 75 MG tablet Take 75 mg by mouth daily    Historical Provider, MD        Facility Administered Medications:    fentanNYL  50 mcg IntraMUSCular Once    dilTIAZem        pantoprazole  40 mg IntraVENous Daily    budesonide-formoterol  2 puff Inhalation BID    tiotropium  2 puff Inhalation Daily    aspirin  81 mg Oral Daily    ticagrelor  90 mg Oral BID    carvedilol  3.125 mg Oral BID     lisinopril  10 mg Oral BID    atorvastatin  80 mg Oral Nightly    amiodarone  400 mg Oral BID    potassium chloride  20 mEq Oral Daily       Allergies: Allergies   Allergen Reactions    No Known Allergies         Social History:    Lifestyle: lives alone. Cares for son with addiction. Social History     Socioeconomic History    Marital status:      Spouse name: Not on file    Number of children: Not on file    Years of education: Not on file    Highest education level: Not on file   Occupational History    Not on file   Tobacco Use    Smoking status: Current Every Day Smoker     Packs/day: 1.00     Years: 61.00     Pack years: 61.00     Types: Cigarettes    Smokeless tobacco: Never Used    Tobacco comment: 1 pack a day   Vaping Use    Vaping Use: Never used   Substance and Sexual Activity    Alcohol use: No    Drug use: No    Sexual activity: Not Currently   Other Topics Concern    Not on file   Social History Narrative    Not on file     Social Determinants of Health     Financial Resource Strain:     Difficulty of Paying Living Expenses:    Food Insecurity:     Worried About Running Out of Food in the Last Year:     [address] in the Last Year:    Transportation Needs:     Lack of Transportation (Medical):  Lack of Transportation (Non-Medical):    Physical Activity:     Days of Exercise per Week:     Minutes of Exercise per Session:    Stress:     Feeling of Stress :    Social Connections:     Frequency of Communication with Friends and Family:     Frequency of Social Gatherings with Friends and Family:     Attends Confucianism Services:     Active Member of Clubs or Organizations:     Attends Club or Organization Meetings:     Marital Status:    Intimate Partner Violence:     Fear of Current or Ex-Partner:     Emotionally Abused:     Physically Abused:     Sexually Abused:        Family History:  History reviewed.  No pertinent family history. Review of Systems:  Reviewed, negative unless noted  Constitutional: weight change, weakness, impairment of ADLs  Eyes: eyestrain, redness, discharges  ENMT: post nasal drip, sinus pain, discharge   Cardiovascular: faintness, vertigo, color changes in fingers/toes  Respiratory: cough, sputum, hemoptysis  GI: excessive thirst, changes in bowel habits, abdominal swelling  : painful urination, pyuria, incontinence  Musculoskeletal: cramps, swelling, limitation of motor activity  Integumentary: cyanosis, changes in skin, dryness  Neurological: paralysis, tingling, tremors  Psychiatric: restlessness, irritability, mood swings  Endocrine: heat/cold intolerance, excessive sweating, hair loss  Hematologic/lymphatic: swollen glands, anemia, easy bruising/bleeding      Physical Examination:    BP (!) 163/109   Pulse 62   Temp 98.3 °F (36.8 °C) (Bladder)   Resp (!) 31   Ht 5' 7\" (1.702 m)   Wt 167 lb (75.8 kg)   SpO2 98%   BMI 26.16 kg/m²      Admission Weight: 167 lb (75.8 kg)   General appearance: NAD  Eyes: anicteric  ENMT: no scars or lesions,poor dentition  Neck: no JVD. Respiratory: effort slightly labored, symmetric  Cardiovascular: regular. Impella L fem. GI: abdomen soft  Extremities: LLE cool, somewhat mottled. +dopp pop. Neuro: grossly intact. MEDICAL DECISION MAKING/TESTING  Studies personally reviewed. SPECT: 2/4/09  EF 29, Abnormal perfusion scan demonstrating large fixed defects consistent with prior areas of infact in the LAD territory. LV enlargement with severe LV dysfunction. Cath:   2001 BMS LAD. Stent thrombosis.  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 Descending aorta has aneurysmal segments, there are stents noted in bilateral iliac arteries with severe disease on the right side and moderate disease in the left side.            CORONARY ARTERIES     LM Calcified, proximal-mid 20% stenosis, distal less than 10% stenosis.       LAD Calcified, there is a proximal-mid stent with 100% svenrkir-vsw-diojxc occlusion (probable) , there are right to left collaterals noted. There are also left to left collaterals noted.         LCX Calcified, ostial/proximal 40 to 50% stenosis followed by mid 99% stenosis that extends into OM 2.     OM 1 is a large OM with proximal-mid 60 to 70% stenosis. OM 2 has 20% dmljdlhc-czj-tyoocs stenosis.       RCA Dominant, heavily calcified, proximal-mid 70 to 80% stenosis followed by distal 50 to 60% stenosis.     PLV has 80% yuxunjot-ygu-geocgw stenosis. PLV has 20 to 30% mgfctoto-ojx-ilgxvy stenosis.        Echo:   9/18/08 EF.45, Mild Global Hypo, Mild LAE, mildly dilated LV dilatation    1/24/11 Left ventricle: The cavity size was normal. Wall thickness was     normal. Systolic function was severely reduced. The estimated     ejection fraction was in the range of 25% to 30%. Diffuse     hypokinesis. Regional wall motion abnormalities cannot be     excluded. Doppler parameters are consistent with abnormal left     ventricular relaxation (grade 1 diastolic dysfunction). Aortic     valve: Thickening, consistent with sclerosis. Left atrium: The     atrium was mildly dilated. 2/21/17 Overall left ventricular ejection fraction is estimated to be 30-35%. The left ventricular function is moderately reduced. There is moderate global hypokinesis of the left ventricle. There is trace mitral regurgitation. The left atrium is dilated. The diastolic function is impaired and classified as Grade 1     9/23/17Overall left ventricular ejection fraction is estimated to be 45-50%.    There is mild concentric left ventricular anteriorly and posteriorly in the left   hemithorax. .  No hypermetabolic pulmonary nodule on the left.       ABDOMEN/PELVIS: There is mild thickening and hypodensity seen in the adrenal   glands, either due to adenoma or hyperplasia. .  No hypermetabolic adrenal mass       Gallstones are seen       No hydronephrosis on the right or left       Stones are seen in the left kidney, measuring 9 mm in size or less       Stones are seen in the right kidney measuring 2 mm in size or less       Mildly hyperdense nodule projects off the right kidney, measuring 1.2 cm, not   clearly a simple cyst       .       No significant small bowel distention noted.  Moderate stool seen in the   colon.  Iliac artery stents are seen.  Atherosclerotic change seen in   abdominal aorta.  Orthopedic hardware seen in the proximal left femur       Spurring is seen in the spine.  Spurring is seen in the hips. . Sarah Porteous is   seen in the shoulder joints.         Ba swallow: 1/3/18  Oral phase of swallowing was grossly within normal limits.       There was penetration and aspiration with thin liquids.  Otherwise,   substances were swallowed without difficulty. Carotid duplex:  9/26/17  <50% bilat    PFTs: 3/7/17  Spirometry shows a severe reduction of the FVC of 2.1 liters with a severe   reduction in the FEV-1 of 0.9 liters. The FEV-1/FVC ratio is severely reduced.  There is   no significant improvement after the administration of bronchodilators. Lung volumes   suggest associated restrictive defect. Labs:   CBC:   Recent Labs     09/11/21  0100 09/11/21  0100 09/11/21  0211 09/11/21  0504 09/11/21  1013   WBC 6.2  --   --  9.2 7.2   HGB 10.6*   < > 9.2* 9.2* 9.3*   HCT 33.1*  --   --  28.4* 28.5*   MCV 83.6  --   --  82.0 83.0     --   --  200 160    < > = values in this interval not displayed.      BMP:   Recent Labs     09/11/21  0100 09/11/21  0211 09/11/21  1013     --  139   K 3.4*  --  3.2*   CL 99  --  102   CO2 31 --  28   BUN 24*  --  29*   CREATININE 1.4* 1.7* 1.4*   CALCIUM 8.9  --  8.3   MG 2.00  --  1.90     Cardiac Enzymes:   Recent Labs     09/11/21  0100   TROPONINI 0.36*     PT/INR:   Recent Labs     09/11/21  0100   PROTIME 11.2   INR 0.99     APTT:   Recent Labs     09/11/21  0100 09/11/21  0504 09/11/21  0833   APTT 34.0 >248.0* 34.3     Liver Profile:  Lab Results   Component Value Date    AST 11 09/11/2021    ALT 8 09/11/2021    BILITOT 0.4 09/11/2021    ALKPHOS 168 09/11/2021    LABALBU 3.4 09/11/2021     No results found for: CHOL, HDL, TRIG  HgbA1c:  Lab Results   Component Value Date    LABA1C 5.9 02/19/2021     UA:   Lab Results   Component Value Date    COLORU Yellow 08/14/2021    PHUR 6.0 08/14/2021    WBCUA 3-5 08/14/2021    RBCUA None seen 08/14/2021    MUCUS 1+ 10/01/2017    BACTERIA 3+ 08/14/2021    CLARITYU Clear 08/14/2021    SPECGRAV 1.025 08/14/2021    LEUKOCYTESUR Negative 08/14/2021    UROBILINOGEN 1.0 08/14/2021    BILIRUBINUR Negative 08/14/2021    BLOODU Negative 08/14/2021    GLUCOSEU Negative 08/14/2021    AMORPHOUS 1+ 10/01/2017       History obtained: chart, pt    Risk factors: DM, HTN, HLD, smoking    Diagnosis: CAD    Plan:   - CAD  Symptomatic. STEMI. Culprit LCx. LV dysfunction, EF 15. AWMI 2001, EF 20s, large ant wall fixed defect documented 2009, last EF 45 by echo 2017. Impella. 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 I do not think pt is a surgical candidate for cabg. Ant wall is scar therefore revasc of LAD would be without benefit. RCA, LCx disease maybe could be approached by PCI. Even that would be risky. Uncertain how much myocardial recovery there would be. LLE ischemia. Only option would be to remove Impella. Not in a position to undergo further procedures percutaneous or otherwise at this juncture in my opinion.   Pt notes increasing dyspnea, doesn't think he'll make it to tomorrow. Discussed with son at bedside, he is POA. Pt does not want intubation or CPR. The situation appears insuperable.       Trevon Cox MD  9/11/2021  1:33 PM

## 2021-09-11 NOTE — CONSULTS
Vascular Surgery Consultation    Patrizia Rodriguez  [id number]  9/11/2021  1945    Date of Admission:  9/11/2021 12:55 AM  Date of Consultation:  9/11/2021    PCP:  Mayuri Tirado       Chief Complaint: Leg ischemia    History of Present Illness: We are asked to see this patient in consultation by Dr. Kinga Todd regarding possible leg ischemia. Patrizia Rodriguez is a 66 y.o. male who presents emerged department yesterday with throat discomfort associated with shortness of breath and increased leg swelling. He had a prior history of PCI with LAD stenting 20 years ago. Patient was found to have STEMI and taken emergently to the cardiac Cath Lab. He was found to have severe reduction of LV function with EF of 15% and severe global hypokinesis. Multivessel coronary disease and an LV assist device was placed to temporize him. There was difficulty placing the assist device and patient noted to have decreased pulses in the foot and therefore we were consulted. Son and daughter at the bedside indicate he had previous stents, though do not know the details. I have found no vascular imaging studies to correlate. There is a CT abdomen pelvis without contrast from 9/23/2017 at [facility] which indicated bilateral external iliac stents as well as distal aortic stent extending into the iliacs. I do not have these images. Patient is on a heparin drip. He denies pain or decrease sensation in the left foot. Per the RN, he has been deemed a noncandidate for CABG and is going to have possible high risk PCI on Monday.      Past Medical History:  Past Medical History:   Diagnosis Date    Arthritis     CAD (coronary artery disease)     2001 AWMI, PCI LAD;    Cholelithiasis 2017    [facility] obstructive [facility] [facility]     CVA (cerebral vascular accident) ([facility] [facility] 2017    acute right thalamic and left central yue Ischemic stroke    Diabetes mellitus ([address]     Esophageal stricture     dilation    Falls 3/2017, 5/2017, 9/2020, 3/2021, 8/2021    GERD (gastroesophageal reflux disease)     GI bleed 02/2021    hematemesis    Hiatal hernia     EGD 2/2021 3-5cm    Hyperlipidemia     Hypertension     Nephrolithiasis 2017    bilat    Nodule of kidney     right, 1.2cm    Parotid nodule 2021    right, 1.6cm SUV 44; L 1.1cm SUV 11.2    Pulmonary nodule 2021    RUL 7mm, SUV 1.7    PVD (peripheral vascular disease) ([address]     Rib fractures 2017    R posterior 11, 12    Vertigo        Past Surgical History:  Past Surgical History:   Procedure Laterality Date    CATARACT REMOVAL Bilateral 2010    CORONARY ANGIOPLASTY WITH STENT PLACEMENT  2001    SHAHEEN LAD    ESOPHAGEAL DILATATION  2016    FEMORAL BYPASS Bilateral     FEMUR FRACTURE SURGERY Left 03/05/2021    LEFT FEMUR INTRAMEDULLARY NAIL GRAZYNA INSERTION performed by Nicolle James MD at [facility] [place] [facility] NAIL GRAZYNA INSERTION (Left Hip    UPPER GASTROINTESTINAL ENDOSCOPY N/A 02/19/2021    EGD CONTROL HEMORRHAGE performed by Eder Herman MD at [address] [place] [address] Medications:   Prior to Admission medications    Medication Sig Start Date End Date Taking? Authorizing Provider   lidocaine (LIDODERM) 5 % Place 1 patch onto the skin daily 12 hours on, 12 hours off. 8/14/21   GAMAL Beaulieu   tiotropium (SPIRIVA RESPIMAT) 2.5 MCG/ACT AERS inhaler Inhale 2 puffs into the lungs daily 5/24/21   Matias Owen MD   [facility] [place] [facility].  Devices ([facility]) MISC 1 Device by Does not apply route as needed (Status post  hip surgery) 3/25/21   Nicolle James MD   potassium chloride (KLOR-CON M) 20 MEQ extended release tablet Take 1 tablet by mouth daily for 7 days 3/9/21 3/16/21  Luc Olivarez MD   enoxaparin (LOVENOX) 40 MG/0.4ML injection Inject 0.4 mLs into the skin daily 3/7/21   GAMAL Feng   albuterol sulfate HFA (PROAIR HFA) 108 (90 Base) MCG/ACT inhaler Inhale 2 puffs into the lungs every 6 hours as needed for Wheezing 4/9/18   Rosina Nunes MD   acetaminophen (TYLENOL) 325 MG tablet Take 2 tablets by mouth every 4 hours as needed for Pain or Fever Maximum dose of acetaminophen is 4000 mg from all sources in 24 hours. 10/3/17   Seema Gallegos MD   ipratropium-albuterol (DUONEB) 0.5-2.5 (3) MG/3ML SOLN nebulizer solution Inhale 3 mLs into the lungs 3 times daily 10/3/17   Seema Gallegos MD   mometasone-formoterol [facility]) 200-5 MCG/ACT inhaler Inhale 2 puffs into the lungs 2 times daily Substituted for Budesonide-Formoterol (SYMBICORT).  10/3/17   Seema Gallegos MD   atorvastatin (LIPITOR) 80 MG tablet Take 1 tablet by mouth daily 10/3/17   Seema Gallegos MD   amLODIPine (NORVASC) 10 MG tablet Take 0.5 tablets by mouth nightly Note: new lower dose 10/3/17   Seema Gallegos MD   furosemide (LASIX) 40 MG tablet Take 1 tablet by mouth daily 10/3/17   Seema Gallegos MD   [facility]) 60 MG CP24 extended release capsule Take 1 capsule by mouth daily 10/3/17   Seema Gallegos MD   insulin glargine (LANTUS SOLOSTAR) 100 UNIT/ML injection pen Inject 20 Units into the skin nightly 10/3/17   Seema Gallegos MD   insulin lispro (HUMALOG KWIKPEN) 100 UNIT/ML pen Inject 0-6 Units into the skin 3 times daily (before meals) Low Dose Correction Algorithm: BS  No Insulin, -199 1 Unit, 200-249 2 Units, 250-299 3 Units, 300-349 4 Units, 350-399 5 Units, 400 and above give 6 Units 10/3/17   Seema Gallegos MD   carvedilol (COREG) 6.25 MG tablet Take 6.25 mg by mouth 2 times daily    Historical Provider, MD   aspirin 81 MG chewable tablet Take 81 mg by mouth daily    Historical Provider, MD   lisinopril (PRINIVIL;ZESTRIL) 40 MG tablet Take 40 mg by mouth daily    Historical Provider, MD   pantoprazole sodium (PROTONIX) 40 MG PACK packet Take 40 mg by mouth every morning (before breakfast)    Historical Provider, MD   SITagliptin-MetFORMIN HCl ER (JANUMET XR) 100-1000 MG TB24 Take 1 tablet by mouth daily Historical Provider, MD   albuterol sulfate HFA (PROVENTIL HFA) 108 (90 BASE) MCG/ACT inhaler Inhale 2 puffs into the lungs every 4 hours as needed for Wheezing or Shortness of Breath With spacer (and mask if indicated). Thanks. 12/24/16 3/29/17  Josselin Pelletier MD   clopidogrel (PLAVIX) 75 MG tablet Take 75 mg by mouth daily    Historical Provider, MD        Facility Administered Medications:    fentanNYL  50 mcg IntraMUSCular Once    pantoprazole  40 mg IntraVENous Daily    budesonide-formoterol  2 puff Inhalation BID    tiotropium  2 puff Inhalation Daily    aspirin  81 mg Oral Daily    ticagrelor  90 mg Oral BID    carvedilol  3.125 mg Oral BID WC    lisinopril  10 mg Oral BID    atorvastatin  80 mg Oral Nightly    amiodarone  400 mg Oral BID    potassium chloride  20 mEq Oral Daily       Allergies:  No known allergies     Social History:      Social History     Socioeconomic History    Marital status:      Spouse name: Not on file    Number of children: Not on file    Years of education: Not on file    Highest education level: Not on file   Occupational History    Not on file   Tobacco Use    Smoking status: Current Every Day Smoker     Packs/day: 1.00     Years: 61.00     Pack years: 61.00     Types: Cigarettes    Smokeless tobacco: Never Used    Tobacco comment: 1 pack a day   Vaping Use    Vaping Use: Never used   Substance and Sexual Activity    Alcohol use: No    Drug use: No    Sexual activity: Not Currently   Other Topics Concern    Not on file   Social History Narrative    Not on file     Social Determinants of Health     Financial Resource Strain:     Difficulty of Paying Living Expenses:    Food Insecurity:     Worried About Running Out of Food in the Last Year:     [address] in the Last Year:    Transportation Needs:     Lack of Transportation (Medical):      Lack of Transportation (Non-Medical):    Physical Activity:     Days of Exercise per Week:     Minutes of Exercise per Session:    Stress:     Feeling of Stress :    Social Connections:     Frequency of Communication with Friends and Family:     Frequency of Social Gatherings with Friends and Family:     Attends [employer] Services:     Active Member of Clubs or Organizations:     Attends Club or Organization Meetings:     Marital Status:    Intimate Partner Violence:     Fear of Current or Ex-Partner:     Emotionally Abused:     Physically Abused:     Sexually Abused:      Review of Systems:  Review of systems not obtained due to patient factors. patient is sedated on ventilator. Physical Examination:    BP (!) 163/118   Pulse 60   Temp 98.3 °F (36.8 °C) (Bladder)   Resp (!) 31   Ht 5' 7\" (1.702 m)   Wt 167 lb (75.8 kg)   SpO2 99%   BMI 26.16 kg/m²        Admission Weight: 167 lb (75.8 kg)     General appearance: Disheveled, thin white male. Somewhat somnolent, but does awaken and answer questions appropriately. Goes right back to sleep. Chronically ill appearing. No acute distress. Eyes: PERRLA  Neck: Supple, positive JVD, carotids 2+. Respiratory: O2 sat 99% on 2 L  Cardiovascular: Impella in place with good waveform and pressure. Extremities: BLE demonstrate 3+ pitting edema, slightly worse than the left. The left foot is significantly colder than the right. He is able to wiggle the toes on both feet fairly evenly. States that he can feel me touching the foot. Denies any pain. The left foot is slightly cyanotic but not mottled. Right foot is significantly warmer. Vascular: Right femoral 2+, right popliteal, DP/PT by Doppler. Left popliteal found by Doppler, left DP and PT are absent. GI: Abdomen soft, non-tender.  BS normal. No masses,  No organomegaly  Labs:   CBC:   Recent Labs     09/11/21  0100 09/11/21  0100 09/11/21  0211 09/11/21  0504 09/11/21  1013   WBC 6.2  --   --  9.2 7.2   HGB 10.6*   < > 9.2* 9.2* 9.3*   HCT 33.1*  --   --  28.4* 28.5*   MCV 83.6  --   -- 82.0 83.0     --   --  200 160    < > = values in this interval not displayed. BMP:   Recent Labs     09/11/21  0100 09/11/21  0211 09/11/21  1013     --  139   K 3.4*  --  3.2*   CL 99  --  102   CO2 31  --  28   BUN 24*  --  29*   CREATININE 1.4* 1.7* 1.4*   CALCIUM 8.9  --  8.3   MG 2.00  --  1.90     Cardiac Enzymes:   Recent Labs     09/11/21  0100   TROPONINI 0.36*     PT/INR:   Recent Labs     09/11/21  0100   PROTIME 11.2   INR 0.99     APTT:   Recent Labs     09/11/21  0100 09/11/21  0504 09/11/21  0833   APTT 34.0 >248.0* 34.3         Diagnosis:    Acute on chronic left leg ischemia, exacerbated by indwelling Impella device  Cardiogenic shock s/p STEMI    Plan:   Discussed at length with the patient and his 2 children present at the bedside. Unable to find pulses in the left foot, which is significantly cooler than the right. However, he is surprisingly relatively asymptomatic in terms of pain or discomfort to the left foot. Unfortunately, he has significant cardiac compromise and I told the family that he he probably would not tolerate removing the Impella and therefore we will just have to monitor the leg at this time. Agree with a heparin drip. Regardless, he would not be a candidate for any arterial intervention of the leg. He apparently is not a candidate for CABG and further discussion is continuing regarding whether or not he will undergo high risk PCI in the next few days. We will continue to follow at this time. No interventions at this time.

## 2021-09-11 NOTE — CONSULTS
Consult Note            Date:9/11/2021        Patient Ada Brooke     Date of Birth:[birth year]     Age:75 y.o. Inpatient consult to Pulmonology  Consult performed by: Dom Tinoco MD  Consult ordered by: David Steinberg MD  Reason for consult: ICU admission, status post myocardial infarction status post Impella placement          Chief Complaint     Chief Complaint   Patient presents with    Chest Pain     called 911 for throat discomfort. EMS gave 324mg of ASA and nitro x 1      ICU admission, status post myocardial infarction status post Impella placement    History Obtained From   patient, electronic medical record    History of Present Illness   History of present illness this is a 38-year-old gentleman with a history of COPD CAD and diabetes and hypertension who comes to the emergency room because of initial throat discomfort however was found to have issues with T wave abnormality on EKG. Patient was found to have atrial fibrillation with RVR. Patient started having issues with throat discomfort and this progressively was worked up. Patient was found to be having ST elevation in the lateral leads and code STEMI was initiated. Patient was taken to the Cath Lab and found to have multiple vessel CAD. Patient was started on heparin and Integrilin drips. And started on vasopressor and inotropic support. Patient was moved to the ICU with an [facility]. CT surgery has been consulted along with vascular surgery as the patient's left leg does have some issues with vascular flow distally.       Past Medical History     Past Medical History:   Diagnosis Date    Arthritis     CAD (coronary artery disease)     2001 AWMI, PCI LAD;    Cholelithiasis 2017    COPD (chronic obstructive pulmonary disease) ([address]     CVA (cerebral vascular accident) ([address] 2017    acute right thalamic and left central yue Ischemic stroke    Diabetes mellitus ([address]     Esophageal stricture     dilation    Falls     3/2017, 5/2017, 9/2020, 3/2021, 8/2021    GERD (gastroesophageal reflux disease)     GI bleed 02/2021    hematemesis    Hiatal hernia     EGD 2/2021 3-5cm    Hyperlipidemia     Hypertension     Nephrolithiasis 2017    bilat    Nodule of kidney     right, 1.2cm    Parotid nodule 2021    right, 1.6cm SUV 44; L 1.1cm SUV 11.2    Pulmonary nodule 2021    RUL 7mm, SUV 1.7    PVD (peripheral vascular disease) ([facility] [address]     Rib fractures 2017    R posterior 11, 12    Vertigo         Past Surgical History     Past Surgical History:   Procedure Laterality Date    CATARACT REMOVAL Bilateral 2010    CORONARY ANGIOPLASTY WITH STENT PLACEMENT  2001    SHAHEEN LAD    ESOPHAGEAL DILATATION  2016    FEMORAL BYPASS Bilateral     FEMUR FRACTURE SURGERY Left 03/05/2021    LEFT FEMUR INTRAMEDULLARY NAIL GRAZYNA INSERTION performed by Elda Hammonds MD at [facility] NAIL GRAZYNA INSERTION (Left Hip    UPPER GASTROINTESTINAL ENDOSCOPY N/A 02/19/2021    EGD CONTROL HEMORRHAGE performed by Victoria Marlow MD at [address]        Medications     Prior to Admission medications    Medication Sig Start Date End Date Taking? Authorizing Provider   lidocaine (LIDODERM) 5 % Place 1 patch onto the skin daily 12 hours on, 12 hours off. 8/14/21   GAMAL Holder   tiotropium (SPIRIVA RESPIMAT) 2.5 MCG/ACT AERS inhaler Inhale 2 puffs into the lungs daily 5/24/21   Henrry Huynh MD   Misc.  Devices ([facility]) MISC 1 Device by Does not apply route as needed (Status post  hip surgery) 3/25/21   Elda Hammonds MD   potassium chloride (KLOR-CON M) 20 MEQ extended release tablet Take 1 tablet by mouth daily for 7 days 3/9/21 3/16/21  Ramses Ruffin MD   enoxaparin (LOVENOX) 40 MG/0.4ML injection Inject 0.4 mLs into the skin daily 3/7/21   GAMAL Johnson   albuterol sulfate HFA (PROAIR HFA) 108 (90 Base) MCG/ACT inhaler Inhale 2 puffs into the lungs every 6 hours as needed for Wheezing 4/9/18   Ramana Torres MD   acetaminophen (TYLENOL) 325 MG tablet Take 2 tablets by mouth every 4 hours as needed for Pain or Fever Maximum dose of acetaminophen is 4000 mg from all sources in 24 hours. 10/3/17   Kirt Dao MD   ipratropium-albuterol (DUONEB) 0.5-2.5 (3) MG/3ML SOLN nebulizer solution Inhale 3 mLs into the lungs 3 times daily 10/3/17   Kirt Dao MD   mometasone-formoterol [facility]) 200-5 MCG/ACT inhaler Inhale 2 puffs into the lungs 2 times daily Substituted for Budesonide-Formoterol (SYMBICORT).  10/3/17   Kirt Dao MD   atorvastatin (LIPITOR) 80 MG tablet Take 1 tablet by mouth daily 10/3/17   Kirt Dao MD   amLODIPine (NORVASC) 10 MG tablet Take 0.5 tablets by mouth nightly Note: new lower dose 10/3/17   Kirt Dao MD   furosemide (LASIX) 40 MG tablet Take 1 tablet by mouth daily 10/3/17   Kirt Dao MD   [facility]) 60 MG CP24 extended release capsule Take 1 capsule by mouth daily 10/3/17   Kirt Dao MD   insulin glargine (LANTUS SOLOSTAR) 100 UNIT/ML injection pen Inject 20 Units into the skin nightly 10/3/17   Kirt Dao MD   insulin lispro (HUMALOG KWIKPEN) 100 UNIT/ML pen Inject 0-6 Units into the skin 3 times daily (before meals) Low Dose Correction Algorithm: BS  No Insulin, -199 1 Unit, 200-249 2 Units, 250-299 3 Units, 300-349 4 Units, 350-399 5 Units, 400 and above give 6 Units 10/3/17   Kirt Dao MD   carvedilol (COREG) 6.25 MG tablet Take 6.25 mg by mouth 2 times daily    Historical Provider, MD   aspirin 81 MG chewable tablet Take 81 mg by mouth daily    Historical Provider, MD   lisinopril (PRINIVIL;ZESTRIL) 40 MG tablet Take 40 mg by mouth daily    Historical Provider, MD   pantoprazole sodium (PROTONIX) 40 MG PACK packet Take 40 mg by mouth every morning (before breakfast)    Historical Provider, MD   SITagliptin-MetFORMIN HCl ER (JANUMET XR) 100-1000 MG TB24 Take 1 tablet by mouth daily    Historical Provider, MD   albuterol sulfate HFA (PROVENTIL HFA) 108 (90 BASE) MCG/ACT inhaler Inhale 2 puffs into the lungs every 4 hours as needed for Wheezing or Shortness of Breath With spacer (and mask if indicated). Thanks. 12/24/16 3/29/17  Diego Alba MD   clopidogrel (PLAVIX) 75 MG tablet Take 75 mg by mouth daily    Historical Provider, MD        aspirin suppository 300 mg, Once  fentaNYL (SUBLIMAZE) injection 50 mcg, Once  dilTIAZem 25 MG/5ML injection,   amiodarone (CORDARONE) 450 mg in dextrose 5 % 250 mL infusion, Continuous  perflutren lipid microspheres (DEFINITY) injection 1.65 mg, ONCE PRN  heparin (porcine) injection 4,000 Units, PRN  heparin (porcine) injection 2,000 Units, PRN  heparin 25,000 units in dextrose 5% 250 mL (premix) infusion, Continuous  eptifibatide (INTEGRILIN) 0.75 mg/mL infusion, Continuous  heparin (porcine) 25,000 Units in dextrose 5 % 500 mL infusion (FOR IMPELLA PURGE), Continuous  heparin 25,000 units in dextrose 5% 250 mL (premix) infusion, Continuous        Allergies   No known allergies    Social History     Social History     Tobacco History     Smoking Status  Current Every Day Smoker Smoking Frequency  1 pack/day for 61 years (64 pk yrs) Smoking Tobacco Type  Cigarettes    Smokeless Tobacco Use  Never Used    Tobacco Comment  1 pack a day          Alcohol History     Alcohol Use Status  No          Drug Use     Drug Use Status  No          Sexual Activity     Sexually Active  Not Currently                Family History   History reviewed. No pertinent family history. Review of Systems   Review of Systems   Constitutional: Positive for fatigue. HENT: Positive for trouble swallowing. Eyes: Negative. Respiratory: Positive for cough and shortness of breath. Cardiovascular: Negative. Gastrointestinal: Negative. Endocrine: Negative. Genitourinary: Negative. Musculoskeletal: Negative. Skin: Negative. Allergic/Immunologic: Negative.     Neurological: Negative. Hematological: Negative. Psychiatric/Behavioral: Negative. Physical Exam   BP (!) 137/100   Pulse 63   Temp 98.7 °F (37.1 °C) (Bladder)   Resp 27   Ht 5' 7\" (1.702 m)   Wt 167 lb (75.8 kg)   SpO2 98%   BMI 26.16 kg/m²      Physical Exam  Vitals and nursing note reviewed. Constitutional:       General: He is not in acute distress. Appearance: Normal appearance. He is not ill-appearing. HENT:      Head: Normocephalic and atraumatic. Right Ear: External ear normal.      Left Ear: External ear normal.      Nose: Nose normal.      Mouth/Throat:      Mouth: Mucous membranes are moist.      Pharynx: Oropharynx is clear. Comments: Mallampati 3  Eyes:      General: No scleral icterus. Extraocular Movements: Extraocular movements intact. Conjunctiva/sclera: Conjunctivae normal.      Pupils: Pupils are equal, round, and reactive to light. Cardiovascular:      Rate and Rhythm: Normal rate and regular rhythm. Pulses: Normal pulses. Heart sounds: Normal heart sounds. No murmur heard. No friction rub. Pulmonary:      Effort: No respiratory distress. Breath sounds: No stridor. Wheezing present. No rhonchi or rales. Chest:      Chest wall: No tenderness. Abdominal:      General: Abdomen is flat. Bowel sounds are normal. There is no distension. Tenderness: There is no abdominal tenderness. There is no guarding. Musculoskeletal:         General: No swelling or tenderness. Normal range of motion. Cervical back: Normal range of motion and neck supple. No rigidity. Skin:     General: Skin is warm and dry. Coloration: Skin is not jaundiced. Neurological:      General: No focal deficit present. Mental Status: He is alert and oriented to person, place, and time. Mental status is at baseline. Cranial Nerves: No cranial nerve deficit. Sensory: No sensory deficit. Motor: No weakness.       Gait: Gait normal.   Psychiatric: Mood and Affect: Mood normal.         Thought Content: Thought content normal.         Judgment: Judgment normal.         Labs    CBC:  Recent Labs     09/11/21 0100 09/11/21 0211 09/11/21  0504   WBC 6.2  --  9.2   RBC 3.95*  --  3.47*   HGB 10.6* 9.2* 9.2*   HCT 33.1*  --  28.4*   MCV 83.6  --  82.0   RDW 14.9  --  14.8     --  200     CHEMISTRIES:  Recent Labs     09/11/21 0100 09/11/21 0211     --    K 3.4*  --    CL 99  --    CO2 31  --    BUN 24*  --    CREATININE 1.4* 1.7*   GLUCOSE 134*  --    MG 2.00  --      PT/INR:  Recent Labs     09/11/21 0100   PROTIME 11.2   INR 0.99     APTT:  Recent Labs     09/11/21 0100 09/11/21  0504 09/11/21  0833   APTT 34.0 >248.0* 34.3     LIVER PROFILE:  Recent Labs     09/11/21 0100   AST 11*   ALT 8*   BILITOT 0.4   ALKPHOS 168*       Imaging/Diagnostics   XR CHEST PORTABLE    Result Date: 9/11/2021  Unchanged moderate cardiomegaly, central pulmonary vascular congestion and mild bilateral pleural effusions. XR CHEST PORTABLE    Result Date: 9/11/2021  1. New enlargement of the cardiac silhouette may reflect cardiomegaly or pericardial effusion. 2. New right infrahilar opacity with differential diagnosis including atelectasis, pneumonia, or edema in the setting of congestive heart failure. Assessment        Plan   Neurological  History of CVA in 2017  Alert and oriented to person and time at this time        Cardiovascular  CAD  CT surgery consulted and will decide about further intervention  Cardiology has been consulted initially did cardiac catheterization  Vascular surgery has been consulted    Initially patient was on amiodarone drip will be changed to p.o.   Patient did not require pressors at this time      Echo is pending  Preliminary report says that EF is less than 15%      CHF  We will give dose of Lasix 40 mg IV x1 as the patient is wheezing      Vascular issues  Left leg not having dopplerable pulses  Vascular surgery has been consulted    Pulmonary  Having apneic episodes, more consistent with Cheyne-Landis respiration  If the patient has significant apneic episodes from central/Cheyne-Landis respiration would suggest doing a CPAP of 10 cm of water pressure  It is contraindicated to place the patient on a BiPAP therapy or AVAPS therapy    COPD  Will start on Symbicort instead of the [facility]  We will place on Spiriva  Albuterol as needed    Venous blood gas shows normal pH with a PCO2 of 51 which is probably normal for venous gas  Will follow if necessary      GI  N.p.o. for now  History of esophageal stricture and GERD  Protonix IV    Liver function is slightly elevated  We will watch    Endocrine  Diabetes  Monitor blood sugars  Blood sugar seem to be controlled  We will add sliding scale insulin if needed      Prophylaxis  Patient is on heparin drip  We will add Protonix IV    Hematology  White count is normal  H&H is stable we will follow        Lines  Patient has a left IJ placed 9/10/2021  Patient has [place] Sharper placed in the left femoral artery 9/10/2021    CODE STATUS full        Discussed with son at the bedside  Discussed with cardiology  Discussed with Luis M Disla Provided: This patient had a critical illness or injury that acutely impaired one or more vital organ systems such that there was a high probability of imminent or life-threatening deterioration in the patient's condition. This required high complexity decision-making to assess, manipulate, and support vital system function(s) to treat single or multiple vital organ system failure and/or to prevent further life-threatening deterioration of the patient's condition.  Total attending physician time, excluding unbundled procedures, spent at the bedside, and away from the bedside but on the unit or floor, reviewing laboratory test results, discussing care with medical staff, and discussing care with family when the patient was unable or incompetent to participate:   Critical Care Time (Minutes) # 35   (Discontinuous)       Electronically signed by Ankita Guevara MD on 9/11/21 at 10:19 AM EDT

## 2021-09-11 NOTE — CONSULTS
Pharmacy to Manage Heparin Infusion per [facility]    Dx: Atrial Fibrillation/ ACS  Pt wt = 75.8 kg. Baseline aPTT = Pending. Low Dose Heparin Infusion  Heparin 60 units/kg IVP bolus followed by Heparin infusion at 12 units/kg/hr (recommended initial max dose 1000 units./hr). Recheck aPTT in 6 hours. Goal aPTT = 60-90 seconds.   Xuan Jones PharmD  9/11/2021 1:18 AM    9/11 1545  aPTT = 56.3 sec  Purge rate running at 5.5 ml/hr ( 275 units/hr)  Increase supplemental heparin rate to 1060 units/hr  Rafal Pina PharmD  9/11/2021 at 4:43 PM

## 2021-09-11 NOTE — PROGRESS NOTES
Dr. Kathy Tompkins at bedside at this time for consult. Pt and pt son/POA \"Real\" at bedside. POC reviewed and updated. Per Dr. Kathy Tompkins pt is not a sx candidate for CABG.

## 2021-09-11 NOTE — PROCEDURES
CARDIAC CATHETERIZATION REPORT     Procedure Date:  2021  Patient Name: Myron Ramos  MRN: [id number] : 1945      INDICATION     Acute lateral STEMI  Cardiogenic shock  Afib w/ rvr    PROCEDURES PERFORMED     Left heart catheterization  LVgram  Aortogram  Coronary angiogam  Coronary cath  Monitoring of moderate conscious sedation    Insertion of short term external heart assist system into heart, intraoperative, percutaneous approach  Assistance with cardiac output using impeller pump, continuous          PROCEDURE DESCRIPTION       This was felt to be an emergency procedure. Patient was prepped draped in the usual sterile fashion. Local anaesthetic was applied over puncture site. Using a front wall technique, a 6 [place] Terumo sheath was inserted into right radial artery. Verapamil, nitroglycerin, nicardipine were administered through the sheath. Heparin was administered. Diagnostic 5 Beninese pigtail, ultra catheters were used for diagnostic angiograms. Pigtail was used for left ventricular angiogram as well as aortogram.  Following diagnostic coronary angiography, aortogram had been obtained and attention turned towards the left groin where using fluoroscopic and ultrasound guidance and a micropuncture kit, a 5 [place]inese sheath was inserted into the left common femoral artery. The site was then further dilated with a 10 and 12 Western Radha dilators and ultimately a long Impella sheath was inserted from the left groin into the descending aorta. Pigtail catheter was then used to recross the aortic valve and Impella 0.018 inch wire was advanced to the left ventricle and this wire was used for Impella placement. The long Impella sheath was then pulled out of the femoral artery and was peeled away and it was replaced with the remaining Impella short sheath.   Follow-up angiogram of the left side did show plaque/clot however there did appear to be flow distally and as there did not appear to be stenosis. PLV has 20 to 30% alcupizi-mul-icyyui stenosis. CONCLUSIONS:     Multivessel CAD/ASHD  Probable LAD     Likely culprit is mid circumflex lesion although there may be also additional ischemia related to diagonal branches from the LAD given lateral ST elevation. Continue heparin/integrilin drips. We will consult with vascular surgery regarding PVD/possible limb ischemia, will also place ICU/pulmonary consult for shock/respiratory failure. Case discussed with CT surgery, Dr. Virginie Irving, consensus was for temporization with inotropic/vasopressor support and mechanical support with Impella. Patient may be considered for CABG thereafter. If not felt to be a candidate for CABG, can consider high risk PCI although PCI options may be limited due to severe CAD/PVD and comorbidities. D/w pt's son, he stated patient may ultimately pursue hospice if he is not felt to be a candidate for revascularization. Family understand prognosis is guarded and there is high risk of deterioration/death.

## 2021-09-11 NOTE — ED NOTES
Initial EKG @0057  Interventional cardiologist called @9941  Cardiologist Dr. Lexine Peabody  return call @ Harinder Barker notified Toby Ramos  09/11/21 0111

## 2021-09-11 NOTE — PROGRESS NOTES
[address]   Progress Note  Cardiology    CC: throat pain    HPI: feels better. No throat/chest pain    Past Medical History   has a past medical history of Arthritis, CAD (coronary artery disease), Cholelithiasis, COPD (chronic obstructive pulmonary disease) ([facility] [address] CVA (cerebral vascular accident) ([address] Diabetes mellitus ([address] Esophageal stricture, Falls, GERD (gastroesophageal reflux disease), GI bleed, Hiatal hernia, Hyperlipidemia, Hypertension, Nephrolithiasis, Nodule of kidney, Parotid nodule, Pulmonary nodule, PVD (peripheral vascular disease) ([address] Rib fractures, and Vertigo. Past Surgical History   has a past surgical history that includes Coronary angioplasty with stent (2001); femoral bypass (Bilateral); Upper gastrointestinal endoscopy (N/A, 02/19/2021); other surgical history; Femur fracture surgery (Left, 03/05/2021); Esophagus dilation (2016); and Cataract removal (Bilateral, 2010). Social History   reports that he has been smoking cigarettes. He has a 61.00 pack-year smoking history. He has never used smokeless tobacco. He reports that he does not drink alcohol and does not use drugs. Family History  family history is not on file. Medications  Prior to Admission medications    Medication Sig Start Date End Date Taking? Authorizing Provider   lidocaine (LIDODERM) 5 % Place 1 patch onto the skin daily 12 hours on, 12 hours off. 8/14/21   GAMAL Gale   tiotropium (SPIRIVA RESPIMAT) 2.5 MCG/ACT AERS inhaler Inhale 2 puffs into the lungs daily 5/24/21   Maria Guadalupe Harmon MD   [facility] [place]  [place] ([facility]) MISC 1 Device by Does not apply route as needed (Status post  hip surgery) 3/25/21   Kasia Nicholas MD   potassium chloride (KLOR-CON M) 20 MEQ extended release tablet Take 1 tablet by mouth daily for 7 days 3/9/21 3/16/21  Mack Romo MD   enoxaparin (LOVENOX) 40 MG/0.4ML injection Inject 0.4 mLs into the skin daily 3/7/21   GAMAL Christensen albuterol sulfate HFA (PROAIR HFA) 108 (90 Base) MCG/ACT inhaler Inhale 2 puffs into the lungs every 6 hours as needed for Wheezing 4/9/18   Deidra Armas MD   acetaminophen (TYLENOL) 325 MG tablet Take 2 tablets by mouth every 4 hours as needed for Pain or Fever Maximum dose of acetaminophen is 4000 mg from all sources in 24 hours. 10/3/17   Ira Velarde MD   ipratropium-albuterol (DUONEB) 0.5-2.5 (3) MG/3ML SOLN nebulizer solution Inhale 3 mLs into the lungs 3 times daily 10/3/17   Ira Velarde MD   mometasone-formoterol Layla Grant) 200-5 MCG/ACT inhaler Inhale 2 puffs into the lungs 2 times daily Substituted for Budesonide-Formoterol (SYMBICORT).  10/3/17   Ira Velarde MD   atorvastatin (LIPITOR) 80 MG tablet Take 1 tablet by mouth daily 10/3/17   Ira Velarde MD   amLODIPine (NORVASC) 10 MG tablet Take 0.5 tablets by mouth nightly Note: new lower dose 10/3/17   Ira Velarde MD   furosemide (LASIX) 40 MG tablet Take 1 tablet by mouth daily 10/3/17   Ira Velarde MD   [facility]) 60 MG CP24 extended release capsule Take 1 capsule by mouth daily 10/3/17   Ira Velarde MD   insulin glargine (LANTUS SOLOSTAR) 100 UNIT/ML injection pen Inject 20 Units into the skin nightly 10/3/17   Ira Velarde MD   insulin lispro (HUMALOG KWIKPEN) 100 UNIT/ML pen Inject 0-6 Units into the skin 3 times daily (before meals) Low Dose Correction Algorithm: BS  No Insulin, -199 1 Unit, 200-249 2 Units, 250-299 3 Units, 300-349 4 Units, 350-399 5 Units, 400 and above give 6 Units 10/3/17   Ira Velarde MD   carvedilol (COREG) 6.25 MG tablet Take 6.25 mg by mouth 2 times daily    Historical Provider, MD   aspirin 81 MG chewable tablet Take 81 mg by mouth daily    Historical Provider, MD   lisinopril (PRINIVIL;ZESTRIL) 40 MG tablet Take 40 mg by mouth daily    Historical Provider, MD   pantoprazole sodium (PROTONIX) 40 MG PACK packet Take 40 mg by mouth every morning (before breakfast)    Historical Provider, MD   SITagliptin-MetFORMIN HCl ER (JANUMET XR) 100-1000 MG TB24 Take 1 tablet by mouth daily    Historical Provider, MD   albuterol sulfate HFA (PROVENTIL HFA) 108 (90 BASE) MCG/ACT inhaler Inhale 2 puffs into the lungs every 4 hours as needed for Wheezing or Shortness of Breath With spacer (and mask if indicated). Thanks. 12/24/16 3/29/17  Andrés Liu MD   clopidogrel (PLAVIX) 75 MG tablet Take 75 mg by mouth daily    Historical Provider, MD       Allergies  No known allergies    Review of Systems:   Reviewed. No changes except as noted in HPI and A/P      Lab Results   Component Value Date    WBC 7.2 09/11/2021    HGB 9.3 (L) 09/11/2021    HCT 28.5 (L) 09/11/2021    MCV 83.0 09/11/2021     09/11/2021     Lab Results   Component Value Date    CREATININE 1.4 (H) 09/11/2021    BUN 29 (H) 09/11/2021     09/11/2021    K 3.2 (L) 09/11/2021     09/11/2021    CO2 28 09/11/2021     Lab Results   Component Value Date    INR 0.99 09/11/2021    PROTIME 11.2 09/11/2021       I reviewed EKGs and radiology imaging. Pertinent findings and changes as described in assessment below. Physical Examination:    BP (!) 150/115   Pulse 58   Temp 98.7 °F (37.1 °C) (Bladder)   Resp 29   Ht 5' 7\" (1.702 m)   Wt 167 lb (75.8 kg)   SpO2 97%   BMI 26.16 kg/m²      General Appearance:  Alert, no distress, appears stated age   Head:  Normocephalic, without obvious abnormality, atraumatic   Eyes:  PERRL, conjunctiva/corneas clear         Nose: Nares normal, no drainage or sinus tenderness   Throat: Lips, mucosa, and tongue normal   Neck: Supple, symmetrical, trachea midline, no adenopathy         Lungs:   Clear to auscultation bilaterally    Chest Wall:  No tenderness or deformity   Heart:  Regular rate and rhythm, S1, S2 normal, no murmur, rub or gallop   Abdomen:   Soft, non tender, normal bowel sounds                Extremities: Impella in place left groin.  No cyanosis or edema   Pulses: Unable palpate left pedal pulses. + popliteal pulse    Skin: Skin color, texture, turgor normal, no rashes    Pysch: Normal mood and affect   Neurologic: Normal gross motor and sensory exam.        Assessment:    STEMI   CAD - severe multivessel. Cardiogenic shock - improved post impella  ICMP - severe reduced LVEF  PAF - unstable, currently NSR  [place] 4 - possible ppt by PAF in setting severe CAD. Currently pain free  HTN - poor control at this time  HLD  Hypokalemia     Plan  Change amio to PO  Full dose heparin drip - adjust according to PTT  Cont integrelin pending CV[facility] consult.  If not CABG candidate, will stop integrelin and resume Brilinta   ASA, statin, BBlk, ACE  Replete K  Impella x 48hrs provided leg remains ok from vascular standpoint    40 min critical care      Jm Norton MD, 9/11/2021 10:56 AM

## 2021-09-11 NOTE — PROGRESS NOTES
Brief Pre-Op Note/Sedation Assessment      Yadiel Zuniga  1945  [id number]      [id number]  1:57 AM    Planned Procedure: Cardiac Catheterization Procedure    Post Procedure Plan: Return to same level of care    Consent: Consent was unable to be obtained due to patient's condition. Chief Complaint: STEMI      Indications for Cath Procedure:  ACS <= 24 hrs  Anginal Classification within 2 weeks:  CCS IV - Inability to perform any activity without angina or angina at rest, i.e., severe limitation  NYHA Heart Failure Class within 2 weeks: Class IV - Symptoms of HF at rest, Newly Diagnosed? No, Heart Failure Type: Systolic  Is Cath Lab Visit Valve-related?: No  Surgical Risk: N/A  Functional Type: N/A    Anti- Anginal Meds within 2 weeks:   Yes: Ca Channel Blockers    Stress or Imaging Studies Performed (within 6 months):  None     Vital Signs:  BP 95/72   Pulse 150   Temp 98.2 °F (36.8 °C) (Axillary)   Resp 21   Ht 5' 7\" (1.702 m)   Wt 167 lb (75.8 kg)   SpO2 96%   BMI 26.16 kg/m²     Allergies:   Allergies   Allergen Reactions    No Known Allergies        Past Medical History:  Past Medical History:   Diagnosis Date    Arthritis     CAD (coronary artery disease)     [facility]) ([facility] [address]     Diabetes mellitus ([facility] [address]     Hyperlipidemia     Hypertension          Surgical History:  Past Surgical History:   Procedure Laterality Date    CORONARY ANGIOPLASTY WITH STENT PLACEMENT      FEMORAL BYPASS Bilateral     FEMUR FRACTURE SURGERY Left 3/5/2021    LEFT FEMUR INTRAMEDULLARY NAIL GRAZYNA INSERTION performed by Lico Costa MD at [facility] 6 HISTORY      LEFT FEMUR INTRAMEDULLARY NAIL GRAZYNA INSERTION (Left Hip    UPPER GASTROINTESTINAL ENDOSCOPY N/A 2/19/2021    EGD CONTROL HEMORRHAGE performed by Feliciano Cee MD at [facility] ENDOSCOPY         Medications:  Current Facility-Administered Medications   Medication Dose Route Frequency Provider Last Rate Last Admin    heparin (porcine) injection 4,000 Units  4,000 Units IntraVENous PRN Benitez Allan MD        heparin (porcine) injection 2,000 Units  2,000 Units IntraVENous PRN Benitez Allan MD        heparin 25,000 units in dextrose 5% 250 mL (premix) infusion  910 Units/hr IntraVENous Continuous Benitez Allan MD 9.1 mL/hr at 09/11/21 0123 910 Units/hr at 09/11/21 0123    dilTIAZem 125 mg in dextrose 5 % 125 mL infusion  5-15 mg/hr IntraVENous Continuous Benitez Allan MD        phenylephrine (PEREZ-SYNEPHRINE) 50 mg in dextrose 5 % 250 mL infusion   mcg/min IntraVENous Once Benitez Allan MD        aspirin suppository 300 mg  300 mg Rectal Once Benitez Allan MD        fentaNYL (SUBLIMAZE) injection 50 mcg  50 mcg IntraMUSCular Once Benitez Allan MD        dilTIAZem 25 MG/5ML injection              Current Outpatient Medications   Medication Sig Dispense Refill    lidocaine (LIDODERM) 5 % Place 1 patch onto the skin daily 12 hours on, 12 hours off. 30 patch 0    tiotropium (SPIRIVA RESPIMAT) 2.5 MCG/ACT AERS inhaler Inhale 2 puffs into the lungs daily 1 Inhaler 5    Misc. Devices (ROLLING WALKER/BURGUNDY) MISC 1 Device by Does not apply route as needed (Status post  hip surgery) 1 each 0    potassium chloride (KLOR-CON M) 20 MEQ extended release tablet Take 1 tablet by mouth daily for 7 days 7 tablet 0    enoxaparin (LOVENOX) 40 MG/0.4ML injection Inject 0.4 mLs into the skin daily 20 Syringe 0    albuterol sulfate HFA (PROAIR HFA) 108 (90 Base) MCG/ACT inhaler Inhale 2 puffs into the lungs every 6 hours as needed for Wheezing 1 Inhaler 3    acetaminophen (TYLENOL) 325 MG tablet Take 2 tablets by mouth every 4 hours as needed for Pain or Fever Maximum dose of acetaminophen is 4000 mg from all sources in 24 hours.  120 tablet 3    ipratropium-albuterol (DUONEB) 0.5-2.5 (3) MG/3ML SOLN nebulizer solution Inhale 3 mLs into the lungs 3 times daily 360 mL 0    mometasone-formoterol (DULERA) 200-5 MCG/ACT inhaler Inhale 2 puffs into the lungs 2 times daily Substituted for Budesonide-Formoterol (SYMBICORT). 1 Inhaler 0    atorvastatin (LIPITOR) 80 MG tablet Take 1 tablet by mouth daily 30 tablet 0    amLODIPine (NORVASC) 10 MG tablet Take 0.5 tablets by mouth nightly Note: new lower dose 30 tablet 3    furosemide (LASIX) 40 MG tablet Take 1 tablet by mouth daily 60 tablet 0    trospium (SANCTURA) 60 MG CP24 extended release capsule Take 1 capsule by mouth daily 30 capsule 0    insulin glargine (LANTUS SOLOSTAR) 100 UNIT/ML injection pen Inject 20 Units into the skin nightly 5 Pen 3    insulin lispro (HUMALOG KWIKPEN) 100 UNIT/ML pen Inject 0-6 Units into the skin 3 times daily (before meals) Low Dose Correction Algorithm: BS  No Insulin, -199 1 Unit, 200-249 2 Units, 250-299 3 Units, 300-349 4 Units, 350-399 5 Units, 400 and above give 6 Units 5 Pen 3    carvedilol (COREG) 6.25 MG tablet Take 6.25 mg by mouth 2 times daily      aspirin 81 MG chewable tablet Take 81 mg by mouth daily      lisinopril (PRINIVIL;ZESTRIL) 40 MG tablet Take 40 mg by mouth daily      pantoprazole sodium (PROTONIX) 40 MG PACK packet Take 40 mg by mouth every morning (before breakfast)      SITagliptin-MetFORMIN HCl ER (JANUMET XR) 100-1000 MG TB24 Take 1 tablet by mouth daily      albuterol sulfate HFA (PROVENTIL HFA) 108 (90 BASE) MCG/ACT inhaler Inhale 2 puffs into the lungs every 4 hours as needed for Wheezing or Shortness of Breath With spacer (and mask if indicated). Thanks. 1 Inhaler 1    clopidogrel (PLAVIX) 75 MG tablet Take 75 mg by mouth daily             Pre-Sedation:    Pre-Sedation Documentation and Exam:  I have personally completed a history, physical exam & review of systems for this patient (see notes).     Prior History of Anesthesia Complications:   none    Modified Mallampati:  III (soft palate, base of uvula visible)    ASA Classification:  Class 4 - A patient with an incapacitating systemic disease that is a constant threat to life      Arnoldo Scale: Activity:  2 - Able to move 4 extremities voluntarily on command  Respiration:  1 - Dyspnic, shallow, or limited breathing  Circulation:  2 - BP+/- 20mmHg of normal  Consciousness:  1 - Arousable on calling  Oxygen Saturation (color):  1 - Needs oxygen to maintain oxygen saturation >90%    Sedation/Anesthesia Plan:  Guard the patient's safety and welfare. Minimize physical discomfort and pain. Minimize negative psychological responses to treatment by providing sedation and analgesia and maximize the potential amnesia. Patient to meet pre-procedure discharge plan.     Medication Planned:  midazolam intravenously and fentanyl intravenously    Patient is an appropriate candidate for plan of sedation: yes      Electronically signed by Henrique Maria MD on 9/11/2021 at 1:57 AM

## 2021-09-12 NOTE — PROGRESS NOTES
Intensive Care Progress Note    Patient: Nyla Sales MRN: [id number]  Date of  Admission: 9/11/2021   YOB: 1945  Age: 66 y.o. Sex: male    Unit: [facility] C2 CVU  Room/Bed: [facility]/1292-86 Attending Physician: No att. providers found   Admitting Physician: Morgan Andrew        Chief Complaint   Patient presents with    Chest Pain       called 911 for throat discomfort. EMS gave 324mg of ASA and nitro x 1      ICU admission, status post myocardial infarction status post Impella placement     History Obtained From   patient, electronic medical record     History of Present Illness   History of present illness this is a 64-year-old gentleman with a history of COPD CAD and diabetes and hypertension who comes to the emergency room because of initial throat discomfort however was found to have issues with T wave abnormality on EKG. Patient was found to have atrial fibrillation with RVR. Patient started having issues with throat discomfort and this progressively was worked up. Patient was found to be having ST elevation in the lateral leads and code STEMI was initiated. Patient was taken to the Cath Lab and found to have multiple vessel CAD. Patient was started on heparin and Integrilin drips. And started on vasopressor and inotropic support. Patient was moved to the ICU with an [facility]. CT surgery has been consulted along with vascular surgery as the patient's left leg does have some issues with vascular flow distally        Subjective:    Patient remains on Impella  CT surgery, cardiology and vascular surgery have seen the patient  To decide if cardiac catheterization will be the best option tomorrow  Patient is not a candidate for bypass surgery      No chest pains  No nausea or vomiting      ROS:A comprehensive review of systems was negative except for: above    Objective:          Intake and Output:   Current Shift:   09/12 0701 - 09/12 1500  In: -   Out: 300 [Urine:300]  Last three shifts: 09/11 0701 - 09/12 0700  In: 0   Out: 3750 [Urine:3750]    Vent settings for last 24 hours:  [unfilled]    Hemodynamic parameters for last 24 hours:  [unfilled]    Physical Exam:   Patient Vitals for the past 24 hrs:   BP Temp Temp src Pulse Resp SpO2   09/12/21 1100 (!) 171/87 99 °F (37.2 °C) Bladder 64 30 96 %   09/12/21 1000 (!) 155/86 -- -- 66 24 --   09/12/21 0900 (!) 160/96 98.9 °F (37.2 °C) Bladder 61 16 97 %   09/12/21 0700 (!) 165/91 -- -- 62 22 --   09/12/21 0600 (!) 161/88 -- -- 68 29 --   09/12/21 0500 (!) 157/89 -- -- 64 29 95 %   09/12/21 0400 (!) 158/86 99 °F (37.2 °C) Bladder 75 23 --   09/12/21 0300 (!) 160/88 -- -- 65 -- --   09/12/21 0200 (!) 155/92 -- -- 66 29 92 %   09/12/21 0100 (!) 161/101 -- -- 64 15 96 %   09/12/21 0000 (!) 178/97 99.3 °F (37.4 °C) Bladder 64 14 --   09/11/21 2300 (!) 176/108 -- -- 66 14 --   09/11/21 2200 (!) 177/109 -- -- 64 (!) 33 100 %   09/11/21 2100 (!) 164/110 -- -- 63 22 100 %   09/11/21 2000 (!) 169/111 98.8 °F (37.1 °C) Bladder 64 18 --   09/11/21 1900 (!) 161/106 -- -- 63 -- 100 %   09/11/21 1839 (!) 159/105 -- -- 63 -- --   09/11/21 1800 (!) 159/105 -- -- 63 -- --   09/11/21 1700 (!) 164/105 -- -- 61 -- 100 %   09/11/21 1600 (!) 169/110 -- -- 61 16 100 %   09/11/21 1500 (!) 172/109 -- -- 61 22 100 %   09/11/21 1400 (!) 165/109 -- -- 59 28 100 %   09/11/21 1300 (!) 163/118 -- -- 60 (!) 31 99 %   09/11/21 1216 -- -- -- -- (!) 31 98 %            Physical Exam  Vitals and nursing note reviewed. Constitutional:       General: He is not in acute distress. Appearance: Normal appearance. HENT:      Head: Normocephalic and atraumatic. Right Ear: External ear normal.      Left Ear: External ear normal.      Nose: Nose normal.      Mouth/Throat:      Mouth: Mucous membranes are moist.      Pharynx: Oropharynx is clear. Eyes:      General:         Right eye: No discharge. Left eye: No discharge.       Extraocular Movements: Extraocular movements intact. Conjunctiva/sclera: Conjunctivae normal.      Pupils: Pupils are equal, round, and reactive to light. Cardiovascular:      Rate and Rhythm: Normal rate and regular rhythm. Pulses: Normal pulses. Heart sounds: No murmur heard. Pulmonary:      Effort: No respiratory distress. Breath sounds: No stridor. No rhonchi or rales. Comments: Decreased breath sounds bilaterally  Chest:      Chest wall: No tenderness. Abdominal:      General: Bowel sounds are normal. There is no distension. Palpations: Abdomen is soft. There is no mass. Tenderness: There is no abdominal tenderness. Hernia: No hernia is present. Musculoskeletal:         General: No swelling. Normal range of motion. Cervical back: Normal range of motion. No rigidity. Right lower leg: Edema present. Left lower leg: Edema present. Skin:     General: Skin is warm and dry. Coloration: Skin is not jaundiced or pale. Comments: Left lower extremity is cool to touch compared to the right side   Neurological:      General: No focal deficit present. Mental Status: He is alert and oriented to person, place, and time. Mental status is at baseline. Cranial Nerves: No cranial nerve deficit. Sensory: No sensory deficit. Psychiatric:         Mood and Affect: Mood normal.         Behavior: Behavior normal.         Thought Content: Thought content normal.           Labs:   Recent Labs     09/11/21  1013 09/11/21  2342 09/12/21  0550   WBC 7.2 9.1 9.0   HGB 9.3* 9.7* 9.3*   HCT 28.5* 29.5* 27.7*    154 142      Recent Labs     09/11/21  0100 09/11/21  1013 09/12/21  0550    139 143   K 3.4* 3.2* 3.2*   CL 99 102 102   CO2 31 28 30   BUN 24* 29* 23*   GLUCOSE 134* 171* 151*       Additional labs:    Radiology, images personally reviewed.   Yes  XR CHEST PORTABLE [[id number]] Collected: 09/12/21 0756      Order Status: Completed Updated: 09/12/21 0803     Narrative:       EXAMINATION:   ONE XRAY VIEW OF THE CHEST     9/12/2021 6:01 am     COMPARISON:   09/11/2021     HISTORY:   ORDERING SYSTEM PROVIDED HISTORY: CHF   TECHNOLOGIST PROVIDED HISTORY:   Reason for exam:->CHF   Reason for Exam: CHF   Type of Exam: Subsequent/Follow-up     FINDINGS:   An Impella device is in normal position, distal tip projecting over the   cardiac apex.  Left jugular central line terminates over the distal superior   vena cava. The cardiac silhouette is globally prominent.  The lungs are hyperinflated. Lungs appear stable.  There is no consolidation appreciated.  No pneumothorax.      Impression:       Supportive devices, including Impella, project in normal positions. Lungs are stable. Other imaging: Not indicated       Micro: Negative growth to date    Assessment:     Active Problems:    * No active hospital problems. *  Resolved Problems:    * No resolved hospital problems. *      Discussion / Plan:       Neurological  History of CVA in 2017  Alert and oriented to person and time at this time           Cardiovascular  CAD  CT surgery consulted and will decide about further intervention  Cardiology has been consulted initially did cardiac catheterization  Vascular surgery has been consulted    N.p.o. after midnight  Cardiology to consider PCI tomorrow      On oral amiodarone  Norvasc  Aspirin 81 mg daily  Lipitor 80 mg nightly  Coreg 3.125 twice daily  Brilinta 90 mg twice daily     Echo  Summary   An Impella appears well seated at a depth of 4.7 cm. Spectral interference   from the LVAD reduces sensitivity of Doppler. Left ventricular systolic function is severely reduced with a visually   estimated ejection fraction of <20 %. EF estimated by 3D at 13 %. The left ventricle is severely dilated in size with mild posterior   hypertrophy. Global akinesis with some minimal preserved function of the mid and basal   anterior, anterolateral, and inferolateral wall segments.    Grade II diastolic dysfunction with elevated LV pressure. Right ventricular systolic function visually appears normal, but TAPSE and S   velocity indicate reduced function. The left atrium appears moderately enlarged. Moderate mitral regurgitation. Mild aortic regurgitation. Mild tricuspid regurgitation. The IVC is dilated in size (>2.1 cm) and collapses <50% with respiration   consistent with markedly elevated right atrial pressures (15 mmHg) . There is a small circumferential pericardial effusion noted.              CHF  Patient was given 1 dose of Lasix yesterday with improvement in wheezing       Vascular issues  Left leg not having dopplerable pulses  Vascular surgery has been consulted, to follow     Pulmonary  Having apneic episodes, more consistent with Cheyne-Landis respiration  If the patient has significant apneic episodes from central/Cheyne-Landis respiration would suggest doing a CPAP of 10 cm of water pressure  It is contraindicated to place the patient on a BiPAP therapy or AVAPS therapy    We will continue to follow     COPD  On Symbicort and Spiriva  Albuterol as needed     Venous blood gas shows normal pH with a PCO2 of 51 which is probably normal for venous gas  Will follow if necessary        GI  N.p.o. for now  History of esophageal stricture and GERD  Protonix IV     Liver function is slightly elevated  We will watch     Endocrine  Diabetes  Monitor blood sugars  Blood sugar seem to be controlled  We will add sliding scale insulin if needed        Prophylaxis  Patient is on heparin drip  on Protonix IV     Hematology  White count is normal  H&H is stable we will follow           Lines  Patient has a left IJ placed 9/10/2021  Patient has Carliss Dearth placed in the left femoral artery 9/10/2021     CODE STATUS full           Discussed with nursing              Critical Care Services Provided:   This patient had a critical illness or injury that acutely impaired one or more vital organ systems such that there was a high probability of imminent or life-threatening deterioration in the patient's condition. This required high complexity decision-making to assess, manipulate, and support vital system function(s) to treat single or multiple vital organ system failure and/or to prevent further life-threatening deterioration of the patient's condition. Total attending physician time, excluding unbundled procedures, spent at the bedside, and away from the bedside but on the unit or floor, reviewing laboratory test results, discussing care with medical staff, and discussing care with family when the patient was unable or incompetent to participate:   Critical Care Time (Minutes) # 35   (Discontinuous)                     Leif Mills MD  [facility]  Pulmonary, Critical Care and Sleep Medicine  Office : [phone]    Please note that some or all of this record was generated using voice recognition software. If there are any questions about the content of this document, please contact the author as some errors in transcription may have occurred.

## 2021-09-12 NOTE — PROGRESS NOTES
Pt with sudden onset PAF RVR 140s. Pt asymptomatic and hemodynamically stable. Pt spontaneously converted back to NS 60s with PACs. Pt then converted back to AF RVR and spontaneously back to NSR 60s. Each episode lasting less than 1-2min.

## 2021-09-12 NOTE — PROGRESS NOTES
Call placed for cardiac internationalist due to a change in urine color from yellow to pink. Call received from Dr. Terese Rueda. Updated on pt situation and orders received to reduce P-Level from P-9 to P-7 with instructions to call back if pt does not tolerate change. Orders also received for a lactic acid level now and then again at 4 am as well as a one time CBC.

## 2021-09-12 NOTE — PROGRESS NOTES
CVTS    Interim events noted. Still maintain pt is not a candidate for cabg. However, if PCI is going to proceed and access is being contemplated, keep in mind that Impella could be moved to alternate site (we could help with axillary 5.5 if necessary) for slow weaning.      Rosetta Prado MD  9/12/2021  11:30 AM

## 2021-09-12 NOTE — PROGRESS NOTES
[address]   Progress Note  Cardiology    CC: throat pain    HPI: No throat/chest pain. No leg pain. Past Medical History   has a past medical history of Arthritis, CAD (coronary artery disease), Cholelithiasis, COPD (chronic obstructive pulmonary disease) ([facility] [address] CVA (cerebral vascular accident) ([facility] [address] Diabetes mellitus ([facility] [address] Esophageal stricture, Falls, GERD (gastroesophageal reflux disease), GI bleed, Hiatal hernia, Hyperlipidemia, Hypertension, Nephrolithiasis, Nodule of kidney, Parotid nodule, Pulmonary nodule, PVD (peripheral vascular disease) ([facility] [address] Rib fractures, and Vertigo. Past Surgical History   has a past surgical history that includes Coronary angioplasty with stent (2001); femoral bypass (Bilateral); Upper gastrointestinal endoscopy (N/A, 02/19/2021); other surgical history; Femur fracture surgery (Left, 03/05/2021); Esophagus dilation (2016); and Cataract removal (Bilateral, 2010). Social History   reports that he has been smoking cigarettes. He has a 61.00 pack-year smoking history. He has never used smokeless tobacco. He reports that he does not drink alcohol and does not use drugs. Family History  family history is not on file. Medications  Prior to Admission medications    Medication Sig Start Date End Date Taking? Authorizing Provider   lidocaine (LIDODERM) 5 % Place 1 patch onto the skin daily 12 hours on, 12 hours off. 8/14/21   GAMAL Hong   tiotropium (SPIRIVA RESPIMAT) 2.5 MCG/ACT AERS inhaler Inhale 2 puffs into the lungs daily 5/24/21   Bon Betts MD   [facility].  Devices ([facility]) MISC 1 Device by Does not apply route as needed (Status post  hip surgery) 3/25/21   Shayy Gallegos MD   potassium chloride (KLOR-CON M) 20 MEQ extended release tablet Take 1 tablet by mouth daily for 7 days 3/9/21 3/16/21  Everlean Fleischer, MD   enoxaparin (LOVENOX) 40 MG/0.4ML injection Inject 0.4 mLs into the skin daily 3/7/21   GAMAL Wang albuterol sulfate HFA (PROAIR HFA) 108 (90 Base) MCG/ACT inhaler Inhale 2 puffs into the lungs every 6 hours as needed for Wheezing 4/9/18   Rui Garcia MD   acetaminophen (TYLENOL) 325 MG tablet Take 2 tablets by mouth every 4 hours as needed for Pain or Fever Maximum dose of acetaminophen is 4000 mg from all sources in 24 hours. 10/3/17   Yenifer Magallanes MD   ipratropium-albuterol (DUONEB) 0.5-2.5 (3) MG/3ML SOLN nebulizer solution Inhale 3 mLs into the lungs 3 times daily 10/3/17   Yenifer Magallanes MD   mometasone-formoterol [facility]) 200-5 MCG/ACT inhaler Inhale 2 puffs into the lungs 2 times daily Substituted for Budesonide-Formoterol (SYMBICORT).  10/3/17   Yenifer Magallanes MD   atorvastatin (LIPITOR) 80 MG tablet Take 1 tablet by mouth daily 10/3/17   Yenifer Magallanes MD   amLODIPine (NORVASC) 10 MG tablet Take 0.5 tablets by mouth nightly Note: new lower dose 10/3/17   Yenifer Magallanes MD   furosemide (LASIX) 40 MG tablet Take 1 tablet by mouth daily 10/3/17   Yenifer Magallanes MD   [facility]) 60 MG CP24 extended release capsule Take 1 capsule by mouth daily 10/3/17   Yenifer Magallanes MD   insulin glargine (LANTUS SOLOSTAR) 100 UNIT/ML injection pen Inject 20 Units into the skin nightly 10/3/17   Yenifer Magallanes MD   insulin lispro (HUMALOG KWIKPEN) 100 UNIT/ML pen Inject 0-6 Units into the skin 3 times daily (before meals) Low Dose Correction Algorithm: BS  No Insulin, -199 1 Unit, 200-249 2 Units, 250-299 3 Units, 300-349 4 Units, 350-399 5 Units, 400 and above give 6 Units 10/3/17   Yenifer Magallanes MD   carvedilol (COREG) 6.25 MG tablet Take 6.25 mg by mouth 2 times daily    Historical Provider, MD   aspirin 81 MG chewable tablet Take 81 mg by mouth daily    Historical Provider, MD   lisinopril (PRINIVIL;ZESTRIL) 40 MG tablet Take 40 mg by mouth daily    Historical Provider, MD   pantoprazole sodium (PROTONIX) 40 MG PACK packet Take 40 mg by mouth every morning (before breakfast)    Historical Provider, MD   SITagliptin-MetFORMIN HCl ER (JANUMET XR) 100-1000 MG TB24 Take 1 tablet by mouth daily    Historical Provider, MD   albuterol sulfate HFA (PROVENTIL HFA) 108 (90 BASE) MCG/ACT inhaler Inhale 2 puffs into the lungs every 4 hours as needed for Wheezing or Shortness of Breath With spacer (and mask if indicated). Thanks. 12/24/16 3/29/17  Jasvir Del Angel MD   clopidogrel (PLAVIX) 75 MG tablet Take 75 mg by mouth daily    Historical Provider, MD       Allergies  No known allergies    Review of Systems:   Reviewed. No changes except as noted in HPI and A/P      Lab Results   Component Value Date    WBC 9.0 09/12/2021    HGB 9.3 (L) 09/12/2021    HCT 27.7 (L) 09/12/2021    MCV 81.5 09/12/2021     09/12/2021     Lab Results   Component Value Date    CREATININE 1.2 09/12/2021    BUN 23 (H) 09/12/2021     09/12/2021    K 3.2 (L) 09/12/2021     09/12/2021    CO2 30 09/12/2021     Lab Results   Component Value Date    INR 0.99 09/11/2021    PROTIME 11.2 09/11/2021       I reviewed EKGs and radiology imaging. Pertinent findings and changes as described in assessment below. Physical Examination:    BP (!) 171/87   Pulse 64   Temp 99 °F (37.2 °C) (Bladder)   Resp 30   Ht 5' 7\" (1.702 m)   Wt 167 lb (75.8 kg)   SpO2 96%   BMI 26.16 kg/m²      General Appearance:  Alert, no distress, appears stated age   Head:  Normocephalic, without obvious abnormality, atraumatic   Eyes:  PERRL, conjunctiva/corneas clear         Nose: Nares normal, no drainage or sinus tenderness   Throat: Lips, mucosa, and tongue normal   Neck: Supple, symmetrical, trachea midline, no adenopathy         Lungs:   Clear to auscultation bilaterally    Chest Wall:  No tenderness or deformity   Heart:  Regular rate and rhythm, S1, S2 normal, no murmur, rub or gallop   Abdomen:   Soft, non tender, normal bowel sounds                Extremities: Impella in place left groin. Left leg cool. .    Pulses: Unable palpate left pedal pulses. + left popliteal pulse by doppler   Skin: Skin color, texture, turgor normal, no rashes    Pysch: Normal mood and affect   Neurologic: Normal gross motor and sensory exam.        Assessment:    STEMI   CAD - severe multivessel. Cardiogenic shock - improved post impella  ICMP - severe reduced LVEF  PAF - unstable, currently NSR  [place] 4 - possible ppt by PAF in setting severe CAD. Currently pain free  HTN - poor control   HLD  Hypokalemia   PAD - blood flow compromised to left leg by Impella. PVS note reviewed. At this time benefit Impella outweighs risk. May need to reconsider if progressive ischemia left leg. Plan  Oral amio  Full dose heparin drip - adjust according to PTT  stop integrelin and resume Brilinta   ASA, statin, BBlk, ACE  Replete K  Increase lisinopril   Increase coreg   Impella x 48hrs provided leg remains ok from vascular standpoint  Dr Fernie Rose to discuss percutaneous intervention option w/ patient family in am    30 min critical care including management of Impella LV assist device.        Lei Lacey MD, 9/12/2021 12:37 PM

## 2021-09-12 NOTE — PROGRESS NOTES
Call received from lab due to critical troponin level of 1.59. Call request put out for cardiology. Spoke with Dr. Kaia Ashley, orders received for stat EKG with follow-ups in the near future. Currently no EKG changes, pt does not complain of chest pain or any general discomfort. Will continue to monitor.

## 2021-09-12 NOTE — PROGRESS NOTES
Call placed with cardiology for pt hypertension ( on cuff, 184 on impella readout)    Call received from Dr. Gasper Seip, updated on pt situation, order received for one time dose of 5 mg amlodipine.

## 2021-09-13 PROBLEM — I21.3 STEMI (ST ELEVATION MYOCARDIAL INFARCTION) (HCC): Status: ACTIVE | Noted: 2021-01-01

## 2021-09-13 NOTE — PLAN OF CARE
Problem: Falls - Risk of:  Goal: Will remain free from falls  Description: Will remain free from falls  Outcome: Ongoing  Goal: Absence of physical injury  Description: Absence of physical injury  Outcome: Ongoing     Problem: Skin Integrity:  Goal: Will show no infection signs and symptoms  Description: Will show no infection signs and symptoms  Outcome: Ongoing  Goal: Absence of new skin breakdown  Description: Absence of new skin breakdown  Outcome: Ongoing     Problem: Discharge Planning:  Goal: Discharged to appropriate level of care  Description: Discharged to appropriate level of care  Outcome: Ongoing     Problem: Pain - Acute:  Goal: Pain level will decrease  Description: Pain level will decrease  Outcome: Ongoing     Problem: Fluid Volume - Imbalance:  Goal: Will show no signs and symptoms of excessive bleeding  Description: Will show no signs and symptoms of excessive bleeding  Outcome: Ongoing  Goal: Absence of imbalanced fluid volume signs and symptoms  Description: Absence of imbalanced fluid volume signs and symptoms  Outcome: Ongoing     Problem: Anxiety:  Goal: Level of anxiety will decrease  Description: Level of anxiety will decrease  Outcome: Ongoing     Problem: Cardiac Output - Decreased:  Goal: Cardiac output within specified parameters  Description: Cardiac output within specified parameters  Outcome: Ongoing     Problem: Serum Glucose Level - Abnormal:  Goal: Ability to maintain appropriate glucose levels will improve  Description: Ability to maintain appropriate glucose levels will improve  Outcome: Ongoing     Problem: Tissue Perfusion - Cardiopulmonary, Altered:  Goal: Circulatory function within specified parameters  Description: Circulatory function within specified parameters  Outcome: Ongoing  Goal: Absence of angina  Description: Absence of angina  Outcome: Ongoing  Goal: Hemodynamic stability will improve  Description: Hemodynamic stability will improve  Outcome: Ongoing Problem: Tissue Perfusion - Peripheral, Altered:  Goal: Absence of hematoma at arterial access site  Description: Absence of hematoma at arterial access site  Outcome: Ongoing  Goal: Circulatory function of lower extremities is within specified parameters  Description: Circulatory function of lower extremities is within specified parameters  Outcome: Ongoing     Problem: Tissue Perfusion - Renal, Altered:  Goal: Electrolytes within specified parameters  Description: Electrolytes within specified parameters  Outcome: Ongoing  Goal: Urine creatinine clearance will be within specified parameters  Description: Urine creatinine clearance will be within specified parameters  Outcome: Ongoing  Goal: Serum creatinine will be within specified parameters  Description: Serum creatinine will be within specified parameters  Outcome: Ongoing  Goal: Ability to achieve a balanced intake and output will improve  Description: Ability to achieve a balanced intake and output will improve  Outcome: Ongoing     Problem: Tobacco Use:  Goal: Will participate in inpatient tobacco-use cessation counseling  Description: Will participate in inpatient tobacco-use cessation counseling  Outcome: Ongoing

## 2021-09-13 NOTE — PROGRESS NOTES
Spoke with Dr. Gladys Sanchez via phone and informed with hospice meeting at this time, plan to d/c impella, patient DNR-CC. Will update MD when plans are arranged.  Mino Albright RN

## 2021-09-13 NOTE — PROGRESS NOTES
Purge fluid changed @ 0600. L groin with impella in place @ 87. No s/s of hematoma. Slight oozing overnight from groin, dressing changed. Old drainage noted on dressing. LLE pulses noted with doppler throughout shift (popliteal, post tib, & pedal). BLE cool to touch. RLE doppler pulses more audible. Urine geovanny to yellow throughout shift.

## 2021-09-13 NOTE — CARE COORDINATION
This writer spoke with SHEELA Small) and Sandra Segura  (palliative care). Divine Tanner has spoken with patient's son. Patient is DNR-CC. Impella will be removed later today. Hospice referral has been made. Will likely be transferred to [place] [id number] later today. CM available to assist if needed, but will remain in periphery for now.     Olga Ann RN

## 2021-09-13 NOTE — PROGRESS NOTES
Pt restless, removing gown, pulled out PIVs. Darlyn Hitch that he is going home. Disoriented to place, time, and situation. Medicated with 1mg ativan ivp and changed all linens and gowns.  Jose Carlos Rodriguez RN

## 2021-09-13 NOTE — CONSULTS
Palliative Care Initial Note  Palliative Care Admit date: 9/13/21  Reason for c/s:  GOC, Code status, Family support, Sx mgt    Advance Directives: Although we do not have copies of pts AD's, pts son Shirley Leyva \"Real\" Ivis s) states that he holds pts healthcare poa and will provide copies. Pt currently has a full code status. In speaking w/ Real, he stated that pt, during this admission, \"said clearly that he doesn't want any of that stuff after seeing what his wife went through. \"   Writer explained the components of resuscitation and Delmanikhil Keene was aware, even before pt stated his wishes here, that pt did not want to be resuscitated. Plan of care/goals:  Spoke w/ Real via phone who reports having had earlier d/w Cards and understands pts \"condition is bad, his heart is around 15%. \"  Delma Jassi expressed his wish that pt be \"kept comfortable\" and is very receptive to having hospice involved in his care. We reviewed plan to d/c pts heparin then removal of impella later today. We also discussed the potential for pt to be transferred to the hospice IPU provided he can survive transport. Family made aware of hospice provider options. Son preferred that writer call ref to [facility] for their [facility] given that was the best [place] [address] location of the area options (done). If pt should stabilized at the [address] has verb'd that [facility] is a preferred [facility]. Have d/w shift RN    Social/Spiritual:  PTA, pt lived @ home though Delma Keene reports that he \"couldn't do anything for himself, he just sat in his chair. \"  Pt was smoking \"three pks of cigarettes/day; we knew this would happen eventually. \"   Delma Keene is communicating closely w/ his sister, Flower Israel, to keep her apprised of plan of care. Real's brother is incarcerated. Plan:  Code status amended to better reflect pts wish for comfort.   HOC will f/u w/ Real to schedule a mtg for today        Reason for consult:  _X_ Advance Care Planning  _X_ Transition of Care Planning  ___ Psychosocial/Spiritual Support  ___ Symptom Management                                                                                                                                        Angelia Serna RN

## 2021-09-13 NOTE — PROGRESS NOTES
Intensive Care Progress Note    Patient: Erna Torres MRN: [id number]  Date of  Admission: 9/11/2021   YOB: 1945  Age: 66 y.o. Sex: male    Unit: [facility] C2 CVU  Room/Bed: 2931/1145-50 Attending Physician: No att. providers found   Admitting Physician: Ashley Rai        Chief Complaint   Patient presents with    Chest Pain       called 911 for throat discomfort. EMS gave 324mg of ASA and nitro x 1      ICU admission, status post myocardial infarction status post Impella placement     History Obtained From   patient, electronic medical record     History of Present Illness   History of present illness this is a 64-year-old gentleman with a history of COPD CAD and diabetes and hypertension who comes to the emergency room because of initial throat discomfort however was found to have issues with T wave abnormality on EKG. Patient was found to have atrial fibrillation with RVR. Patient started having issues with throat discomfort and this progressively was worked up. Patient was found to be having ST elevation in the lateral leads and code STEMI was initiated. Patient was taken to the Cath Lab and found to have multiple vessel CAD. Patient was started on heparin and Integrilin drips. And started on vasopressor and inotropic support. Patient was moved to the ICU with an [facility]. CT surgery has been consulted along with vascular surgery as the patient's left leg does have some issues with vascular flow distally        Subjective:    Patient remains on Impella  CT surgery, cardiology and vascular surgery have seen the patient    Cardiology is seen the patient this morning, talked with the son  Plan is to approach palliative care and hospice  Consult was placed    ROS:A comprehensive review of systems was negative except for: above    Objective:          Intake and Output:   Current Shift:   09/13 0701 - 09/13 1500  In: -   Out: 142 [Urine:142]  Last three shifts:   09/12 0701 - 09/13 0700  In: 1074 [I.V.:1074]  Out: 1330 [Urine:1330]    Vent settings for last 24 hours:  [unfilled]    Hemodynamic parameters for last 24 hours:  [unfilled]    Physical Exam:   Patient Vitals for the past 24 hrs:   BP Temp Temp src Pulse Resp SpO2   09/13/21 0900 (!) 156/77 -- -- 59 14 94 %   09/13/21 0852 -- -- -- 59 14 94 %   09/13/21 0800 (!) 149/77 -- -- 62 (!) 33 95 %   09/13/21 0749 (!) 155/89 99 °F (37.2 °C) Bladder 65 18 95 %   09/13/21 0700 (!) 153/90 -- -- 62 24 91 %   09/13/21 0600 (!) 158/81 -- -- 63 14 93 %   09/13/21 0500 (!) 149/77 -- -- 58 15 91 %   09/13/21 0400 (!) 155/79 99 °F (37.2 °C) Bladder 58 29 91 %   09/13/21 0300 (!) 152/94 -- -- 63 27 92 %   09/13/21 0200 (!) 156/85 -- -- 62 24 94 %   09/13/21 0100 (!) 158/74 -- -- 64 22 96 %   09/13/21 0000 (!) 164/83 99.1 °F (37.3 °C) Bladder 66 21 94 %   09/12/21 2300 (!) 162/82 -- -- 64 12 93 %   09/12/21 2200 (!) 159/89 -- -- 59 14 94 %   09/12/21 2100 (!) 168/88 -- -- 60 14 95 %   09/12/21 2000 (!) 159/87 99.6 °F (37.6 °C) Bladder 86 16 97 %   09/12/21 1955 -- -- -- -- -- 95 %   09/12/21 1900 (!) 170/92 -- -- 62 22 96 %   09/12/21 1800 (!) 162/95 -- -- 63 25 100 %   09/12/21 1700 (!) 167/102 -- -- 64 16 100 %   09/12/21 1600 (!) 153/105 98.9 °F (37.2 °C) Bladder 62 30 100 %   09/12/21 1500 (!) 144/91 -- -- 63 20 --   09/12/21 1400 (!) 153/87 -- -- 60 26 --   09/12/21 1300 (!) 148/89 -- -- 62 -- 100 %   09/12/21 1200 (!) 162/89 -- -- 64 16 --   09/12/21 1100 (!) 171/87 99 °F (37.2 °C) Bladder 64 30 96 %   09/12/21 1000 (!) 155/86 -- -- 66 24 --            Physical Exam  Vitals and nursing note reviewed. Constitutional:       General: He is not in acute distress. Appearance: Normal appearance. HENT:      Head: Normocephalic and atraumatic. Right Ear: External ear normal.      Left Ear: External ear normal.      Nose: Nose normal.      Mouth/Throat:      Mouth: Mucous membranes are moist.      Pharynx: Oropharynx is clear.    Eyes: General:         Right eye: No discharge. Left eye: No discharge. Extraocular Movements: Extraocular movements intact. Conjunctiva/sclera: Conjunctivae normal.      Pupils: Pupils are equal, round, and reactive to light. Cardiovascular:      Rate and Rhythm: Normal rate and regular rhythm. Pulses: Normal pulses. Heart sounds: No murmur heard. Pulmonary:      Effort: No respiratory distress. Breath sounds: No stridor. No rhonchi or rales. Comments: Decreased breath sounds bilaterally  Chest:      Chest wall: No tenderness. Abdominal:      General: Bowel sounds are normal. There is no distension. Palpations: Abdomen is soft. There is no mass. Tenderness: There is no abdominal tenderness. Hernia: No hernia is present. Musculoskeletal:         General: No swelling. Normal range of motion. Cervical back: Normal range of motion. No rigidity. Right lower leg: Edema present. Left lower leg: Edema present. Skin:     General: Skin is warm and dry. Coloration: Skin is not jaundiced or pale. Comments: Left lower extremity is cool to touch compared to the right side   Neurological:      Mental Status: He is alert. He is disoriented. Labs:   Recent Labs     09/11/21  2342 09/12/21  0550 09/13/21  0600   WBC 9.1 9.0 8.2   HGB 9.7* 9.3* 9.0*   HCT 29.5* 27.7* 26.9*    142 131*      Recent Labs     09/11/21  1013 09/12/21  0550 09/13/21  0600    143 143   K 3.2* 3.2* 3.7    102 106   CO2 28 30 30   BUN 29* 23* 17   GLUCOSE 171* 151* 132*       Additional labs:    Radiology, images personally reviewed.   Yes  XR CHEST PORTABLE [[id number]] Collected: 09/12/21 0756      Order Status: Completed Updated: 09/12/21 0803     Narrative:       EXAMINATION:   ONE XRAY VIEW OF THE CHEST     9/12/2021 6:01 am     COMPARISON:   09/11/2021     HISTORY:   ORDERING SYSTEM PROVIDED HISTORY: CHF   TECHNOLOGIST PROVIDED HISTORY: regurgitation. Mild aortic regurgitation. Mild tricuspid regurgitation. The IVC is dilated in size (>2.1 cm) and collapses <50% with respiration   consistent with markedly elevated right atrial pressures (15 mmHg) . There is a small circumferential pericardial effusion noted. Cardiology talked with the son and have decided to go and approach a palliative/hospice approach  M[place]on [place] will be taken out later today  We will stop heparin drip  Start comfort measures       CHF  Lasix was given with some improvement in wheezing 2 days ago  We will give as needed       Vascular issues  Left leg not having dopplerable pulses  Vascular surgery has been consulted, to follow     Pulmonary  Having apneic episodes, more consistent with Cheyne-Landis respiration  If the patient has significant apneic episodes from central/Cheyne-Landis respiration would suggest doing a CPAP of 10 cm of water pressure  It is contraindicated to place the patient on a BiPAP therapy or AVAPS therapy    We will continue to follow     COPD  On Symbicort and Spiriva  Albuterol as needed     Venous blood gas shows normal pH with a PCO2 of 51 which is probably normal for venous gas  Will follow if necessary        GI    If patient wants to eat will allow    History of esophageal stricture and GERD  Protonix IV          Endocrine  Diabetes  Monitor blood sugars  Blood sugar seem to be controlled  We will add sliding scale insulin if needed        Prophylaxis  Patient is on heparin drip  on Protonix IV     Hematology  White count is normal  H&H is stable we will follow           Lines  Patient has a left IJ placed 9/10/2021  Patient has Marlon Jassi placed in the left femoral artery 9/10/2021     CODE STATUS will need addressing  Hospice has been consulted, will change to palliative care approach           Discussed with nursing, respiratory, dietitian, pharmacy              Critical Care Services Provided:   This patient had a critical illness or injury that acutely impaired one or more vital organ systems such that there was a high probability of imminent or life-threatening deterioration in the patient's condition. This required high complexity decision-making to assess, manipulate, and support vital system function(s) to treat single or multiple vital organ system failure and/or to prevent further life-threatening deterioration of the patient's condition. Total attending physician time, excluding unbundled procedures, spent at the bedside, and away from the bedside but on the unit or floor, reviewing laboratory test results, discussing care with medical staff, and discussing care with family when the patient was unable or incompetent to participate:   Critical Care Time (Minutes) # 35   (Discontinuous)                     Lorenda Olszewski, MD [facility]  Pulmonary, Critical Care and Sleep Medicine  Office : [phone]    Please note that some or all of this record was generated using voice recognition software. If there are any questions about the content of this document, please contact the author as some errors in transcription may have occurred.

## 2021-09-13 NOTE — FLOWSHEET NOTE
Daughter and Son bedside, conversations about 'whether we should leave or stay? Is it odd for us to stay the whole time and wait for him to take his last breath?'    I validated EOL concerns/grief, decision making difficulties. Patient has mostly kept to himself 'sitting in a chair all day by himself smoking 3 packs of cigarettes a day,' per son Royann Schlatter. I asked about what patient values were, assisted with decision making about bedside presence for family. Pt has 3 children total, 1 is currently incarcerated. Pt had asked to talk with  earlier when I was rounding, but was not responsive when I visited this time. Informed family I would be in tomorrow and would stop by for support and conversation if patient was up for it. Ijeoma Cantrell  [phone]       09/13/21 1708   Encounter Summary   Services provided to: Family  (Pts Daughter)   Referral/Consult From: [facility];Family members   Continue Visiting   (9/13: re-visit, pt said earlier 'want to talk to you,' suppo)   Complexity of Encounter High   Length of Encounter 30 minutes   Spiritual Assessment Completed Yes   Grief and Life Adjustment   Type Grief and loss   Assessment Tearful;Grieving;Helplessness   Intervention Nurtured hope; Active listening;Discussed belief system/Sikhism practices/edwin;Discussed relationship with God;Discussed meaning/purpose   Outcome Expressed gratitude; Tearful;Grieving

## 2021-09-13 NOTE — PROGRESS NOTES
MD order to d/c systemic heparin. D/cd gtt and purge fluid.  Replaced purge fluid with Alejandro Cuellar RN

## 2021-09-13 NOTE — PROGRESS NOTES
Met with patient's two of three children-Seth \"sundar\" and Tara Cortes to discuss hospice philosophy and services. Other son is incarcerated. Impella still in place. Cardiology will be removing sometime today. Would recommend comfort meds in place prior to this:    Ativan 0.5 mg-1 mg every 2 hours prn anxiety/dyspnea. Morphine 2-4 mg every 1 hour prn pain/dyspnea  Atropine 1% opth 2 drops SL every 4 hours prn terminal secretions  Zofran 4 mg IV every 4 hours prn nausea/vomiting. Would recommend giving ativan/morphine prior to taking out. Hospice will follow up tomorrow. Plan will be to get to [facility] if patient stable for transport and bed available versus partnering. Discussed with HANNY, JUICE [facility], LUIS ARMANDO Gagnon. Neto Morrison RN, [address], [address] [place] [address]   O: [phone]  C: [phone]  F: [phone]   Main & Referrals: [phone]   [email or web address]

## 2021-09-13 NOTE — PROGRESS NOTES
Vascular    L Foot pink with good cap refill. Monophasic DP and PT pulses. No overt ischemia. Impella removal when able.

## 2021-09-13 NOTE — PROGRESS NOTES
[facility]   Daily Cardiovascular Progress Note    Admit Date: 9/11/2021    Chief complaint: Jaw pain  HPI:     Patient denies jaw pain, no shortness of breath, history is very limited from patient as he is confused. History is mainly per chart review. I also spoke to patient's son by phone to discuss history.       Medications/Labs all Reviewed:  Patient Active Problem List   Diagnosis    Arthritis    [facility] mellitus ([facility]    Hypertension    Hyperlipidemia    Ischemic stroke ([facility]    Acute on chronic combined systolic and diastolic congestive heart failure (HCC)    COPD (chronic obstructive pulmonary disease) ([facility])    Tobacco use    GI bleeding    Pulmonary nodule    CAD (coronary artery disease)    Closed nondisplaced intertrochanteric fracture of left femur ([facility])    Hypokalemia    Leukocytosis    Gastroesophageal reflux disease    ST elevation myocardial infarction (STEMI) ([facility])       Medications:  lisinopril (PRINIVIL;ZESTRIL) tablet 20 mg, BID  carvedilol (COREG) tablet 6.25 mg, BID WC  fentaNYL (SUBLIMAZE) injection 50 mcg, Once  perflutren lipid microspheres (DEFINITY) injection 1.65 mg, ONCE PRN  heparin (porcine) injection 4,000 Units, PRN  heparin (porcine) injection 2,000 Units, PRN  heparin (porcine) 25,000 Units in dextrose 5 % 500 mL infusion (FOR IMPELLA PURGE), Continuous  pantoprazole (PROTONIX) injection 40 mg, Daily  budesonide-formoterol (SYMBICORT) 160-4.5 MCG/ACT inhaler 2 puff, BID  tiotropium (SPIRIVA RESPIMAT) 2.5 MCG/ACT inhaler 2 puff, Daily  albuterol (PROVENTIL) nebulizer solution 2.5 mg, Q6H PRN  aspirin EC tablet 81 mg, Daily  ticagrelor (BRILINTA) tablet 90 mg, BID  atorvastatin (LIPITOR) tablet 80 mg, Nightly  amiodarone (CORDARONE) tablet 400 mg, BID  potassium chloride (KLOR-CON M) extended release tablet 20 mEq, Daily  heparin 25,000 units in dextrose 5% 250 mL (premix) infusion, Continuous  potassium chloride 20 mEq/50 mL IVPB [employer]), PRN           PHYSICAL EXAM   BP (!) 149/77   Pulse 62   Temp 99 °F (37.2 °C) (Bladder)   Resp (!) 33   Ht 5' 7\" (1.702 m)   Wt 167 lb (75.8 kg)   SpO2 95%   BMI 26.16 kg/m²    Vitals:    09/13/21 0600 09/13/21 0700 09/13/21 0749 09/13/21 0800   BP: (!) 158/81 (!) 153/90 (!) 155/89 (!) 149/77   Pulse: 63 62 65 62   Resp: 14 24 18 (!) 33   Temp:   99 °F (37.2 °C)    TempSrc:   Bladder    SpO2: 93% 91% 95% 95%   Weight:       Height:             Intake/Output Summary (Last 24 hours) at 9/13/2021 0844  Last data filed at 9/13/2021 7862  Gross per 24 hour   Intake 1074 ml   Output 1372 ml   Net -298 ml     Wt Readings from Last 3 Encounters:   09/11/21 167 lb (75.8 kg)   08/14/21 167 lb (75.8 kg)   06/22/21 167 lb (75.8 kg)         Gen: Patient in NAD, resting comfortably, lying flat, appears to be confused, lethargic  Neck: Elevated JVP  Respiratory: CTAB no WRR  Chest: normal without deformity  Cardiovascular:RRR, S1S2, 2 out of 6 systolic murmur  Abdomen: Soft,  Extremities: +2 bilateral lower extremity edema  Neurological/Psychiatric:  AxO x 1  Skin: Cool      Labs:  CBC:   Recent Labs     09/11/21  2342 09/12/21  0550 09/13/21  0600   WBC 9.1 9.0 8.2   HGB 9.7* 9.3* 9.0*   HCT 29.5* 27.7* 26.9*   MCV 82.4 81.5 81.4    142 131*     BMP:   Recent Labs     09/11/21  0100 09/11/21  0211 09/11/21  1013 09/12/21  0550 09/13/21  0600     --  139 143 143   K 3.4*  --  3.2* 3.2* 3.7   CL 99  --  102 102 106   CO2 31  --  28 30 30   PHOS  --   --   --  2.7  --    BUN 24*  --  29* 23* 17   CREATININE 1.4* 1.7* 1.4* 1.2 1.0     MG:    Recent Labs     09/11/21  0100 09/11/21  1013 09/12/21  0550   MG 2.00 1.90 1.70*      PT/INR:   Recent Labs     09/11/21  0100   PROTIME 11.2   INR 0.99     APTT:   Recent Labs     09/12/21  2200 09/13/21  0202 09/13/21  0606   APTT 82.3* 54.4* 55.6*     Cardiac Enzymes:   Recent Labs     09/11/21  0100 09/12/21  0550 09/13/21  0600   TROPONINI 0.36* 1.59* 1.61*       Cardiac Studies:    Echo     Summary   An Impella appears well seated at a depth of 4.7 cm. Spectral interference   from the LVAD reduces sensitivity of Doppler. Left ventricular systolic function is severely reduced with a visually   estimated ejection fraction of <20 %. EF estimated by 3D at 13 %. The left ventricle is severely dilated in size with mild posterior   hypertrophy. Global akinesis with some minimal preserved function of the mid and basal   anterior, anterolateral, and inferolateral wall segments. Grade II diastolic dysfunction with elevated LV pressure. Right ventricular systolic function visually appears normal, but TAPSE and S   velocity indicate reduced function. The left atrium appears moderately enlarged. Moderate mitral regurgitation. Mild aortic regurgitation. Mild tricuspid regurgitation. The IVC is dilated in size (>2.1 cm) and collapses <50% with respiration   consistent with markedly elevated right atrial pressures (15 mmHg) . There is a small circumferential pericardial effusion noted. I have reviewed labs and imaging/xray/diagnostic testing in this note.     Assessment and Plan       Patient Active Problem List   Diagnosis    Arthritis    Diabetes mellitus ([address]    Hypertension    Hyperlipidemia    Ischemic stroke ([address]    Acute on chronic combined systolic and diastolic congestive heart failure (HCC)    COPD (chronic obstructive pulmonary disease) (HCC)    Tobacco use    GI bleeding    Pulmonary nodule    CAD (coronary artery disease)    Closed nondisplaced intertrochanteric fracture of left femur ([facility])    Hypokalemia    Leukocytosis    Gastroesophageal reflux disease    ST elevation myocardial infarction (STEMI) ([facility])     Acute lateral STEMI, cardiogenic shock, status post Impella placement, care coordinated with cardiac surgery, pulmonary/ICU and vascular surgery services as well as bedside nursing, case/situation discussed with patient's son by phone, consensus is for palliative care and hospice as per patient's family's wishes, we will place a consult for that and plan on Impella removal later today. Critical care x35 minutes    Thank you for allowing me to participate in the care of your patient. Please call me with any questions [phone].       Lupe Curtis MD, [facility]   Interventional Cardiologist  Jesús   [phone] [facility]  [phone] [address] [place] [address]  9/13/2021 8:44 AM

## 2021-09-13 NOTE — PROGRESS NOTES
Aware of family's decision to pursue palliative care and hospice. Cardiology planning on removing Impella today. Will sign off. Please call with questions or concerns.

## 2021-09-13 NOTE — PLAN OF CARE
Problem: Falls - Risk of:  Goal: Will remain free from falls  Description: Will remain free from falls  9/13/2021 1250 by Darron Tyler RN  Outcome: Ongoing  9/13/2021 1151 by Darron Tyler RN  Outcome: Ongoing  Goal: Absence of physical injury  Description: Absence of physical injury  9/13/2021 1250 by Darron Tyler RN  Outcome: Ongoing  9/13/2021 1151 by Darron Tyler RN  Outcome: Ongoing     Problem: Skin Integrity:  Goal: Will show no infection signs and symptoms  Description: Will show no infection signs and symptoms  9/13/2021 1250 by Darron Tyler RN  Outcome: Ongoing  9/13/2021 1151 by Darron Tyler RN  Outcome: Ongoing  Goal: Absence of new skin breakdown  Description: Absence of new skin breakdown  9/13/2021 1250 by Darron Tyler RN  Outcome: Ongoing  9/13/2021 1151 by Darron Tyler RN  Outcome: Ongoing  Goal: Risk for impaired skin integrity will decrease  Description: Risk for impaired skin integrity will decrease  Outcome: Ongoing     Problem: Discharge Planning:  Goal: Discharged to appropriate level of care  Description: Discharged to appropriate level of care  9/13/2021 1250 by Darron Tyler RN  Outcome: Ongoing  9/13/2021 1151 by Darron Tyler RN  Outcome: Ongoing     Problem: Pain - Acute:  Goal: Pain level will decrease  Description: Pain level will decrease  9/13/2021 1250 by Darron Tyler RN  Outcome: Ongoing  9/13/2021 1151 by Darron Tyler RN  Outcome: Ongoing     Problem: Fluid Volume - Imbalance:  Goal: Will show no signs and symptoms of excessive bleeding  Description: Will show no signs and symptoms of excessive bleeding  9/13/2021 1250 by Darron Tyler RN  Outcome: Ongoing  9/13/2021 1151 by Darron Tyler RN  Outcome: Ongoing  Goal: Absence of imbalanced fluid volume signs and symptoms  Description: Absence of imbalanced fluid volume signs and symptoms  9/13/2021 1250 by Darron Tyler RN  Outcome: Ongoing  9/13/2021 1151 by Zachary Thakur RN  Outcome: Ongoing     Problem: Anxiety:  Goal: Level of anxiety will decrease  Description: Level of anxiety will decrease  9/13/2021 1250 by Zachary Thakur RN  Outcome: Ongoing  9/13/2021 1151 by Zachary Thakur RN  Outcome: Ongoing     Problem: Cardiac Output - Decreased:  Goal: Cardiac output within specified parameters  Description: Cardiac output within specified parameters  9/13/2021 1250 by Zachary Thakur RN  Outcome: Ongoing  9/13/2021 1151 by Zachary Thakur RN  Outcome: Ongoing     Problem: Serum Glucose Level - Abnormal:  Goal: Ability to maintain appropriate glucose levels will improve  Description: Ability to maintain appropriate glucose levels will improve  9/13/2021 1250 by Zachary Thakur RN  Outcome: Ongoing  9/13/2021 1151 by Zachary Thakur RN  Outcome: Ongoing     Problem: Tissue Perfusion - Cardiopulmonary, Altered:  Goal: Circulatory function within specified parameters  Description: Circulatory function within specified parameters  9/13/2021 1250 by Zachary Thakur RN  Outcome: Ongoing  9/13/2021 1151 by Zachary Thakur RN  Outcome: Ongoing  Goal: Absence of angina  Description: Absence of angina  9/13/2021 1250 by Zachary Thakur RN  Outcome: Ongoing  9/13/2021 1151 by Zachary Thakur RN  Outcome: Ongoing  Goal: Hemodynamic stability will improve  Description: Hemodynamic stability will improve  9/13/2021 1250 by Zachary Thakur RN  Outcome: Ongoing  9/13/2021 1151 by Zachary Thakur RN  Outcome: Ongoing     Problem: Tissue Perfusion - Peripheral, Altered:  Goal: Absence of hematoma at arterial access site  Description: Absence of hematoma at arterial access site  9/13/2021 1250 by Zachary Thakur RN  Outcome: Ongoing  9/13/2021 1151 by Zachary Thakur RN  Outcome: Ongoing  Goal: Circulatory function of lower extremities is within specified parameters  Description: Circulatory function of lower extremities is within specified parameters  9/13/2021 1250 by Geeta Long RN  Outcome: Ongoing  9/13/2021 1151 by Geeta Long RN  Outcome: Ongoing     Problem: Coping:  Goal: Ability to participate in care decisions during the dying process will improve  Description: Ability to participate in care decisions during the dying process will improve  Outcome: Ongoing  Goal: Family's ability to cope with current situation will improve  Description: Family's ability to cope with current situation will improve  Outcome: Ongoing     Problem: Health Behavior:  Goal: Identification of resources available to assist in meeting health care needs will improve  Description: Identification of resources available to assist in meeting health care needs will improve  Outcome: Ongoing     Problem: Physical Regulation:  Goal: Complications related to the disease process, condition or treatment will be avoided or minimized  Description: Complications related to the disease process, condition or treatment will be avoided or minimized  Outcome: Ongoing     Problem: Respiratory:  Goal: Verbalizations of increased ease of respirations will increase  Description: Verbalizations of increased ease of respirations will increase  Outcome: Ongoing     Problem: Role Relationship:  Goal: The number of positive interactions the caregiver has with the patient will increase  Description: The number of positive interactions the caregiver has with the patient will increase  Outcome: Ongoing     Problem: Sensory:  Goal: Expressions of feelings of enhanced comfort will increase  Description: Expressions of feelings of enhanced comfort will increase  Outcome: Ongoing

## 2021-09-13 NOTE — PROGRESS NOTES
Pt anxious and and fearful. Asking me if he is \"just gonna die\" when the impella is removed. Told patient that I am unclear what he will feel when the impella is removed, but informed that he can have morphine and ativan as needed for pain and anxiety.  Medicated pt with 1mg ativan ivp per request. Sana Duran RN

## 2021-09-14 NOTE — CARE COORDINATION
SHEELA BOSE met with Ann Marie Deshpande, who states that she is planning to GIP the Pt today as there are no beds available at the [facility]. Plan will be to move the pt tomorrow as a bed becomes available. Rommel Barlow sending message to MD with update and to request to partner. Will follow for decisions. Addendum 1235pm: This SHEELA BOSE spoke to Rommel Barlow with [address] [facility] who states that she has not received a response from MD at this time. She will follow up with the Pt tomorrow and plan to partner at that time or move to [facility] if bed is available. Rommel Barlow states that she spoke with nursing and they are keeping the Pt comfortable.

## 2021-09-14 NOTE — PROGRESS NOTES
Saw patient this morning and he was stable at [facility] for [facility] Discussed with family and they do not want patient to go to [facility] as it is too far. They are agreeable to partnering patient here at [facility] and moving him to [facility] when bed available and patient stable for transport. At 1035 sent and was read Perfect serve to Dr. Melonie Eisenmenger to see if he is willing to partner patient. Discussed with Kalin in palliative care and JUICE Vazquez. No response to this time. Have sent another Perfect serve to Dr. Yang Irvin but will likely just follow up tomorrow. Updated family and JUICE Vazquez. Margoth Munoz RN, [address], [address] [place] [address]   O: [phone]  C: [phone]  F: [phone]   Main & Referrals: [phone]   [facility]

## 2021-09-14 NOTE — PROGRESS NOTES
Son, Cammie Parisi, and daughter, Nba Perry at bedside. Discussed use of morphine to keep pt comfortable. Real stated, \"we are very happy with the care so far. Everyone has been great\". Pt resting with respiratory rate of 20.

## 2021-09-14 NOTE — H&P
This note is done for administrative purposes only. Hospital medicine was consulted on  for patient w/out admission order but here since .  Admission ordered placed by hospital medicine at the request of Utilization review. Patient was never seen/nor examined by hospital medicine as patient was having Impella pulled during first attempt  and  <10 minutes prior to repeat attempted visit.

## 2021-09-14 NOTE — PROGRESS NOTES
Naresh Lopez called and notified of pt. Given initials ER. Coordinator stated to call with cardiac time of death.

## 2021-09-14 NOTE — PLAN OF CARE
Problem: Falls - Risk of:  Goal: Absence of physical injury  Description: Absence of physical injury  Outcome: Ongoing     Problem: Skin Integrity:  Goal: Will show no infection signs and symptoms  Description: Will show no infection signs and symptoms  Outcome: Ongoing  Goal: Absence of new skin breakdown  Description: Absence of new skin breakdown  Outcome: Ongoing  Goal: Risk for impaired skin integrity will decrease  Description: Risk for impaired skin integrity will decrease  Outcome: Ongoing     Problem: Physical Regulation:  Goal: Complications related to the disease process, condition or treatment will be avoided or minimized  Description: Complications related to the disease process, condition or treatment will be avoided or minimized  Outcome: Ongoing     Problem: Role Relationship:  Goal: The number of positive interactions the caregiver has with the patient will increase  Description: The number of positive interactions the caregiver has with the patient will increase  Outcome: Ongoing

## 2021-09-14 NOTE — PROGRESS NOTES
No heart sounds or lung sounds auscultated by 2 RNs at 1650. Pt asystole on the monitor. Family bedside. [facility] notified.

## 2021-09-14 NOTE — PROGRESS NOTES
[address]   Daily Progress Note    Admit Date:  9/11/2021  HPI:    Chief Complaint   Patient presents with    Chest Pain     called 911 for throat discomfort. EMS gave 324mg of ASA and nitro x 1      Marianne Kaminski presented with throat pain, became tachycardic, found to be in Afib with RVR with LAMBERTO on ECG, code STEMI called. Emergent LHC with MV CAD, severe LV dysfunction with cardiogenic shock, placed Impella and started on pressor support. Deemed not a candidate for surgical revascularization and PCI felt to be high risk. After discussion with patient and family, elected on conservative management and ultimately palliative care/ hospice care. Subjective:  Mr. Evelyn Mckeon lying flat in bed, non-responsive. Family at bedside.      Objective:   Patient Vitals for the past 24 hrs:   BP Temp Temp src Pulse Resp SpO2   09/14/21 1300 -- -- -- 69 21 (!) 44 %   09/14/21 1200 113/61 -- -- 74 -- (!) 51 %   09/14/21 1100 135/62 -- -- 69 -- (!) 87 %   09/14/21 1000 134/60 -- -- 70 -- (!) 87 %   09/14/21 0900 133/62 -- -- 72 -- (!) 88 %   09/14/21 0800 (!) 150/61 97.5 °F (36.4 °C) Bladder 68 25 (!) 84 %   09/14/21 0700 -- -- -- 73 16 91 %   09/14/21 0400 138/61 98.1 °F (36.7 °C) Bladder 59 27 95 %   09/14/21 0300 -- -- -- 68 22 99 %   09/14/21 0200 -- -- -- 63 16 (!) 89 %   09/14/21 0100 -- -- -- 62 16 94 %   09/14/21 0000 (!) 143/68 98.6 °F (37 °C) Bladder 59 29 (!) 82 %   09/13/21 2300 -- -- -- 64 21 92 %   09/13/21 2200 -- -- -- 57 22 91 %   09/13/21 2100 -- -- -- 62 19 99 %   09/13/21 2000 (!) 152/66 98.6 °F (37 °C) Bladder 62 20 96 %   09/13/21 1945 -- -- -- 66 23 92 %   09/13/21 1937 (!) 152/68 98.8 °F (37.1 °C) Bladder 67 21 93 %   09/13/21 1800 134/66 -- -- 70 26 --   09/13/21 1600 (!) 147/67 98.9 °F (37.2 °C) Bladder 64 16 95 %   09/13/21 1550 133/71 -- -- 67 26 91 %   09/13/21 1515 (!) 141/59 -- -- 62 15 93 %   09/13/21 1500 (!) 148/60 -- -- 66 30 94 %   09/13/21 1450 (!) 150/60 -- -- 64 (!) 33 98 % 09/13/21 1440 (!) 148/69 -- -- 61 (!) 34 92 %   09/13/21 1430 (!) 148/63 -- -- 63 (!) 39 94 %   09/13/21 1425 (!) 149/73 -- -- 67 17 94 %   09/13/21 1420 (!) 149/70 -- -- 63 28 96 %   09/13/21 1415 (!) 150/71 -- -- 67 (!) 32 92 %   09/13/21 1410 (!) 151/67 -- -- 65 16 94 %   09/13/21 1405 (!) 162/75 -- -- 64 (!) 31 92 %   09/13/21 1400 (!) 157/75 -- -- 67 20 95 %       Intake/Output Summary (Last 24 hours) at 9/14/2021 1323  Last data filed at 9/14/2021 1045  Gross per 24 hour   Intake 60 ml   Output 955 ml   Net -895 ml     Wt Readings from Last 3 Encounters:   09/11/21 167 lb (75.8 kg)   08/14/21 167 lb (75.8 kg)   06/22/21 167 lb (75.8 kg)         ASSESSMENT:   STEMI  CAD  CARDIOGENIC SHOCK  ISCHEMIC CARDIOMYOPATHY  PAF  HYPERTENSION  PAD        PLAN:  Patient currently enrolled with hospice. Family at bedside - updated and addressed questions. NPO. Nothing further to contribute. Call if questions.        Kusum Tapia, JEN - CNP, 9/14/2021, 1:24 PM  [facility]   [phone]       Telemetry: N/A  NYHA: IV    Physical Exam:  General:  Somnolent, respirations easy/ unlabored, non-responsive   Skin:  Warm and dry, gray  Neck:  JVP normal  Chest:  Course anteriorly  Cardiovascular:  RRR, normal S1S2, + murmur, no g/r  Abdomen:  Soft, nontender, +bowel sounds  Extremities:  No BLE edema  left femoral site without ooze, bruise or hematoma, dressing C,D,I, 1+ pulse      Medications:    lisinopril  20 mg Oral BID    carvedilol  6.25 mg Oral BID WC    fentanNYL  50 mcg IntraMUSCular Once    pantoprazole  40 mg IntraVENous Daily    aspirin  81 mg Oral Daily    ticagrelor  90 mg Oral BID    atorvastatin  80 mg Oral Nightly    amiodarone  400 mg Oral BID    potassium chloride  20 mEq Oral Daily         Lab Data: Lab results independently reviewed and analyzed by myself 9/14/21   CBC:   Recent Labs     09/11/21  2342 09/12/21  0550 09/13/21  0600   WBC 9.1 9.0 8.2   HGB 9.7* 9.3* 9.0*   PLT 154 142 131*     BMP:    Recent Labs     09/12/21  0550 09/13/21  0600    143   K 3.2* 3.7   CO2 30 30   BUN 23* 17   CREATININE 1.2 1.0     INR:  No results for input(s): INR in the last 72 hours.   BNP:    Recent Labs     09/12/21  0550   PROBNP 5,759*     Cardiac Enzymes:   Recent Labs     09/12/21  0550 09/13/21  0600   TROPONINI 1.59* 1.61*     Lipids: No results found for: TRIG, HDL, LDLCALC, LDLDIRECT    Cardiac Imaging:

## 2021-09-29 NOTE — DISCHARGE SUMMARY
Hospital Medicine Discharge Summary    Patient ID: Mari Soriano      Patient's PCP: Marilyn Church Date: 2021     Discharge Date: 2021      This note is done for administrative purposes only. Hospital medicine was consulted on  for patient w/out admission order but here since .  Admission ordered placed by hospital medicine at the request of Utilization review. Patient was never seen/nor examined by hospital medicine as patient was having Impella pulled during first attempt  and  <10 minutes prior to repeat attempted visit.

## 2021-10-05 NOTE — PROGRESS NOTES
Physician Progress Note      PATIENT:               Chris Alba  CSN #:                  [id number]  :                       1945  ADMIT DATE:       2021 12:55 AM  DISCH DATE:        2021 8:11 PM  RESPONDING  PROVIDER #:        Emory Merino [id number] [facility] JEN - CNP          QUERY TEXT:    Pt admitted with STEMI. Pt noted to have need for O2, tachypnea and   eventually apneia, Cheyne-Landis respirations prior to expiring. If possible,   please document in the progress notes and discharge summary if you are   evaluating and/or treating any of the following: The medical record reflects the following:  Risk Factors: COPD, STEMI, [facility]  Clinical Indicators: RR 31, 32, 33, 34, 39 on  6816-1538, SAO2 82%    0000, 84%  0800, Pulm c/s  \"Having apneic episodes, more consistent   with Cheyne-Landis respiration If the patient has significant apneic episodes   from central/Cheyne-Landis respiration would suggest doing a CPAP of 10 cm of   water pressure It is contraindicated to place the patient on a BiPAP therapy   or AVAPS therapy\",  Treatment: O2 4L, pulm c/s, started on Symbicort instead of Dulera and Spiriva   added, albuterol PRN, hospice c/s, monitoring and supportive care    Thank you  Ann Marie Clark RN, CRCR, CCDS  [email or web address]  Options provided:  -- Acute respiratory failure  -- Other - I will add my own diagnosis  -- Disagree - Not applicable / Not valid  -- Disagree - Clinically unable to determine / Unknown  -- Refer to Clinical Documentation Reviewer    PROVIDER RESPONSE TEXT:    Provider disagreed with this query.   Cardiogenic shock, end stage CAD, cardiomyopathy    Query created by: Earnestine Welch on 10/5/2021 10:54 AM      Electronically signed by:  Tk Chapin CNP 10/5/2021 12:43 PM

## 2021-10-07 NOTE — H&P
[address] [facility]  [phone]      Attending Physician: No att. providers found  Reason for Consultation/Chief Complaint: Throat pain    Subjective   History of Present Illness:  Iwona Sapp is a 66 y.o. patient who presented to the hospital with complaints of throat pain, patient began developed throat pain this evening, he called his son who called 46 and he was brought to emergency room, and in route, he was noted be tachycardic with an irregular rhythm with heart rates between 150 and 200 bpm.  When he presented emergency room, he was found to be in atrial fibrillation with rapid ventricular spots, there is ST elevation likely in the lateral leads as well as septal leads, there is diffuse ST depression for which a code STEMI alert was initiated. Patient also noted shortness of breath and increasing lower extremity swelling. History is not obtainable from patient as he is in distress, some history is obtained from patient's son who is at the bedside indicates that patient is nonambulatory due to prior issues with his hip. He also has a difficult social situation, is father helps to care for a son who has a heroin addiction. Per son, patient has not had a history of drug abuse. He does note that he has had past history of coronary stenting, from review of records, appears to had an LAD stent about 20 years ago he is also had peripheral vascular disease with lower extremity stenting. He also is known to be chronically diabetic. He is out of his take his medications. In course evaluation emergency room, troponin levels were found to be elevated at 0.36, proBNP level was 4273. Patient has a longstanding history of cardiac disease, has had stenting as noted above, he follows with [facility] cardiology although has been lost to follow-up over the last 2 or 3 years per his son.   Does have history ischemic heart myopathy with ejection fraction previously of 20 to 25% although follow-up echocardiogram in 2017 showed ejection fraction 40 to 45%. Previously ICD has been discussed with patient however he has declined to have that. Past Medical History:   has a past medical history of Arthritis, CAD (coronary artery disease), Cholelithiasis, COPD (chronic obstructive pulmonary disease) ([address] CVA (cerebral vascular accident) ([address] Diabetes mellitus ([address] Esophageal stricture, Falls, GERD (gastroesophageal reflux disease), GI bleed, Hiatal hernia, Hyperlipidemia, Hypertension, Nephrolithiasis, Nodule of kidney, Parotid nodule, Pulmonary nodule, PVD (peripheral vascular disease) ([address] Rib fractures, and Vertigo. Surgical History:   has a past surgical history that includes Coronary angioplasty with stent (2001); femoral bypass (Bilateral); Upper gastrointestinal endoscopy (N/A, 02/19/2021); other surgical history; Femur fracture surgery (Left, 03/05/2021); Esophagus dilation (2016); and Cataract removal (Bilateral, 2010). Social History:   reports that he has been smoking cigarettes. He has a 61.00 pack-year smoking history. He has never used smokeless tobacco. He reports that he does not drink alcohol and does not use drugs. Home Medications:  Were reviewed and are listed in nursing record and/or below  Prior to Admission medications    Medication Sig Start Date End Date Taking? Authorizing Provider   lidocaine (LIDODERM) 5 % Place 1 patch onto the skin daily 12 hours on, 12 hours off. 8/14/21   GAMAL Valdes   tiotropium (SPIRIVA RESPIMAT) 2.5 MCG/ACT AERS inhaler Inhale 2 puffs into the lungs daily 5/24/21   Benjy Watt MD   [facility]  Devices ([facility]) MISC 1 Device by Does not apply route as needed (Status post  hip surgery) 3/25/21   Ben England MD   potassium chloride (KLOR-CON M) 20 MEQ extended release tablet Take 1 tablet by mouth daily for 7 days 3/9/21 3/16/21  Henry France MD   enoxaparin (LOVENOX) 40 MG/0.4ML injection Inject 0.4 mLs into the skin daily 3/7/21   GAMAL Up   albuterol sulfate HFA (PROAIR HFA) 108 (90 Base) MCG/ACT inhaler Inhale 2 puffs into the lungs every 6 hours as needed for Wheezing 4/9/18   Anam Madden MD   acetaminophen (TYLENOL) 325 MG tablet Take 2 tablets by mouth every 4 hours as needed for Pain or Fever Maximum dose of acetaminophen is 4000 mg from all sources in 24 hours. 10/3/17   Susannah Coulter MD   ipratropium-albuterol (DUONEB) 0.5-2.5 (3) MG/3ML SOLN nebulizer solution Inhale 3 mLs into the lungs 3 times daily 10/3/17   Susannah Coulter MD   mometasone-formoterol [facility]) 200-5 MCG/ACT inhaler Inhale 2 puffs into the lungs 2 times daily Substituted for Budesonide-Formoterol (SYMBICORT).  10/3/17   Susannah Coulter MD   atorvastatin (LIPITOR) 80 MG tablet Take 1 tablet by mouth daily 10/3/17   Susannah Coulter MD   amLODIPine (NORVASC) 10 MG tablet Take 0.5 tablets by mouth nightly Note: new lower dose 10/3/17   Susannah Coulter MD   furosemide (LASIX) 40 MG tablet Take 1 tablet by mouth daily 10/3/17   Susannah Coulter MD   trospium [facility]) 60 MG CP24 extended release capsule Take 1 capsule by mouth daily 10/3/17   Susannah Coulter MD   insulin glargine (LANTUS SOLOSTAR) 100 UNIT/ML injection pen Inject 20 Units into the skin nightly 10/3/17   Susannah Coulter MD   insulin lispro (HUMALOG KWIKPEN) 100 UNIT/ML pen Inject 0-6 Units into the skin 3 times daily (before meals) Low Dose Correction Algorithm: BS  No Insulin, -199 1 Unit, 200-249 2 Units, 250-299 3 Units, 300-349 4 Units, 350-399 5 Units, 400 and above give 6 Units 10/3/17   Susannah Coulter MD   carvedilol (COREG) 6.25 MG tablet Take 6.25 mg by mouth 2 times daily    Historical Provider, MD   aspirin 81 MG chewable tablet Take 81 mg by mouth daily    Historical Provider, MD   lisinopril (PRINIVIL;ZESTRIL) 40 MG tablet Take 40 mg by mouth daily    Historical Provider, MD   pantoprazole sodium (PROTONIX) 40 MG PACK packet Take 40 mg by mouth every morning (before breakfast)    Historical Provider, MD   SITagliptin-MetFORMIN HCl ER (JANUMET XR) 100-1000 MG TB24 Take 1 tablet by mouth daily    Historical Provider, MD   albuterol sulfate HFA (PROVENTIL HFA) 108 (90 BASE) MCG/ACT inhaler Inhale 2 puffs into the lungs every 4 hours as needed for Wheezing or Shortness of Breath With spacer (and mask if indicated). Thanks. 12/24/16 3/29/17  Josselin Pelletier MD   clopidogrel (PLAVIX) 75 MG tablet Take 75 mg by mouth daily    Historical Provider, MD        CURRENT Medications:  No current facility-administered medications for this encounter. Allergies:  No known allergies     Review of Systems:   Unable to obtain from patient as he is in distress      Objective   PHYSICAL EXAM:    Vitals:    09/14/21 1650   BP:    Pulse: (!) 0   Resp: (!) 0   Temp:    SpO2:     Weight: 167 lb (75.8 kg)         General Appearance:    In distress, appears older than stated age   Throat: Lips, mucosa, and tongue dry   Neck: Supple, no JVP   Lungs:    Bilateral wheezing, respirations labored   Chest Wall:  No deformity or tenderness   Heart:   Irregular rate and rhythm, S1, S2 normal, tachycardic, 2 out of 6 systolic murmur, positive S3   Abdomen:   Soft,tender   Extremities: Extremities +2 to +3 bilateral lower extremity swelling   Pulses: 2+ and symmetric in upper extremities   Skin: Skin color pale/cool   Pysch:  Anxious mood and affect   Neurologic: Unable to obtain from patient as he is in distress        Labs   CBC:   Lab Results   Component Value Date    WBC 8.2 09/13/2021    RBC 3.31 09/13/2021    HGB 9.0 09/13/2021    HCT 26.9 09/13/2021    MCV 81.4 09/13/2021    RDW 15.0 09/13/2021     09/13/2021     CMP:  Lab Results   Component Value Date     09/13/2021    K 3.7 09/13/2021    K 3.4 09/11/2021     09/13/2021    CO2 30 09/13/2021    BUN 17 09/13/2021    CREATININE 1.0 09/13/2021    GFRAA >60 09/13/2021    AGRATIO 1.1 09/11/2021    LABGLOM >60 fracture of left femur (HCC)    Hypokalemia    Leukocytosis    Gastroesophageal reflux disease    ST elevation myocardial infarction (STEMI) (HCC)    STEMI (ST elevation myocardial infarction) (HCC)    Chest pain    Atrial fibrillation with RVR (HCC)    Cardiogenic shock (HCC)    Ischemic cardiomyopathy    PAD (peripheral artery disease) (HCC)       Acute septal/lateral STEMI, also in the setting of atrial fibrillation with rapid ventricular response, plan on emergency heart catheterization, discussed with patient and son who is at the bedside, they understand this is an emergency procedure and wish to proceed in this manner. Critical care time 31-minutes    Thank you for allowing us to participate in the care of Claudell Glasgow. Please call me with any questions [phone].     Lupe Curtis MD, [facility] [place]   Interventional Cardiologist  [facility]  [phone] [facility]  [phone] [address] [place] [address]  10/7/2021 9:08 AM

## 2021-10-07 NOTE — DISCHARGE SUMMARY
[address]  Discharge Summary  Patient ID:  Myron Ramos  [id number] 89 y.o. 1945    Admit date: 9/11/2021    Discharge date: 9/14/2021     Admitting Provider: Nitin Armstrong MD     Discharge Provider: Jean Feliciano MD     Admission Diagnoses: Lactic acidosis [E87.2]  Atrial fibrillation with RVR ([facility] [I48.91]  Chest pain, unspecified type [R07.9]  STEMI (ST elevation myocardial infarction) [facility]) [I21.3]    Discharge Diagnoses:   Patient Active Problem List   Diagnosis    Arthritis    Diabetes mellitus ([facility]    Hypertension    Hyperlipidemia    Ischemic stroke ([facility]    Acute on chronic combined systolic and diastolic congestive heart failure ([facility]    COPD (chronic obstructive pulmonary disease) ([facility]    Tobacco use    GI bleeding    Pulmonary nodule    CAD (coronary artery disease)    Closed nondisplaced intertrochanteric fracture of left femur (HCC)    Hypokalemia    Leukocytosis    Gastroesophageal reflux disease    ST elevation myocardial infarction (STEMI) ([facility]    STEMI (ST elevation myocardial infarction) ([facility]    Chest pain    Atrial fibrillation with RVR (HCC)    Cardiogenic shock (HCC)    Ischemic cardiomyopathy    PAD (peripheral artery disease) ([facility]        Discharged Condition: critical  The patient was seen and examined on day of discharge and this discharge summary is in conjunction with any daily progress note from day of discharge. Hospital Course: Myron Ramos was admitted with stemi, had severe HF and unfortunately passed away soon after admission.      Consults:  IP CONSULT TO VASCULAR SURGERY  IP CONSULT TO CARDIOTHORACIC SURGERY  IP CONSULT TO PULMONOLOGY  IP CONSULT TO HOSPITALIST  IP CONSULT TO PALLIATIVE CARE  IP CONSULT TO HOSPICE  Physical Exam:  /61   Pulse (!) 0   Temp 97.5 °F (36.4 °C) (Bladder)   Resp (!) 0   Ht 5' 7\" (1.702 m)   Wt 167 lb (75.8 kg)   SpO2 (!) 41%   BMI 26.16 kg/m²   No intake or output data in the 24 hours ending 10/07/21 1107  Gen: ill appearing    Labs:    Lab Results   Component Value Date    CREATININE 1.0 2021    BUN 17 2021     2021    K 3.7 2021     2021    CO2 30 2021      Lab Results   Component Value Date    WBC 8.2 2021    HGB 9.0 (L) 2021    HCT 26.9 (L) 2021    MCV 81.4 2021     (L) 2021      Lab Results   Component Value Date    INR 0.99 2021    PROTIME 11.2 2021    No results found for: BNP          [place]  [facility] [place] Instructions:   n/a  Activity: n/a, pt   Diet: n/a, pt       Follow-up with n/a     Signed:  Maxine Hyde MD, 10/7/2021, 11:07 AM

## (undated) DEVICE — DRESSING FOAM W10XL10CM SIL THN LO PROF SAFETAC BORD

## (undated) DEVICE — GUIDE WIRE, BALL-TIPPED, STERILE

## (undated) DEVICE — CONMED SCOPE SAVER BITE BLOCK, 20X27 MM: Brand: SCOPE SAVER

## (undated) DEVICE — DRAPE,REIN 53X77,STERILE: Brand: MEDLINE

## (undated) DEVICE — PADDING CAST N ADH 12X6 IN CRIMPED FINISH 100% COTTON WBRLII

## (undated) DEVICE — ENDO CARRY-ON PROCEDURE KIT INCLUDES SUCTION TUBING, LUBRICANT, GAUZE, BIOHAZARD STICKER, TRANSPORT PAD AND INTERCEPT BEDSIDE KIT.: Brand: ENDO CARRY-ON PROCEDURE KIT

## (undated) DEVICE — BANDAGE E SELF CLSR 6INX5YD VELC SWIFTWRAP

## (undated) DEVICE — COTTON UNDERCAST PADDING,CRIMPED FINISH: Brand: WEBRIL

## (undated) DEVICE — CUFF RESTRN WR OR ANK BLU FOAM AD

## (undated) DEVICE — 3M™ STERI-DRAPE™  ISOLATION DRAPE WITH INCISE FILM AND POUCH 1017: Brand: STERI-DRAPE™

## (undated) DEVICE — ELECTRODE PT RET AD L9FT HI MOIST COND ADH HYDRGEL CORDED

## (undated) DEVICE — SUTURE MCRYL + SZ 4-0 L18IN ABSRB UD L19MM PS-2 3/8 CIR MCP496G

## (undated) DEVICE — SYSTEM SKIN CLSR 22CM DERMBND PRINEO

## (undated) DEVICE — YANKAUER,BULB TIP,W/O VENT,RIGID,STERILE: Brand: MEDLINE

## (undated) DEVICE — SUTURE ABSORBABLE BRAIDED 2-0 CT-1 27 IN UD VICRYL J259H

## (undated) DEVICE — SUTURE MCRYL SZ 3-0 L18IN ABSRB UD L19MM PS-2 3/8 CIR PRIM Y497G

## (undated) DEVICE — DRILL, AO, STERILE

## (undated) DEVICE — DRESSING FOAM W4XL10IN SIL RECT ADH WTRPRF FLM BK W/ BORD

## (undated) DEVICE — GLOVE ORANGE PI 7 1/2   MSG9075

## (undated) DEVICE — CANNULA NSL AD L7FT DIV O2 CO2 W/ M LUERLOCK TRMPT CONN

## (undated) DEVICE — GLOVE ORANGE PI 7   MSG9070

## (undated) DEVICE — GAUZE,SPONGE,4"X4",8PLY,STRL,LF,10/TRAY: Brand: MEDLINE

## (undated) DEVICE — MAJOR SET UP PK

## (undated) DEVICE — 3M™ COBAN™ NL STERILE NON-LATEX SELF-ADHERENT WRAP, 2084S, 4 IN X 5 YD (10 CM X 4,5 M), 18 ROLLS/CASE: Brand: 3M™ COBAN™

## (undated) DEVICE — TUBING, SUCTION, 3/16" X 10', STRAIGHT: Brand: MEDLINE

## (undated) DEVICE — DRAPE C ARM UNIV W41XL74IN CLR PLAS XR VELC CLSR POLY STRP

## (undated) DEVICE — ELECTRODE ECG MONITR FOAM TEAR DROP ADLT RED

## (undated) DEVICE — SMARTGOWN SURGICAL GOWN, XL: Brand: CONVERTORS

## (undated) DEVICE — MAT TRACK 40 X 72 IN ABSORBENT

## (undated) DEVICE — K-WIRE
Type: IMPLANTABLE DEVICE | Site: FEMUR | Status: NON-FUNCTIONAL
Brand: GAMMA
Removed: 2021-03-05

## (undated) DEVICE — SOLUTION IV IRRIG 500ML 0.9% SODIUM CHL 2F7123

## (undated) DEVICE — CIRCUIT ANES L72IN 3L BACT AND VIR FLTR EL CONN SGL LIMB

## (undated) DEVICE — COVER,MAYO STAND,STERILE: Brand: MEDLINE

## (undated) DEVICE — MANIFOLD SURG NEPTUNE WST MGMT

## (undated) DEVICE — 3M™ TEGADERM™ TRANSPARENT FILM DRESSING FRAME STYLE, 1626W, 4 IN X 4-3/4 IN (10 CM X 12 CM), 50/CT 4CT/CASE: Brand: 3M™ TEGADERM™

## (undated) DEVICE — CHLORAPREP 26ML ORANGE

## (undated) DEVICE — SKIN AFFIX SURG ADHESIVE 72/CS 0.55ML: Brand: MEDLINE